# Patient Record
Sex: FEMALE | Race: BLACK OR AFRICAN AMERICAN | Employment: OTHER | URBAN - METROPOLITAN AREA
[De-identification: names, ages, dates, MRNs, and addresses within clinical notes are randomized per-mention and may not be internally consistent; named-entity substitution may affect disease eponyms.]

---

## 2018-05-23 PROBLEM — E66.01 OBESITY, MORBID (HCC): Status: ACTIVE | Noted: 2018-05-23

## 2018-05-29 ENCOUNTER — ANESTHESIA EVENT (OUTPATIENT)
Dept: SURGERY | Age: 68
End: 2018-05-29
Payer: MEDICARE

## 2018-05-29 RX ORDER — SODIUM CHLORIDE 0.9 % (FLUSH) 0.9 %
5-10 SYRINGE (ML) INJECTION EVERY 8 HOURS
Status: CANCELLED | OUTPATIENT
Start: 2018-05-29

## 2018-05-29 RX ORDER — OXYCODONE HYDROCHLORIDE 5 MG/1
5 TABLET ORAL ONCE
Status: CANCELLED | OUTPATIENT
Start: 2018-05-29 | End: 2018-05-30

## 2018-05-29 RX ORDER — SODIUM CHLORIDE 0.9 % (FLUSH) 0.9 %
5-10 SYRINGE (ML) INJECTION AS NEEDED
Status: CANCELLED | OUTPATIENT
Start: 2018-05-29

## 2018-05-30 ENCOUNTER — ANESTHESIA (OUTPATIENT)
Dept: SURGERY | Age: 68
End: 2018-05-30
Payer: MEDICARE

## 2018-05-30 ENCOUNTER — HOSPITAL ENCOUNTER (OUTPATIENT)
Age: 68
Setting detail: OUTPATIENT SURGERY
Discharge: HOME | End: 2018-05-30
Attending: SURGERY | Admitting: SURGERY
Payer: MEDICARE

## 2018-05-30 VITALS
HEART RATE: 60 BPM | DIASTOLIC BLOOD PRESSURE: 62 MMHG | TEMPERATURE: 97.8 F | SYSTOLIC BLOOD PRESSURE: 186 MMHG | RESPIRATION RATE: 12 BRPM | HEIGHT: 64 IN | WEIGHT: 240.38 LBS | OXYGEN SATURATION: 92 % | BODY MASS INDEX: 41.04 KG/M2

## 2018-05-30 DIAGNOSIS — I77.0 A-V FISTULA (HCC): Primary | ICD-10-CM

## 2018-05-30 LAB
GLUCOSE BLD STRIP.AUTO-MCNC: 67 MG/DL (ref 65–100)
GLUCOSE BLD STRIP.AUTO-MCNC: 84 MG/DL (ref 65–100)
POTASSIUM BLD-SCNC: 4.9 MMOL/L (ref 3.5–5.1)

## 2018-05-30 PROCEDURE — 84132 ASSAY OF SERUM POTASSIUM: CPT

## 2018-05-30 PROCEDURE — 76010000129 HC CV SURG 1.5 TO 2 HR: Performed by: SURGERY

## 2018-05-30 PROCEDURE — 74011250636 HC RX REV CODE- 250/636: Performed by: SURGERY

## 2018-05-30 PROCEDURE — 74011250636 HC RX REV CODE- 250/636

## 2018-05-30 PROCEDURE — 77030039266 HC ADH SKN EXOFIN S2SG -A: Performed by: SURGERY

## 2018-05-30 PROCEDURE — 93005 ELECTROCARDIOGRAM TRACING: CPT | Performed by: ANESTHESIOLOGY

## 2018-05-30 PROCEDURE — 77030016570 HC BLNKT BAIR HGGR 3M -B: Performed by: ANESTHESIOLOGY

## 2018-05-30 PROCEDURE — 74011250637 HC RX REV CODE- 250/637: Performed by: ANESTHESIOLOGY

## 2018-05-30 PROCEDURE — 74011000250 HC RX REV CODE- 250

## 2018-05-30 PROCEDURE — 74011250636 HC RX REV CODE- 250/636: Performed by: ANESTHESIOLOGY

## 2018-05-30 PROCEDURE — 77030002996 HC SUT SLK J&J -A: Performed by: SURGERY

## 2018-05-30 PROCEDURE — 76060000034 HC ANESTHESIA 1.5 TO 2 HR: Performed by: SURGERY

## 2018-05-30 PROCEDURE — 77030031139 HC SUT VCRL2 J&J -A: Performed by: SURGERY

## 2018-05-30 PROCEDURE — 77030002933 HC SUT MCRYL J&J -A: Performed by: SURGERY

## 2018-05-30 PROCEDURE — 77030034888 HC SUT PROL 2 J&J -B: Performed by: SURGERY

## 2018-05-30 PROCEDURE — 77030010512 HC APPL CLP LIG J&J -C: Performed by: SURGERY

## 2018-05-30 PROCEDURE — 74011000250 HC RX REV CODE- 250: Performed by: SURGERY

## 2018-05-30 PROCEDURE — 76210000016 HC OR PH I REC 1 TO 1.5 HR: Performed by: SURGERY

## 2018-05-30 PROCEDURE — 77030002986 HC SUT PROL J&J -A: Performed by: SURGERY

## 2018-05-30 PROCEDURE — 82962 GLUCOSE BLOOD TEST: CPT

## 2018-05-30 PROCEDURE — 77030032490 HC SLV COMPR SCD KNE COVD -B: Performed by: SURGERY

## 2018-05-30 PROCEDURE — 77030014008 HC SPNG HEMSTAT J&J -C: Performed by: SURGERY

## 2018-05-30 PROCEDURE — 77030020143 HC AIRWY LARYN INTUB CGAS -A: Performed by: ANESTHESIOLOGY

## 2018-05-30 PROCEDURE — 77030020782 HC GWN BAIR PAWS FLX 3M -B: Performed by: ANESTHESIOLOGY

## 2018-05-30 PROCEDURE — 77030011640 HC PAD GRND REM COVD -A: Performed by: SURGERY

## 2018-05-30 PROCEDURE — 76210000021 HC REC RM PH II 0.5 TO 1 HR: Performed by: SURGERY

## 2018-05-30 RX ORDER — HYDROCODONE BITARTRATE AND ACETAMINOPHEN 5; 325 MG/1; MG/1
1 TABLET ORAL
Qty: 12 TAB | Refills: 0 | Status: SHIPPED | OUTPATIENT
Start: 2018-05-30 | End: 2018-06-19 | Stop reason: ALTCHOICE

## 2018-05-30 RX ORDER — ONDANSETRON 2 MG/ML
4 INJECTION INTRAMUSCULAR; INTRAVENOUS
Status: COMPLETED | OUTPATIENT
Start: 2018-05-30 | End: 2018-05-30

## 2018-05-30 RX ORDER — ONDANSETRON 2 MG/ML
INJECTION INTRAMUSCULAR; INTRAVENOUS AS NEEDED
Status: DISCONTINUED | OUTPATIENT
Start: 2018-05-30 | End: 2018-05-30 | Stop reason: HOSPADM

## 2018-05-30 RX ORDER — HEPARIN SODIUM 1000 [USP'U]/ML
INJECTION, SOLUTION INTRAVENOUS; SUBCUTANEOUS AS NEEDED
Status: DISCONTINUED | OUTPATIENT
Start: 2018-05-30 | End: 2018-05-30 | Stop reason: HOSPADM

## 2018-05-30 RX ORDER — SODIUM CHLORIDE, SODIUM LACTATE, POTASSIUM CHLORIDE, CALCIUM CHLORIDE 600; 310; 30; 20 MG/100ML; MG/100ML; MG/100ML; MG/100ML
150 INJECTION, SOLUTION INTRAVENOUS CONTINUOUS
Status: DISCONTINUED | OUTPATIENT
Start: 2018-05-30 | End: 2018-05-30 | Stop reason: HOSPADM

## 2018-05-30 RX ORDER — BUPIVACAINE HYDROCHLORIDE 2.5 MG/ML
INJECTION, SOLUTION EPIDURAL; INFILTRATION; INTRACAUDAL AS NEEDED
Status: DISCONTINUED | OUTPATIENT
Start: 2018-05-30 | End: 2018-05-30 | Stop reason: HOSPADM

## 2018-05-30 RX ORDER — PROTAMINE SULFATE 10 MG/ML
INJECTION, SOLUTION INTRAVENOUS AS NEEDED
Status: DISCONTINUED | OUTPATIENT
Start: 2018-05-30 | End: 2018-05-30 | Stop reason: HOSPADM

## 2018-05-30 RX ORDER — FENTANYL CITRATE 50 UG/ML
100 INJECTION, SOLUTION INTRAMUSCULAR; INTRAVENOUS ONCE
Status: DISCONTINUED | OUTPATIENT
Start: 2018-05-30 | End: 2018-05-30 | Stop reason: HOSPADM

## 2018-05-30 RX ORDER — FENTANYL CITRATE 50 UG/ML
INJECTION, SOLUTION INTRAMUSCULAR; INTRAVENOUS AS NEEDED
Status: DISCONTINUED | OUTPATIENT
Start: 2018-05-30 | End: 2018-05-30 | Stop reason: HOSPADM

## 2018-05-30 RX ORDER — MIDAZOLAM HYDROCHLORIDE 1 MG/ML
2 INJECTION, SOLUTION INTRAMUSCULAR; INTRAVENOUS
Status: DISCONTINUED | OUTPATIENT
Start: 2018-05-30 | End: 2018-05-30 | Stop reason: HOSPADM

## 2018-05-30 RX ORDER — HYDROMORPHONE HYDROCHLORIDE 2 MG/ML
0.5 INJECTION, SOLUTION INTRAMUSCULAR; INTRAVENOUS; SUBCUTANEOUS
Status: DISCONTINUED | OUTPATIENT
Start: 2018-05-30 | End: 2018-05-30 | Stop reason: HOSPADM

## 2018-05-30 RX ORDER — SODIUM CHLORIDE 9 MG/ML
INJECTION, SOLUTION INTRAVENOUS
Status: DISCONTINUED | OUTPATIENT
Start: 2018-05-30 | End: 2018-05-30 | Stop reason: HOSPADM

## 2018-05-30 RX ORDER — HYDROCODONE BITARTRATE AND ACETAMINOPHEN 5; 325 MG/1; MG/1
1 TABLET ORAL AS NEEDED
Status: DISCONTINUED | OUTPATIENT
Start: 2018-05-30 | End: 2018-05-30 | Stop reason: HOSPADM

## 2018-05-30 RX ORDER — PROPOFOL 10 MG/ML
INJECTION, EMULSION INTRAVENOUS AS NEEDED
Status: DISCONTINUED | OUTPATIENT
Start: 2018-05-30 | End: 2018-05-30 | Stop reason: HOSPADM

## 2018-05-30 RX ORDER — EPHEDRINE SULFATE 50 MG/ML
INJECTION, SOLUTION INTRAVENOUS AS NEEDED
Status: DISCONTINUED | OUTPATIENT
Start: 2018-05-30 | End: 2018-05-30 | Stop reason: HOSPADM

## 2018-05-30 RX ORDER — HEPARIN SODIUM 5000 [USP'U]/ML
INJECTION, SOLUTION INTRAVENOUS; SUBCUTANEOUS AS NEEDED
Status: DISCONTINUED | OUTPATIENT
Start: 2018-05-30 | End: 2018-05-30 | Stop reason: HOSPADM

## 2018-05-30 RX ORDER — FAMOTIDINE 20 MG/1
20 TABLET, FILM COATED ORAL ONCE
Status: COMPLETED | OUTPATIENT
Start: 2018-05-30 | End: 2018-05-30

## 2018-05-30 RX ORDER — SODIUM CHLORIDE 9 MG/ML
50 INJECTION, SOLUTION INTRAVENOUS CONTINUOUS
Status: DISCONTINUED | OUTPATIENT
Start: 2018-05-30 | End: 2018-05-30 | Stop reason: HOSPADM

## 2018-05-30 RX ORDER — CEFAZOLIN SODIUM/WATER 2 G/20 ML
2 SYRINGE (ML) INTRAVENOUS
Status: COMPLETED | OUTPATIENT
Start: 2018-05-30 | End: 2018-05-30

## 2018-05-30 RX ORDER — LIDOCAINE HYDROCHLORIDE 20 MG/ML
INJECTION, SOLUTION EPIDURAL; INFILTRATION; INTRACAUDAL; PERINEURAL AS NEEDED
Status: DISCONTINUED | OUTPATIENT
Start: 2018-05-30 | End: 2018-05-30 | Stop reason: HOSPADM

## 2018-05-30 RX ORDER — LIDOCAINE HYDROCHLORIDE 10 MG/ML
INJECTION INFILTRATION; PERINEURAL AS NEEDED
Status: DISCONTINUED | OUTPATIENT
Start: 2018-05-30 | End: 2018-05-30 | Stop reason: HOSPADM

## 2018-05-30 RX ORDER — SODIUM CHLORIDE 0.9 % (FLUSH) 0.9 %
5-10 SYRINGE (ML) INJECTION AS NEEDED
Status: DISCONTINUED | OUTPATIENT
Start: 2018-05-30 | End: 2018-05-30 | Stop reason: HOSPADM

## 2018-05-30 RX ORDER — ACETAMINOPHEN 500 MG
1000 TABLET ORAL
Status: DISCONTINUED | OUTPATIENT
Start: 2018-05-30 | End: 2018-05-30 | Stop reason: HOSPADM

## 2018-05-30 RX ORDER — LIDOCAINE HYDROCHLORIDE 10 MG/ML
0.1 INJECTION INFILTRATION; PERINEURAL AS NEEDED
Status: DISCONTINUED | OUTPATIENT
Start: 2018-05-30 | End: 2018-05-30 | Stop reason: HOSPADM

## 2018-05-30 RX ORDER — GLYCOPYRROLATE 0.2 MG/ML
INJECTION INTRAMUSCULAR; INTRAVENOUS AS NEEDED
Status: DISCONTINUED | OUTPATIENT
Start: 2018-05-30 | End: 2018-05-30 | Stop reason: HOSPADM

## 2018-05-30 RX ADMIN — PROTAMINE SULFATE 10 MG: 10 INJECTION, SOLUTION INTRAVENOUS at 11:49

## 2018-05-30 RX ADMIN — FENTANYL CITRATE 25 MCG: 50 INJECTION, SOLUTION INTRAMUSCULAR; INTRAVENOUS at 11:41

## 2018-05-30 RX ADMIN — ONDANSETRON 4 MG: 2 INJECTION INTRAMUSCULAR; INTRAVENOUS at 11:54

## 2018-05-30 RX ADMIN — Medication 2 G: at 10:55

## 2018-05-30 RX ADMIN — FENTANYL CITRATE 25 MCG: 50 INJECTION, SOLUTION INTRAMUSCULAR; INTRAVENOUS at 10:57

## 2018-05-30 RX ADMIN — PROTAMINE SULFATE 5 MG: 10 INJECTION, SOLUTION INTRAVENOUS at 11:47

## 2018-05-30 RX ADMIN — HEPARIN SODIUM 5000 UNITS: 1000 INJECTION, SOLUTION INTRAVENOUS; SUBCUTANEOUS at 11:24

## 2018-05-30 RX ADMIN — FAMOTIDINE 20 MG: 20 TABLET, FILM COATED ORAL at 10:00

## 2018-05-30 RX ADMIN — PROTAMINE SULFATE 10 MG: 10 INJECTION, SOLUTION INTRAVENOUS at 11:48

## 2018-05-30 RX ADMIN — FENTANYL CITRATE 50 MCG: 50 INJECTION, SOLUTION INTRAMUSCULAR; INTRAVENOUS at 11:25

## 2018-05-30 RX ADMIN — SODIUM CHLORIDE: 9 INJECTION, SOLUTION INTRAVENOUS at 10:39

## 2018-05-30 RX ADMIN — FENTANYL CITRATE 25 MCG: 50 INJECTION, SOLUTION INTRAMUSCULAR; INTRAVENOUS at 10:50

## 2018-05-30 RX ADMIN — GLYCOPYRROLATE 0.2 MG: 0.2 INJECTION INTRAMUSCULAR; INTRAVENOUS at 11:01

## 2018-05-30 RX ADMIN — ONDANSETRON 4 MG: 2 INJECTION INTRAMUSCULAR; INTRAVENOUS at 13:45

## 2018-05-30 RX ADMIN — FENTANYL CITRATE 25 MCG: 50 INJECTION, SOLUTION INTRAMUSCULAR; INTRAVENOUS at 11:00

## 2018-05-30 RX ADMIN — FENTANYL CITRATE 25 MCG: 50 INJECTION, SOLUTION INTRAMUSCULAR; INTRAVENOUS at 11:07

## 2018-05-30 RX ADMIN — EPHEDRINE SULFATE 10 MG: 50 INJECTION, SOLUTION INTRAVENOUS at 11:30

## 2018-05-30 RX ADMIN — LIDOCAINE HYDROCHLORIDE 80 MG: 20 INJECTION, SOLUTION EPIDURAL; INFILTRATION; INTRACAUDAL; PERINEURAL at 10:46

## 2018-05-30 RX ADMIN — PROPOFOL 200 MG: 10 INJECTION, EMULSION INTRAVENOUS at 10:46

## 2018-05-30 NOTE — BRIEF OP NOTE
BRIEF OPERATIVE NOTE    Date of Procedure: 5/30/2018   Preoperative Diagnosis: End stage renal disease ([facility] 75.) [N18.6]  Postoperative Diagnosis: End stage renal disease ([facility] [N18.6]    Procedure(s):  LEFT BRACHIOCEPHALIC ARTERIO VENOUS FISTULA CREATION  Surgeon(s) and Role:     * Moreno Arellano MD - Primary       Surgical Staff:  Circ-1: Geraldo Galvin RN  Circ-Relief: Lesli Mcdaniel  Scrub Tech-1: Jonatan Villarreal  Scrub Tech-2: Sonny Hernandes Blevins  Scrub Tech-3: Malka Graft Tung  Event Time In   Incision Start 1106   Incision Close 1202     Anesthesia: General   Estimated Blood Loss: 25 mL  Specimens: * No specimens in log *   Findings: Good thrill and Doppler signals at completion  Complications: none  Implants: * No implants in log *

## 2018-05-30 NOTE — IP AVS SNAPSHOT
[address] [facility] 
 
 
 [address] [facility] [address] 
[phone] Patient: Raman Duff MRN: [id number] NVT:[id number]/0/4509 About your hospitalization You were admitted on:  May 30, 2018 You last received care in the:  [facility] PACU You were discharged on:  May 30, 2018 Why you were hospitalized Your primary diagnosis was:  Not on File Follow-up Information Follow up With Details Comments Contact Info Dayana Appiah MD   [id number] [address] 
[phone] Your Scheduled Appointments Tuesday June 19, 2018  8:45 AM EDT Global Post Op with Blanca Thao MD  
[facility] VASCULAR SURGERY ([facility]) [address] [address] [phone] Discharge Orders None A check sanya indicates which time of day the medication should be taken. My Medications START taking these medications Instructions Each Dose to Equal  
 Morning Noon Evening Bedtime HYDROcodone-acetaminophen 5-325 mg per tablet Commonly known as:  [address] Your last dose was: Your next dose is: Take 1 Tab by mouth every six (6) hours as needed for Pain. Max Daily Amount: 4 Tabs. Indications: Pain 1 Tab CONTINUE taking these medications Instructions Each Dose to Equal  
 Morning Noon Evening Bedtime  
 amLODIPine 10 mg tablet Commonly known as:  Rachael Matar Your last dose was: Your next dose is: Take  by mouth daily. aspirin delayed-release 81 mg tablet Your last dose was: Your next dose is: Take  by mouth daily. atorvastatin 80 mg tablet Commonly known as:  LIPITOR Your last dose was: Your next dose is: Take 80 mg by mouth nightly.   
 80 mg  
    
   
 Ferrous Fumarate 325 mg (106 mg iron) Tab Your last dose was: Your next dose is: Take  by mouth two (2) times a day. glipiZIDE 10 mg tablet Commonly known as:  Sunil Dixon Your last dose was: Your next dose is: Take 10 mg by mouth two (2) times a day. 10 mg  
    
   
   
   
  
 hydrALAZINE 25 mg tablet Commonly known as:  APRESOLINE Your last dose was: Your next dose is: Take 25 mg by mouth three (3) times daily. 25 mg  
    
   
   
   
  
 lisinopril 40 mg tablet Commonly known as:  Velora Nicholas Your last dose was: Your next dose is: Take 40 mg by mouth every morning. 40 mg  
    
   
   
   
  
 metoprolol succinate 50 mg XL tablet Commonly known as:  TOPROL-XL Your last dose was: Your next dose is: Take  by mouth daily. MUCUS RELIEF DM  mg Tab tablet Generic drug:  guaiFENesin-dextromethorphan Your last dose was: Your next dose is: Take  by mouth. SODIUM BICARBONATE PO Your last dose was: Your next dose is: Take  by mouth. torsemide 20 mg tablet Commonly known as:  DEMADEX Your last dose was: Your next dose is: Take  by mouth daily. VITAMIN D3 1,000 unit Cap Generic drug:  cholecalciferol Your last dose was: Your next dose is: Take  by mouth daily. Where to Get Your Medications Information on where to get these meds will be given to you by the nurse or doctor. ! Ask your nurse or doctor about these medications HYDROcodone-acetaminophen 5-325 mg per tablet Opioid Education Prescription Opioids: What You Need to Know: Prescription opioids can be used to help relieve moderate-to-severe pain and are often prescribed following a surgery or injury, or for certain health conditions. These medications can be an important part of treatment but also come with serious risks. Opioids are strong pain medicines. Examples include hydrocodone, oxycodone, fentanyl, and morphine. Heroin is an example of an illegal opioid. It is important to work with your health care provider to make sure you are getting the safest, most effective care. WHAT ARE THE RISKS AND SIDE EFFECTS OF OPIOID USE? Prescription opioids carry serious risks of addiction and overdose, especially with prolonged use. An opioid overdose, often marked by slow breathing, can cause sudden death. The use of prescription opioids can have a number of side effects as well, even when taken as directed. · Tolerance-meaning you might need to take more of a medication for the same pain relief · Physical dependence-meaning you have symptoms of withdrawal when the medication is stopped. Withdrawal symptoms can include nausea, sweating, chills, diarrhea, stomach cramps, and muscle aches. Withdrawal can last up to several weeks, depending on which drug you took and how long you took it. · Increased sensitivity to pain · Constipation · Nausea, vomiting, and dry mouth · Sleepiness and dizziness · Confusion · Depression · Low levels of testosterone that can result in lower sex drive, energy, and strength · Itching and sweating RISKS ARE GREATER WITH:      
· History of drug misuse, substance use disorder, or overdose · Mental health conditions (such as depression or anxiety) · Sleep apnea · Older age (72 years or older) · Pregnancy Avoid alcohol while taking prescription opioids. Also, unless specifically advised by your health care provider, medications to avoid include: · Benzodiazepines (such as Xanax or Valium) · Muscle relaxants (such as Soma or Flexeril) · Hypnotics (such as Ambien or Lunesta) · Other prescription opioids KNOW YOUR OPTIONS Talk to your health care provider about ways to manage your pain that don't involve prescription opioids. Some of these options may actually work better and have fewer risks and side effects. Options may include: 
· Pain relievers such as acetaminophen, ibuprofen, and naproxen · Some medications that are also used for depression or seizures · Physical therapy and exercise · Counseling to help patients learn how to cope better with triggers of pain and stress. · Application of heat or cold compress · Massage therapy · Relaxation techniques Be Informed Make sure you know the name of your medication, how much and how often to take it, and its potential risks & side effects. IF YOU ARE PRESCRIBED OPIOIDS FOR PAIN: 
· Never take opioids in greater amounts or more often than prescribed. Remember the goal is not to be pain-free but to manage your pain at a tolerable level. · Follow up with your primary care provider to: · Work together to create a plan on how to manage your pain. · Talk about ways to help manage your pain that don't involve prescription opioids. · Talk about any and all concerns and side effects. · Help prevent misuse and abuse. · Never sell or share prescription opioids · Help prevent misuse and abuse. · Store prescription opioids in a secure place and out of reach of others (this may include visitors, children, friends, and family). · Safely dispose of unused/unwanted prescription opioids: Find your community drug take-back program or your pharmacy mail-back program, or flush them down the toilet, following guidance from the Food and Drug Administration (www.fda.gov/Drugs/ResourcesForYou). · Visit www.cdc.gov/drugoverdose to learn about the risks of opioid abuse and overdose.  
· If you believe you may be struggling with addiction, tell your health care provider and ask for guidance or call Naz Torres at 6-016-700-ELDG. Discharge Instructions Hemodialysis Access: What to Expect at [facility] Your Recovery Hemodialysis is a way to remove wastes from the blood when your kidneys can no longer do the job. It is not a cure, but it can help you live longer and feel better. It is a lifesaving treatment when you have kidney failure. Hemodialysis is often called dialysis. Your doctor created a place (called an access) in your arm for your blood to flow in and out of your body during your dialysis sessions. Your arm will probably be bruised and swollen. It may hurt. The cut (incision) may bleed. The pain and bleeding will get better over several days. You will probably need only over-the-counter pain medicine. You can reduce swelling by propping your arm on 1 or 2 pillows and keeping your elbow straight. You will have stitches. These may dissolve on their own, or your doctor will tell you when to come in to have them removed. You should also be able to return to work in a few days. You may feel some coolness or numbness in your hand. These feelings usually go away in a few weeks. Your doctor may suggest squeezing a soft object. This will strengthen your access and may make hemodialysis faster and easier. You should always be able to feel blood rushing through the fistula or graft. It feels like a slight vibration when you put your fingers on the skin over the fistula or graft. This feeling is called a thrill or pulse. This care sheet gives you a general idea about how long it will take for you to recover. But each person recovers at a different pace. Follow the steps below to get better as quickly as possible. How can you care for yourself at home? Activity ? · Rest when you feel tired. Getting enough sleep will help you recover. Do not lie on or sleep on the arm with the access. ? · Avoid activities such as washing windows or gardening that put stress on the arm with the access. ? · You may use your arm, but do not lift anything that weighs more than about 15 pounds. This may include a child, heavy grocery bags, a heavy briefcase or backpack, cat litter or dog food bags, or a vacuum . ? · You can shower, but keep the access dry for the first 2 days. Cover the area with a plastic bag to keep it dry. ? · Do not soak or scrub the incision until it has healed. ? · Wear an arm guard to protect the area if you play sports or work with your arms. ? · You may drive when your doctor says it is okay. This is usually in 1 to 2 days. ? · Most people are able to return to work about 1 or 2 days after surgery. Diet ? · Follow an eating plan that is good for your kidneys. A registered dietitian can help you make a meal plan that is right for you. You may need to limit protein, salt, fluids, and certain foods. Medicines ? · Your doctor will tell you if and when you can restart your medicines. He or she will also give you instructions about taking any new medicines. ? · If you take blood thinners, such as warfarin (Coumadin), clopidogrel (Plavix), or aspirin, be sure to talk to your doctor. He or she will tell you if and when to start taking those medicines again. Make sure that you understand exactly what your doctor wants you to do. ? · Take pain medicines exactly as directed. ¨ If the doctor gave you a prescription medicine for pain, take it as prescribed. ¨ If you are not taking a prescription pain medicine, ask your doctor if you can take acetaminophen (Tylenol). Do not take ibuprofen (Advil, Motrin) or naproxen (Aleve), or similar medicines, unless your doctor tells you to. They may make chronic kidney disease worse.  
¨ Do not take two or more pain medicines at the same time unless the doctor told you to. Many pain medicines have acetaminophen, which is Tylenol. Too much acetaminophen (Tylenol) can be harmful. ? · If you think your pain medicine is making you sick to your stomach: 
¨ Take your medicine after meals (unless your doctor has told you not to). ¨ Ask your doctor for a different pain medicine. ? · If your doctor prescribed antibiotics, take them as directed. Do not stop taking them just because you feel better. You need to take the full course of antibiotics. Incision care ? · Keep the area dry for 2 days. After 2 days, wash the area with soap and water every day, and always before dialysis. ? · Do not soak or scrub the incision until it has healed. ? · If you have a bandage, change it every day or as your doctor recommends. Your doctor will tell you when you can remove it. Exercise ? · Squeeze a soft ball or other object as your doctor tells you. This will help blood flow through the access and help prevent blood clots. ? Elevation ? · Prop up the sore arm on a pillow anytime you sit or lie down during the next 3 days. Try to keep it above the level of your heart. This will help reduce swelling. Other instructions ? · Every day, check your access for a pulse or thrill in the fistula or graft area. A thrill is a vibration. To feel a pulse or thrill, place the first two fingers of your hand over the access. ? · Do not bump your arm. ? · Do not wear tight clothing, jewelry, or anything else that may squeeze the access. ? · Use your other arm to have blood drawn or blood pressure taken. ? · Do not put cream or lotion on or near the access. ? · Make sure all doctors you deal with know you have a vascular access. Follow-up care is a key part of your treatment and safety. Be sure to make and go to all appointments, and call your doctor if you are having problems. It's also a good idea to know your test results and keep a list of the medicines you take. When should you call for help? Call 911 anytime you think you may need emergency care. For example, call if: 
? · You passed out (lost consciousness). ? · You have chest pain, are short of breath, or cough up blood. ?Call your doctor now or seek immediate medical care if: 
? · Your hand or arm is cold or dark-colored. ? · You have no pulse in your access. ? · You have nausea or you vomit. ? · You have pain that does not get better after you take pain medicine. ? · You have loose stitches, or your incision comes open. ? · You are bleeding from the incision. ? · You have signs of infection, such as: 
¨ Increased pain, swelling, warmth, or redness. ¨ Red streaks leading from the area. ¨ Pus draining from the area. ¨ A fever. ? · You have signs of a blood clot in your leg (called a deep vein thrombosis), such as: 
¨ Pain in your calf, back of the knee, thigh, or groin. ¨ Redness or swelling in your leg. ? Watch closely for changes in your health, and be sure to contact your doctor if you have any problems. After general anesthesia or intravenous sedation, for 24 hours or while taking prescription Narcotics: · Limit your activities · Do not drive and operate hazardous machinery · Do not make important personal or business decisions · Do  not drink alcoholic beverages · If you have not urinated within 8 hours after discharge, please contact your surgeon on call. *  Please give a list of your current medications to your Primary Care Provider. *  Please update this list whenever your medications are discontinued, doses are 
    changed, or new medications (including over-the-counter products) are added. *  Please carry medication information at all times in case of emergency situations. These are general instructions for a healthy lifestyle: No smoking/ No tobacco products/ Avoid exposure to second hand smoke Surgeon General's Warning:  Quitting smoking now greatly reduces serious risk to your health. Obesity, smoking, and sedentary lifestyle greatly increases your risk for illness A healthy diet, regular physical exercise & weight monitoring are important for maintaining a healthy lifestyle You may be retaining fluid if you have a history of heart failure or if you experience any of the following symptoms:  Weight gain of 3 pounds or more overnight or 5 pounds in a week, increased swelling in our hands or feet or shortness of breath while lying flat in bed. Please call your doctor as soon as you notice any of these symptoms; do not wait until your next office visit. Recognize signs and symptoms of STROKE: 
F-face looks uneven A-arms unable to move or move unevenly S-speech slurred or non-existent T-time-call 911 as soon as signs and symptoms begin-DO NOT go Back to bed or wait to see if you get better-TIME IS BRAIN. Where can you learn more? Go to http://kari-mac.info/. Enter P616 in the search box to learn more about \"Hemodialysis Access: What to Expect at Home. \" Current as of: May 12, 2017 Content Version: 11.4 © 2941-5393 ffk environment. Care instructions adapted under license by [employer] (which disclaims liability or warranty for this information). If you have questions about a medical condition or this instruction, always ask your healthcare professional. [place] [address] any warranty or liability for your use of this information. Introducing hospitals & HEALTH SERVICES! [address] introduces RealMatch patient portal. Now you can access parts of your medical record, email your doctor's office, and request medication refills online. 1. In your internet browser, go to [email or web address] 2. Click on the First Time User? Click Here link in the Sign In box. You will see the New Member Sign Up page. 3. Enter your RealMatch Access Code exactly as it appears below.  You will not need to use this code after youve completed the sign-up process. If you do not sign up before the expiration date, you must request a new code. · Bracketz Access Code: [email or web address] Expires: 8/21/2018 11:48 AM 
 
4. Enter the last four digits of your Social Security Number (xxxx) and Date of Birth (mm/dd/yyyy) as indicated and click Submit. You will be taken to the next sign-up page. 5. Create a Bracketz ID. This will be your Bracketz login ID and cannot be changed, so think of one that is secure and easy to remember. 6. Create a Bracketz password. You can change your password at any time. 7. Enter your Password Reset Question and Answer. This can be used at a later time if you forget your password. 8. Enter your e-mail address. You will receive e-mail notification when new information is available in [address] 9. Click Sign Up. You can now view and download portions of your medical record. 10. Click the Download Summary menu link to download a portable copy of your medical information. If you have questions, please visit the Frequently Asked Questions section of the Bracketz website. Remember, Bracketz is NOT to be used for urgent needs. For medical emergencies, dial 911. Now available from your iPhone and Android! Introducing Teo Royal As a [facility] patient, I wanted to make you aware of our electronic visit tool called Teo Royal. [facility] 24/7 allows you to connect within minutes with a medical provider 24 hours a day, seven days a week via a mobile device or tablet or logging into a secure website from your computer. You can access Teo Royal from anywhere in the [place].  
 
A virtual visit might be right for you when you have a simple condition and feel like you just dont want to get out of bed, or cant get away from work for an appointment, when your regular [facility] provider is not available (evenings, weekends or holidays), or when youre out of town and need minor care. Electronic visits cost only $49 and if the [employer] provider determines a prescription is needed to treat your condition, one can be electronically transmitted to a nearby pharmacy*. Please take a moment to enroll today if you have not already done so. The enrollment process is free and takes just a few minutes. To enroll, please download the [employer] james to your tablet or phone, or visit [email or web address] to enroll on your computer. And, as an [address] [place] [address] patient with a [employer] account, the results of your visits will be scanned into your electronic medical record and your primary care provider will be able to view the scanned results. We urge you to continue to see your regular [facility] provider for your ongoing medical care. And while your primary care provider may not be the one available when you seek a Vaionipitof[employer] virtual visit, the peace of mind you get from getting a real diagnosis real time can be priceless. For more information on Vaionipitofin, view our Frequently Asked Questions (FAQs) at [email or web address]. Sincerely, 
 
Matty Varner MD 
Chief Medical Officer [address] *:  certain medications cannot be prescribed via Grid20/20 Unresulted Labs-Please follow up with your PCP about these lab tests Order Current Status EKG, 12 LEAD, INITIAL Preliminary result Providers Seen During Your Hospitalization Provider Specialty Primary office phone Kimberly Brown MD Vascular Surgery [phone] Your Primary Care Physician (PCP) Primary Care Physician Office Phone Office Fax Claudette Bowling [phone] You are allergic to the following No active allergies Recent Documentation Height Weight BMI OB Status Smoking Status 1.626 m 109 kg 41.26 kg/m2 Hysterectomy Never Smoker Emergency Contacts Name Discharge Info Relation Home Work Mobile Jt Flores  Brother [24] [phone] Patient Belongings The following personal items are in your possession at time of discharge: 
  Dental Appliances: Lowers, Uppers  Visual Aid: Glasses      Home Medications: None   Jewelry: None  Clothing: Footwear, Undergarments, Pants, Shirt    Other Valuables: Eyeglasses Please provide this summary of care documentation to your next provider. Signatures-by signing, you are acknowledging that this After Visit Summary has been reviewed with you and you have received a copy. Patient Signature:  ____________________________________________________________ Date:  ____________________________________________________________  
  
[facility] Provider Signature:  ____________________________________________________________ Date:  ____________________________________________________________

## 2018-05-30 NOTE — PERIOP NOTES
Dr. Leandro Perales informed of patients complaint of nausea, new orders rec. Per Dr. Leandro Perales patient may go home.

## 2018-05-30 NOTE — ANESTHESIA PREPROCEDURE EVALUATION
Anesthetic History   No history of anesthetic complications            Review of Systems / Medical History  Patient summary reviewed and pertinent labs reviewed    Pulmonary  Within defined limits                 Neuro/Psych   Within defined limits           Cardiovascular    Hypertension: well controlled          Hyperlipidemia    Exercise tolerance: <4 METS     GI/Hepatic/Renal         Renal disease (has not been dialy.  yet): ESRD       Endo/Other    Diabetes: well controlled, type 2, using insulin    Morbid obesity     Other Findings            Physical Exam    Airway  Mallampati: II  TM Distance: < 4 cm  Neck ROM: normal range of motion   Mouth opening: Normal     Cardiovascular    Rhythm: regular  Rate: normal    Murmur: Grade 2, Aortic area     Dental    Dentition: Full lower dentures, Full upper dentures and Edentulous     Pulmonary  Breath sounds clear to auscultation               Abdominal         Other Findings            Anesthetic Plan    ASA: 3  Anesthesia type: general          Induction: Intravenous  Anesthetic plan and risks discussed with: Patient

## 2018-05-30 NOTE — DISCHARGE INSTRUCTIONS
Hemodialysis Access: What to Expect at [facility]  Hemodialysis is a way to remove wastes from the blood when your kidneys can no longer do the job. It is not a cure, but it can help you live longer and feel better. It is a lifesaving treatment when you have kidney failure. Hemodialysis is often called dialysis. Your doctor created a place (called an access) in your arm for your blood to flow in and out of your body during your dialysis sessions. Your arm will probably be bruised and swollen. It may hurt. The cut (incision) may bleed. The pain and bleeding will get better over several days. You will probably need only over-the-counter pain medicine. You can reduce swelling by propping your arm on 1 or 2 pillows and keeping your elbow straight. You will have stitches. These may dissolve on their own, or your doctor will tell you when to come in to have them removed. You should also be able to return to work in a few days. You may feel some coolness or numbness in your hand. These feelings usually go away in a few weeks. Your doctor may suggest squeezing a soft object. This will strengthen your access and may make hemodialysis faster and easier. You should always be able to feel blood rushing through the fistula or graft. It feels like a slight vibration when you put your fingers on the skin over the fistula or graft. This feeling is called a thrill or pulse. This care sheet gives you a general idea about how long it will take for you to recover. But each person recovers at a different pace. Follow the steps below to get better as quickly as possible. How can you care for yourself at home? Activity  ? · Rest when you feel tired. Getting enough sleep will help you recover. Do not lie on or sleep on the arm with the access. ? · Avoid activities such as washing windows or gardening that put stress on the arm with the access.    ? · You may use your arm, but do not lift anything that weighs more than about 15 pounds. This may include a child, heavy grocery bags, a heavy briefcase or backpack, cat litter or dog food bags, or a vacuum . ? · You can shower, but keep the access dry for the first 2 days. Cover the area with a plastic bag to keep it dry. ? · Do not soak or scrub the incision until it has healed. ? · Wear an arm guard to protect the area if you play sports or work with your arms. ? · You may drive when your doctor says it is okay. This is usually in 1 to 2 days. ? · Most people are able to return to work about 1 or 2 days after surgery. Diet  ? · Follow an eating plan that is good for your kidneys. A registered dietitian can help you make a meal plan that is right for you. You may need to limit protein, salt, fluids, and certain foods. Medicines  ? · Your doctor will tell you if and when you can restart your medicines. He or she will also give you instructions about taking any new medicines. ? · If you take blood thinners, such as warfarin (Coumadin), clopidogrel (Plavix), or aspirin, be sure to talk to your doctor. He or she will tell you if and when to start taking those medicines again. Make sure that you understand exactly what your doctor wants you to do. ? · Take pain medicines exactly as directed. ¨ If the doctor gave you a prescription medicine for pain, take it as prescribed. ¨ If you are not taking a prescription pain medicine, ask your doctor if you can take acetaminophen (Tylenol). Do not take ibuprofen (Advil, Motrin) or naproxen (Aleve), or similar medicines, unless your doctor tells you to. They may make chronic kidney disease worse. ¨ Do not take two or more pain medicines at the same time unless the doctor told you to. Many pain medicines have acetaminophen, which is Tylenol. Too much acetaminophen (Tylenol) can be harmful.    ? · If you think your pain medicine is making you sick to your stomach:  ¨ Take your medicine after meals (unless your doctor has told you not to). ¨ Ask your doctor for a different pain medicine. ? · If your doctor prescribed antibiotics, take them as directed. Do not stop taking them just because you feel better. You need to take the full course of antibiotics. Incision care  ? · Keep the area dry for 2 days. After 2 days, wash the area with soap and water every day, and always before dialysis. ? · Do not soak or scrub the incision until it has healed. ? · If you have a bandage, change it every day or as your doctor recommends. Your doctor will tell you when you can remove it. Exercise  ? · Squeeze a soft ball or other object as your doctor tells you. This will help blood flow through the access and help prevent blood clots. ? Elevation  ? · Prop up the sore arm on a pillow anytime you sit or lie down during the next 3 days. Try to keep it above the level of your heart. This will help reduce swelling. Other instructions  ? · Every day, check your access for a pulse or thrill in the fistula or graft area. A thrill is a vibration. To feel a pulse or thrill, place the first two fingers of your hand over the access. ? · Do not bump your arm. ? · Do not wear tight clothing, jewelry, or anything else that may squeeze the access. ? · Use your other arm to have blood drawn or blood pressure taken. ? · Do not put cream or lotion on or near the access. ? · Make sure all doctors you deal with know you have a vascular access. Follow-up care is a key part of your treatment and safety. Be sure to make and go to all appointments, and call your doctor if you are having problems. It's also a good idea to know your test results and keep a list of the medicines you take. When should you call for help? Call 911 anytime you think you may need emergency care. For example, call if:  ? · You passed out (lost consciousness). ? · You have chest pain, are short of breath, or cough up blood.    ?Call your doctor now or seek immediate medical care if:  ? · Your hand or arm is cold or dark-colored. ? · You have no pulse in your access. ? · You have nausea or you vomit. ? · You have pain that does not get better after you take pain medicine. ? · You have loose stitches, or your incision comes open. ? · You are bleeding from the incision. ? · You have signs of infection, such as:  ¨ Increased pain, swelling, warmth, or redness. ¨ Red streaks leading from the area. ¨ Pus draining from the area. ¨ A fever. ? · You have signs of a blood clot in your leg (called a deep vein thrombosis), such as:  ¨ Pain in your calf, back of the knee, thigh, or groin. ¨ Redness or swelling in your leg. ? Watch closely for changes in your health, and be sure to contact your doctor if you have any problems. After general anesthesia or intravenous sedation, for 24 hours or while taking prescription Narcotics:  · Limit your activities  · Do not drive and operate hazardous machinery  · Do not make important personal or business decisions  · Do  not drink alcoholic beverages  · If you have not urinated within 8 hours after discharge, please contact your surgeon on call. *  Please give a list of your current medications to your Primary Care Provider. *  Please update this list whenever your medications are discontinued, doses are      changed, or new medications (including over-the-counter products) are added. *  Please carry medication information at all times in case of emergency situations. These are general instructions for a healthy lifestyle:  No smoking/ No tobacco products/ Avoid exposure to second hand smoke  Surgeon General's Warning:  Quitting smoking now greatly reduces serious risk to your health.   Obesity, smoking, and sedentary lifestyle greatly increases your risk for illness  A healthy diet, regular physical exercise & weight monitoring are important for maintaining a healthy lifestyle    You may be retaining fluid if you have a history of heart failure or if you experience any of the following symptoms:  Weight gain of 3 pounds or more overnight or 5 pounds in a week, increased swelling in our hands or feet or shortness of breath while lying flat in bed. Please call your doctor as soon as you notice any of these symptoms; do not wait until your next office visit. Recognize signs and symptoms of STROKE:  F-face looks uneven  A-arms unable to move or move unevenly  S-speech slurred or non-existent  T-time-call 911 as soon as signs and symptoms begin-DO NOT go       Back to bed or wait to see if you get better-TIME IS BRAIN. Where can you learn more? Go to http://kari-mac.info/. Enter P616 in the search box to learn more about \"Hemodialysis Access: What to Expect at Home. \"  Current as of: May 12, 2017  Content Version: 11.4  © 0253-6703 [employer]. Care instructions adapted under license by [employer] (which disclaims liability or warranty for this information). If you have questions about a medical condition or this instruction, always ask your healthcare professional. [place] [address] any warranty or liability for your use of this information.

## 2018-05-30 NOTE — OP NOTES
[facility]  MR#: [id number]  : 1950  ACCOUNT #: [de-identified]   DATE OF SERVICE: 2018    PREOPERATIVE DIAGNOSIS:  End-stage renal disease. POSTOPERATIVE DIAGNOSIS:   End-stage renal disease. PROCEDURE PERFORMED:  Left brachiocephalic arteriovenous fistula creation. SURGEON:  Dr. Olena Byrd. ASSISTANT:  None. ANESTHESIA:  General with local supplement. ESTIMATED BLOOD LOSS:  25 mL. SPECIMENS REMOVED:  None. COMPLICATIONS:  None. IMPLANTS:  None. STATEMENT OF MEDICAL NECESSITY:  The patient is a 76-year-old woman with end-stage renal disease. She has not yet started hemodialysis, but requires permanent access. Vein mapping indicates a suitable left upper arm cephalic vein for a fistula. PROCEDURE:  The patient was taken to the operating room and a general anesthetic was administered. The left upper extremity was prepped and draped in the usual sterile fashion. The skin was anesthetized in the antecubital fossa with 1% Xylocaine and 0.25% Sensorcaine and a standard transverse incision was made at the antecubital fossa. The junction of the confluence of the antecubital branches of the basilic and cephalic veins was carefully dissected. The basilic vein was divided between ligatures of 2-0 silk and the underlying aponeurosis was then divided to expose the brachial artery. The cephalic vein had a diameter of approximately 3.5 mm and no phlebitic changes. The brachial artery had a soft arterial wall with a normal pulse and a diameter of approximately 3.5 mm. The artery was surrounded with vessel loops proximally and distally. The cephalic vein was dissected distally to a branching point and then the anterior surface was marked with a marking pen to ensure proper orientation during the fistula creation.       The branches were divided distally and the distal stumps oversewn with 2-0 silk suture ligatures. The branching point bifurcation of the cephalic vein was then opened with a microscissors to allow for a larger spatulated anastomosis. The cephalic vein was gently injected with heparinized saline, which flowed easily and without resistance, and allowed the vein to dilate to approximately 4 mm. A small plastic vascular bulldog was then replaced on the cephalic vein. Heparin 5000 units was given intravenously and then 2 minutes later, the brachial artery was occluded proximally and distally with Fontaine vascular clamps. A longitudinal arteriotomy was made on the brachial artery oriented slightly laterally to facilitate the arteriovenous anastomosis. The lumen of the artery was inspected and found to be normal.      The cephalic vein was then anastomosed to the brachial artery in an end-to-side fashion using a single continuous 6-0 Prolene suture. The anastomosis was flushed and backbled, and then flow was restored to the fistula and then to the hand. At completion, there was a good thrill over the fistula as well as a strong Doppler signal over the fistula, including the cephalic vein runoff into the upper arm. The radial and ulnar artery pulses at the wrist were easily obtainable by Doppler. Protamine was then given to reverse the remaining heparin. The incisions were thoroughly irrigated and re-anesthetized. Fibrillar was used to assist with topical hemostasis. The incision was then closed in layers with running 3-0 Vicryl in the fascial layer. Care was taken not to compress or entrap the fistula. The skin was then closed with a running 4-0 subcuticular Monocryl. Tissue adhesive was then placed on the incision. The patient tolerated the procedure well and went to recovery in stable condition.       MD Adalid Lentz / Amy Garcia  D: 05/30/2018 12:25     T: 05/30/2018 12:50  JOB #: [id number]

## 2018-05-30 NOTE — ANESTHESIA POSTPROCEDURE EVALUATION
Post-Anesthesia Evaluation and Assessment    Patient: Karlos John MRN: [id number]  SSN: [id number]    YOB: 1950  Age: 69 y.o. Sex: female       Cardiovascular Function/Vital Signs  Visit Vitals    /62 (BP 1 Location: Right arm, BP Patient Position: Head of bed elevated (Comment degrees))    Pulse 60    Temp 36.6 °C (97.8 °F)    Resp 12    Ht 5' 4\" (1.626 m)    Wt 109 kg (240 lb 6 oz)    SpO2 92%    BMI 41.26 kg/m2       Patient is status post general anesthesia for Procedure(s):  LEFT BRACHIOCEPHALIC ARTERIO VENOUS FISTULA CREATION. Nausea/Vomiting: None    Postoperative hydration reviewed and adequate. Pain:  Pain Scale 1: Numeric (0 - 10) (05/30/18 1314)  Pain Intensity 1: 0 (05/30/18 1314)   Managed    Neurological Status:   Neuro (WDL): Within Defined Limits (05/30/18 1314)  Neuro  Neurologic State: Eyes open to voice;Drowsy (05/30/18 1224)  Orientation Level: Oriented X4 (05/30/18 1224)  Cognition: Follows commands (05/30/18 1224)  Speech: Clear (05/30/18 1224)  LUE Motor Response: Purposeful (05/30/18 1224)  LLE Motor Response: Purposeful (05/30/18 1224)  RUE Motor Response: Purposeful (05/30/18 1224)  RLE Motor Response: Purposeful (05/30/18 1224)   At baseline    Mental Status and Level of Consciousness: Arousable    Pulmonary Status:   O2 Device: Room air (05/30/18 1314)   Adequate oxygenation and airway patent    Complications related to anesthesia: None    Post-anesthesia assessment completed.  No concerns    Signed By: Augusto Prasad MD     May 30, 2018

## 2018-05-31 LAB
ATRIAL RATE: 62 BPM
CALCULATED P AXIS, ECG09: 39 DEGREES
CALCULATED R AXIS, ECG10: 21 DEGREES
CALCULATED T AXIS, ECG11: 17 DEGREES
DIAGNOSIS, 93000: NORMAL
P-R INTERVAL, ECG05: 174 MS
Q-T INTERVAL, ECG07: 468 MS
QRS DURATION, ECG06: 84 MS
QTC CALCULATION (BEZET), ECG08: 475 MS
VENTRICULAR RATE, ECG03: 62 BPM

## 2018-07-25 ENCOUNTER — HOSPITAL ENCOUNTER (OUTPATIENT)
Dept: SURGERY | Age: 68
Discharge: HOME | End: 2018-07-25
Payer: MEDICARE

## 2018-07-25 VITALS
WEIGHT: 247.06 LBS | BODY MASS INDEX: 42.18 KG/M2 | HEART RATE: 70 BPM | OXYGEN SATURATION: 95 % | RESPIRATION RATE: 16 BRPM | DIASTOLIC BLOOD PRESSURE: 69 MMHG | SYSTOLIC BLOOD PRESSURE: 186 MMHG | HEIGHT: 64 IN | TEMPERATURE: 98 F

## 2018-07-25 LAB
GLUCOSE BLD STRIP.AUTO-MCNC: 59 MG/DL (ref 65–100)
HGB BLD-MCNC: 9.3 G/DL (ref 11.7–15.4)
POTASSIUM SERPL-SCNC: 3.1 MMOL/L (ref 3.5–5.1)

## 2018-07-25 PROCEDURE — 82962 GLUCOSE BLOOD TEST: CPT

## 2018-07-25 PROCEDURE — 84132 ASSAY OF SERUM POTASSIUM: CPT | Performed by: ANESTHESIOLOGY

## 2018-07-25 PROCEDURE — 85018 HEMOGLOBIN: CPT | Performed by: ANESTHESIOLOGY

## 2018-07-25 RX ORDER — CHOLECALCIFEROL TAB 125 MCG (5000 UNIT) 125 MCG
5000 TAB ORAL DAILY
COMMUNITY

## 2018-07-25 RX ORDER — TORSEMIDE 100 MG/1
100 TABLET ORAL DAILY
COMMUNITY
End: 2022-03-24

## 2018-07-25 NOTE — PERIOP NOTES
Recent Results (from the past 12 hour(s))   GLUCOSE, POC    Collection Time: 07/25/18 11:42 AM   Result Value Ref Range    Glucose (POC) 59 (L) 65 - 100 mg/dL   POTASSIUM    Collection Time: 07/25/18 11:44 AM   Result Value Ref Range    Potassium 3.1 (L) 3.5 - 5.1 mmol/L   HEMOGLOBIN    Collection Time: 07/25/18 11:44 AM   Result Value Ref Range    HGB 9.3 (L) 11.7 - 15.4 g/dL

## 2018-07-25 NOTE — PERIOP NOTES
Patient verified name, , and surgery as listed in [facility]. Patient provided medical/health information and PTA medications to the best of their ability. TYPE  CASE:lb  Orders per surgeon: Received and dated . Labs per surgeon:Potassium. Labs per anesthesia protocol: POC Glucose, HGB,.   EKG  :  2018 NSR on chart. Echo in Care everywhere 2017, Stress test in Care Everywhere 2018. Patient has murmur. Called and notified Dr. Sabrina Herrera who came to see patient, recommended Echo prior to surgery. Called Dr. Len Tinsley office and spoke with Maryjane Hamilton to inform of needing echo. Patient has an appointment with Dr. Mirna Acosta on 2018 for office visit and will notify Dr. Mirna Acosta of recommendation for echo for cardiac clearance. POC Glucose: 59  Gave patient a can of soda. Patient provided with and instructed on education handouts including Guide to Surgery, blood transfusions, pain management, and hand hygiene for the family and community, and [facility] [place]. Antibacterial saop and Hibiclens instructions given per hospital policy. Instructed patient to continue previous medications as prescribed prior to surgery unless otherwise directed and to take the following medications the day of surgery according to anesthesia guidelines : Amlodipine, ASA 81 mg, Metoprolol . Instructed patient to hold  the following medications: Vitamins. Original medication prescription bottles were visualized during patient appointment. Patient teach back successful and patient demonstrates knowledge of instruction.

## 2018-07-27 NOTE — PERIOP NOTES
Echocardiogram Complete (Contrast or Bubble Study Per Protocol)6/19/2017  [facility]  Component Name Value Ref Range   IVS (2D) 1.51 0.6 - 0.9 cm   LVIDD (2D) 3.70 3.9 - 5.3 cm   LVIDS (2D) 2.32 2.2 - 3.5 cm   LVOT diameter 1.80 cm   LVOT Vmax 131.00 cm/s   LVOT peak gradient 7.00 mmHg    LVPWd (2D) 1.28 0.6 - 0.9 cm   LV E' lateral velocity 6.29 cm/s   LV E' septal velocity 4.99 cm/s   LV Ejection Fraction MOD 2C 70.00 %   LV Ejection Fraction MOD 4C 59.00 %   BP EF 67.00 %   Cube EF 75.30 %   Teichholz EF 68.20 %   MV E/e' Lateral Ratio 15.00     MV E/e' Septal Ratio 18.90     LVOT area 2.5 cm2   LA volume 69.00 cm3   LA dimension (2D) 4.00 cm   LA Volume Index 33.20 16 - 34 ml/m2   Ao Vmax 203.00 cm/s   AV peak gradient 16.00 mmHg    VERONICA (continuity Vmax) 1.64 cm2   Ascending aorta 3.10 cm   MV deceleration time 169.00 160 - 200 ms   MV Peak A David 106.00 cm/s   MV Peak E David 94.30 cm/s   E/A ratio 0.9 0.5 - 1.5    Pulmonary artery acceleration time 97.00 ms   PV Peak Velocity 133.00 cm/s   PV peak gradient 7.00 mmHg    RV R/L Diam 3.50 cm   IVC 1.50 cm   IVC 1.50 cm   /74 mmHg    BSA 2.18 m2   Weight 3712 oz    Height (In) 64 in    Pulmonary Artery Mean Presure 28.3 mmHg    Weight 232 lb    RV Free Wall Peak S' 13.4 cm/s   Result Narrative   · The left atrium is mildly dilated. · There is moderate concentric left ventricular hypertrophy present. · The left ventricular systolic function is normal (55-65%). · Grade II (moderate) left ventricular diastolic dysfunction present,   consistent with pseudonormalization. · No significant valvular abnormalities.      Copied and pasted from care everywhere 7/27/18

## 2018-07-27 NOTE — PERIOP NOTES
Dr. Andrae Isaac reviewed echo 6/19/17, ekg 3/30/17, last cardiologist office visit note 7/26/18. Per note, states CAD-abnormal stress test.  She will likely need dialysis in the very near future. Will pursue heart cath after her AV great revision is performed next week. Will plan on heart cath a couple of weeks after that to allow her wound time to heal.  Per Dr. Andrae Isaac, make note of cardiologist statement. Records on chart.

## 2018-07-29 ENCOUNTER — ANESTHESIA EVENT (OUTPATIENT)
Dept: SURGERY | Age: 68
End: 2018-07-29
Payer: MEDICARE

## 2018-07-30 ENCOUNTER — HOSPITAL ENCOUNTER (OUTPATIENT)
Age: 68
Setting detail: OUTPATIENT SURGERY
Discharge: HOME | End: 2018-07-30
Attending: SURGERY | Admitting: SURGERY
Payer: MEDICARE

## 2018-07-30 ENCOUNTER — ANESTHESIA (OUTPATIENT)
Dept: SURGERY | Age: 68
End: 2018-07-30
Payer: MEDICARE

## 2018-07-30 VITALS
SYSTOLIC BLOOD PRESSURE: 170 MMHG | WEIGHT: 251.8 LBS | TEMPERATURE: 98.1 F | RESPIRATION RATE: 18 BRPM | HEIGHT: 64 IN | HEART RATE: 59 BPM | OXYGEN SATURATION: 96 % | DIASTOLIC BLOOD PRESSURE: 79 MMHG | BODY MASS INDEX: 42.99 KG/M2

## 2018-07-30 DIAGNOSIS — T82.590D MECHANICAL COMPLICATION OF ARTERIOVENOUS FISTULA SURGICALLY CREATED, SUBSEQUENT ENCOUNTER: Primary | ICD-10-CM

## 2018-07-30 LAB
GLUCOSE BLD STRIP.AUTO-MCNC: 183 MG/DL (ref 65–100)
POTASSIUM BLD-SCNC: 3.3 MMOL/L (ref 3.5–5.1)

## 2018-07-30 PROCEDURE — 77030014008 HC SPNG HEMSTAT J&J -C: Performed by: SURGERY

## 2018-07-30 PROCEDURE — 77030039266 HC ADH SKN EXOFIN S2SG -A: Performed by: SURGERY

## 2018-07-30 PROCEDURE — 74011250636 HC RX REV CODE- 250/636: Performed by: ANESTHESIOLOGY

## 2018-07-30 PROCEDURE — 77030032490 HC SLV COMPR SCD KNE COVD -B: Performed by: SURGERY

## 2018-07-30 PROCEDURE — 84132 ASSAY OF SERUM POTASSIUM: CPT

## 2018-07-30 PROCEDURE — 77030002933 HC SUT MCRYL J&J -A: Performed by: SURGERY

## 2018-07-30 PROCEDURE — 76060000034 HC ANESTHESIA 1.5 TO 2 HR: Performed by: SURGERY

## 2018-07-30 PROCEDURE — 77030010512 HC APPL CLP LIG J&J -C: Performed by: SURGERY

## 2018-07-30 PROCEDURE — 76010000162 HC OR TIME 1.5 TO 2 HR INTENSV-TIER 1: Performed by: SURGERY

## 2018-07-30 PROCEDURE — 77030002987 HC SUT PROL J&J -B: Performed by: SURGERY

## 2018-07-30 PROCEDURE — 74011250637 HC RX REV CODE- 250/637: Performed by: ANESTHESIOLOGY

## 2018-07-30 PROCEDURE — 74011250636 HC RX REV CODE- 250/636

## 2018-07-30 PROCEDURE — 77030020782 HC GWN BAIR PAWS FLX 3M -B: Performed by: ANESTHESIOLOGY

## 2018-07-30 PROCEDURE — 74011000250 HC RX REV CODE- 250: Performed by: SURGERY

## 2018-07-30 PROCEDURE — 74011250636 HC RX REV CODE- 250/636: Performed by: SURGERY

## 2018-07-30 PROCEDURE — 82962 GLUCOSE BLOOD TEST: CPT

## 2018-07-30 PROCEDURE — 77030008477 HC STYL SATN SLP COVD -A: Performed by: ANESTHESIOLOGY

## 2018-07-30 PROCEDURE — 76210000024 HC REC RM PH II 2.5 TO 3 HR: Performed by: SURGERY

## 2018-07-30 PROCEDURE — 77030011640 HC PAD GRND REM COVD -A: Performed by: SURGERY

## 2018-07-30 PROCEDURE — 77030002996 HC SUT SLK J&J -A: Performed by: SURGERY

## 2018-07-30 PROCEDURE — 77030031139 HC SUT VCRL2 J&J -A: Performed by: SURGERY

## 2018-07-30 PROCEDURE — 74011000250 HC RX REV CODE- 250

## 2018-07-30 PROCEDURE — 77030019908 HC STETH ESOPH SIMS -A: Performed by: ANESTHESIOLOGY

## 2018-07-30 PROCEDURE — 76210000006 HC OR PH I REC 0.5 TO 1 HR: Performed by: SURGERY

## 2018-07-30 PROCEDURE — 77030008703 HC TU ET UNCUF COVD -A: Performed by: ANESTHESIOLOGY

## 2018-07-30 RX ORDER — MIDAZOLAM HYDROCHLORIDE 1 MG/ML
2 INJECTION, SOLUTION INTRAMUSCULAR; INTRAVENOUS
Status: DISCONTINUED | OUTPATIENT
Start: 2018-07-30 | End: 2018-07-30 | Stop reason: HOSPADM

## 2018-07-30 RX ORDER — LIDOCAINE HYDROCHLORIDE 20 MG/ML
INJECTION, SOLUTION EPIDURAL; INFILTRATION; INTRACAUDAL; PERINEURAL AS NEEDED
Status: DISCONTINUED | OUTPATIENT
Start: 2018-07-30 | End: 2018-07-30 | Stop reason: HOSPADM

## 2018-07-30 RX ORDER — ALBUTEROL SULFATE 0.83 MG/ML
2.5 SOLUTION RESPIRATORY (INHALATION) AS NEEDED
Status: DISCONTINUED | OUTPATIENT
Start: 2018-07-30 | End: 2018-07-30 | Stop reason: HOSPADM

## 2018-07-30 RX ORDER — PROPOFOL 10 MG/ML
INJECTION, EMULSION INTRAVENOUS AS NEEDED
Status: DISCONTINUED | OUTPATIENT
Start: 2018-07-30 | End: 2018-07-30 | Stop reason: HOSPADM

## 2018-07-30 RX ORDER — OXYCODONE AND ACETAMINOPHEN 7.5; 325 MG/1; MG/1
1 TABLET ORAL
Qty: 15 TAB | Refills: 0 | Status: SHIPPED | OUTPATIENT
Start: 2018-07-30 | End: 2021-11-02

## 2018-07-30 RX ORDER — SODIUM CHLORIDE 9 MG/ML
50 INJECTION, SOLUTION INTRAVENOUS CONTINUOUS
Status: DISCONTINUED | OUTPATIENT
Start: 2018-07-30 | End: 2018-07-30 | Stop reason: HOSPADM

## 2018-07-30 RX ORDER — HEPARIN SODIUM 5000 [USP'U]/ML
INJECTION, SOLUTION INTRAVENOUS; SUBCUTANEOUS AS NEEDED
Status: DISCONTINUED | OUTPATIENT
Start: 2018-07-30 | End: 2018-07-30 | Stop reason: HOSPADM

## 2018-07-30 RX ORDER — ONDANSETRON 2 MG/ML
INJECTION INTRAMUSCULAR; INTRAVENOUS AS NEEDED
Status: DISCONTINUED | OUTPATIENT
Start: 2018-07-30 | End: 2018-07-30 | Stop reason: HOSPADM

## 2018-07-30 RX ORDER — SODIUM CHLORIDE 0.9 % (FLUSH) 0.9 %
5-10 SYRINGE (ML) INJECTION EVERY 8 HOURS
Status: DISCONTINUED | OUTPATIENT
Start: 2018-07-30 | End: 2018-07-30 | Stop reason: HOSPADM

## 2018-07-30 RX ORDER — LIDOCAINE HYDROCHLORIDE 10 MG/ML
0.1 INJECTION INFILTRATION; PERINEURAL AS NEEDED
Status: DISCONTINUED | OUTPATIENT
Start: 2018-07-30 | End: 2018-07-30 | Stop reason: HOSPADM

## 2018-07-30 RX ORDER — SUCCINYLCHOLINE CHLORIDE 20 MG/ML
INJECTION INTRAMUSCULAR; INTRAVENOUS AS NEEDED
Status: DISCONTINUED | OUTPATIENT
Start: 2018-07-30 | End: 2018-07-30 | Stop reason: HOSPADM

## 2018-07-30 RX ORDER — EPHEDRINE SULFATE 50 MG/ML
INJECTION, SOLUTION INTRAVENOUS AS NEEDED
Status: DISCONTINUED | OUTPATIENT
Start: 2018-07-30 | End: 2018-07-30 | Stop reason: HOSPADM

## 2018-07-30 RX ORDER — SODIUM CHLORIDE, SODIUM LACTATE, POTASSIUM CHLORIDE, CALCIUM CHLORIDE 600; 310; 30; 20 MG/100ML; MG/100ML; MG/100ML; MG/100ML
1000 INJECTION, SOLUTION INTRAVENOUS CONTINUOUS
Status: DISCONTINUED | OUTPATIENT
Start: 2018-07-30 | End: 2018-07-30 | Stop reason: HOSPADM

## 2018-07-30 RX ORDER — FENTANYL CITRATE 50 UG/ML
INJECTION, SOLUTION INTRAMUSCULAR; INTRAVENOUS AS NEEDED
Status: DISCONTINUED | OUTPATIENT
Start: 2018-07-30 | End: 2018-07-30 | Stop reason: HOSPADM

## 2018-07-30 RX ORDER — CEFAZOLIN SODIUM/WATER 2 G/20 ML
2 SYRINGE (ML) INTRAVENOUS
Status: COMPLETED | OUTPATIENT
Start: 2018-07-30 | End: 2018-07-30

## 2018-07-30 RX ORDER — ONDANSETRON 2 MG/ML
4 INJECTION INTRAMUSCULAR; INTRAVENOUS
Status: DISCONTINUED | OUTPATIENT
Start: 2018-07-30 | End: 2018-07-30 | Stop reason: HOSPADM

## 2018-07-30 RX ORDER — POTASSIUM CHLORIDE 20 MEQ/1
40 TABLET, EXTENDED RELEASE ORAL
Status: COMPLETED | OUTPATIENT
Start: 2018-07-30 | End: 2018-07-30

## 2018-07-30 RX ORDER — DEXAMETHASONE SODIUM PHOSPHATE 4 MG/ML
INJECTION, SOLUTION INTRA-ARTICULAR; INTRALESIONAL; INTRAMUSCULAR; INTRAVENOUS; SOFT TISSUE AS NEEDED
Status: DISCONTINUED | OUTPATIENT
Start: 2018-07-30 | End: 2018-07-30 | Stop reason: HOSPADM

## 2018-07-30 RX ORDER — BUPIVACAINE HYDROCHLORIDE 2.5 MG/ML
INJECTION, SOLUTION EPIDURAL; INFILTRATION; INTRACAUDAL AS NEEDED
Status: DISCONTINUED | OUTPATIENT
Start: 2018-07-30 | End: 2018-07-30 | Stop reason: HOSPADM

## 2018-07-30 RX ORDER — SODIUM CHLORIDE 0.9 % (FLUSH) 0.9 %
5-10 SYRINGE (ML) INJECTION AS NEEDED
Status: DISCONTINUED | OUTPATIENT
Start: 2018-07-30 | End: 2018-07-30 | Stop reason: HOSPADM

## 2018-07-30 RX ORDER — ACETAMINOPHEN 500 MG
1000 TABLET ORAL ONCE
Status: COMPLETED | OUTPATIENT
Start: 2018-07-30 | End: 2018-07-30

## 2018-07-30 RX ORDER — FUROSEMIDE 10 MG/ML
40 INJECTION INTRAMUSCULAR; INTRAVENOUS ONCE
Status: COMPLETED | OUTPATIENT
Start: 2018-07-30 | End: 2018-07-30

## 2018-07-30 RX ORDER — LANOLIN ALCOHOL/MO/W.PET/CERES
400 CREAM (GRAM) TOPICAL ONCE
Status: COMPLETED | OUTPATIENT
Start: 2018-07-30 | End: 2018-07-30

## 2018-07-30 RX ORDER — LIDOCAINE HYDROCHLORIDE 10 MG/ML
INJECTION INFILTRATION; PERINEURAL AS NEEDED
Status: DISCONTINUED | OUTPATIENT
Start: 2018-07-30 | End: 2018-07-30 | Stop reason: HOSPADM

## 2018-07-30 RX ORDER — HYDROMORPHONE HYDROCHLORIDE 2 MG/ML
0.5 INJECTION, SOLUTION INTRAMUSCULAR; INTRAVENOUS; SUBCUTANEOUS
Status: DISCONTINUED | OUTPATIENT
Start: 2018-07-30 | End: 2018-07-30 | Stop reason: HOSPADM

## 2018-07-30 RX ADMIN — DEXAMETHASONE SODIUM PHOSPHATE 8 MG: 4 INJECTION, SOLUTION INTRA-ARTICULAR; INTRALESIONAL; INTRAMUSCULAR; INTRAVENOUS; SOFT TISSUE at 09:15

## 2018-07-30 RX ADMIN — ONDANSETRON 4 MG: 2 INJECTION INTRAMUSCULAR; INTRAVENOUS at 09:15

## 2018-07-30 RX ADMIN — PROPOFOL 20 MG: 10 INJECTION, EMULSION INTRAVENOUS at 10:05

## 2018-07-30 RX ADMIN — PROPOFOL 20 MG: 10 INJECTION, EMULSION INTRAVENOUS at 10:01

## 2018-07-30 RX ADMIN — PROPOFOL 10 MG: 10 INJECTION, EMULSION INTRAVENOUS at 10:07

## 2018-07-30 RX ADMIN — FUROSEMIDE 40 MG: 10 INJECTION, SOLUTION INTRAMUSCULAR; INTRAVENOUS at 11:51

## 2018-07-30 RX ADMIN — FENTANYL CITRATE 100 MCG: 50 INJECTION, SOLUTION INTRAMUSCULAR; INTRAVENOUS at 08:55

## 2018-07-30 RX ADMIN — POTASSIUM CHLORIDE 40 MEQ: 20 TABLET, EXTENDED RELEASE ORAL at 11:57

## 2018-07-30 RX ADMIN — SODIUM CHLORIDE 50 ML/HR: 900 INJECTION, SOLUTION INTRAVENOUS at 07:29

## 2018-07-30 RX ADMIN — SUCCINYLCHOLINE CHLORIDE 100 MG: 20 INJECTION INTRAMUSCULAR; INTRAVENOUS at 08:55

## 2018-07-30 RX ADMIN — SUCCINYLCHOLINE CHLORIDE 100 MG: 20 INJECTION INTRAMUSCULAR; INTRAVENOUS at 09:09

## 2018-07-30 RX ADMIN — PROPOFOL 120 MG: 10 INJECTION, EMULSION INTRAVENOUS at 08:55

## 2018-07-30 RX ADMIN — LIDOCAINE HYDROCHLORIDE 100 MG: 20 INJECTION, SOLUTION EPIDURAL; INFILTRATION; INTRACAUDAL; PERINEURAL at 08:55

## 2018-07-30 RX ADMIN — EPHEDRINE SULFATE 5 MG: 50 INJECTION, SOLUTION INTRAVENOUS at 09:23

## 2018-07-30 RX ADMIN — PROPOFOL 10 MG: 10 INJECTION, EMULSION INTRAVENOUS at 10:09

## 2018-07-30 RX ADMIN — ACETAMINOPHEN 1000 MG: 500 TABLET, FILM COATED ORAL at 07:32

## 2018-07-30 RX ADMIN — Medication 2 G: at 09:14

## 2018-07-30 RX ADMIN — Medication 400 MG: at 11:57

## 2018-07-30 RX ADMIN — PROPOFOL 20 MG: 10 INJECTION, EMULSION INTRAVENOUS at 09:57

## 2018-07-30 NOTE — PERIOP NOTES
Dr. Jimmy Acosta on unit - advised of patient's elevated blood pressure, POC glucose reading. No new orders.

## 2018-07-30 NOTE — IP AVS SNAPSHOT
[facility] 
 
 
 [address] [facility] [address] 
[phone] Patient: Kaur Ortega MRN: [id number] About your hospitalization You were admitted on:  July 30, 2018 You last received care in the:  [facility] PACU You were discharged on:  July 30, 2018 Why you were hospitalized Your primary diagnosis was:  Not on File Follow-up Information Follow up With Details Comments Contact Info Guillaume Ozuna MD   [id number]  [address] 
[phone] Franko Bruno MD Follow up on 8/21/2018 @11:00 AM  [address] [facility] [address] 
[phone] Your Scheduled Appointments Tuesday August 21, 2018 11:00 AM EDT Global Post Op with Franko Bruno MD  
[facility] VASCULAR SURGERY ([facility]) [address] [address] [phone] Discharge Orders None A check sanya indicates which time of day the medication should be taken. My Medications START taking these medications Instructions Each Dose to Equal  
 Morning Noon Evening Bedtime  
 oxyCODONE-acetaminophen 7.5-325 mg per tablet Commonly known as:  PERCOCET Your last dose was: Your next dose is: Take 1 Tab by mouth every six (6) hours as needed for Pain. Max Daily Amount: 4 Tabs. Indications: Pain 1 Tab CONTINUE taking these medications Instructions Each Dose to Equal  
 Morning Noon Evening Bedtime  
 amLODIPine 10 mg tablet Commonly known as:  Bhumika Spruce Your last dose was: Your next dose is: Take  by mouth daily. aspirin delayed-release 81 mg tablet Your last dose was: Your next dose is: Take  by mouth daily. atorvastatin 80 mg tablet Commonly known as:  LIPITOR Your last dose was: Your next dose is: Take 80 mg by mouth nightly. 80 mg  
    
   
   
   
  
 cholecalciferol (VITAMIN D3) 5,000 unit Tab tablet Commonly known as:  VITAMIN D3 Your last dose was: Your next dose is: Take 5,000 Units by mouth daily. 5000 Units Ferrous Fumarate 325 mg (106 mg iron) Tab Your last dose was: Your next dose is: Take  by mouth two (2) times a day. glipiZIDE 10 mg tablet Commonly known as:  Loura Yonas Your last dose was: Your next dose is: Take 10 mg by mouth two (2) times a day. 10 mg  
    
   
   
   
  
 hydrALAZINE 25 mg tablet Commonly known as:  APRESOLINE Your last dose was: Your next dose is: Take 25 mg by mouth three (3) times daily. 25 mg  
    
   
   
   
  
 lisinopril 40 mg tablet Commonly known as:  Marguerite Hagan Your last dose was: Your next dose is: Take 40 mg by mouth every morning. 40 mg  
    
   
   
   
  
 metoprolol succinate 50 mg XL tablet Commonly known as:  TOPROL-XL Your last dose was: Your next dose is: Take  by mouth daily. MUCUS RELIEF DM  mg Tab tablet Generic drug:  guaiFENesin-dextromethorphan Your last dose was: Your next dose is: Take 1 Tab by mouth as needed. 1 Tab SODIUM BICARBONATE PO Your last dose was: Your next dose is: Take  by mouth two (2) times a day. 10 GR tab  
     
   
   
   
  
 torsemide 100 mg tablet Commonly known as:  DEMADEX Your last dose was: Your next dose is: Take 100 mg by mouth daily. 100 mg Where to Get Your Medications Information on where to get these meds will be given to you by the nurse or doctor. ! Ask your nurse or doctor about these medications  
  oxyCODONE-acetaminophen 7.5-325 mg per tablet Opioid Education Prescription Opioids: What You Need to Know: 
 
Prescription opioids can be used to help relieve moderate-to-severe pain and are often prescribed following a surgery or injury, or for certain health conditions. These medications can be an important part of treatment but also come with serious risks. Opioids are strong pain medicines. Examples include hydrocodone, oxycodone, fentanyl, and morphine. Heroin is an example of an illegal opioid. It is important to work with your health care provider to make sure you are getting the safest, most effective care. WHAT ARE THE RISKS AND SIDE EFFECTS OF OPIOID USE? Prescription opioids carry serious risks of addiction and overdose, especially with prolonged use. An opioid overdose, often marked by slow breathing, can cause sudden death. The use of prescription opioids can have a number of side effects as well, even when taken as directed. · Tolerance-meaning you might need to take more of a medication for the same pain relief · Physical dependence-meaning you have symptoms of withdrawal when the medication is stopped. Withdrawal symptoms can include nausea, sweating, chills, diarrhea, stomach cramps, and muscle aches. Withdrawal can last up to several weeks, depending on which drug you took and how long you took it. · Increased sensitivity to pain · Constipation · Nausea, vomiting, and dry mouth · Sleepiness and dizziness · Confusion · Depression · Low levels of testosterone that can result in lower sex drive, energy, and strength · Itching and sweating RISKS ARE GREATER WITH:      
· History of drug misuse, substance use disorder, or overdose · Mental health conditions (such as depression or anxiety) · Sleep apnea · Older age (72 years or older) · Pregnancy Avoid alcohol while taking prescription opioids. Also, unless specifically advised by your health care provider, medications to avoid include: · Benzodiazepines (such as Xanax or Valium) · Muscle relaxants (such as Soma or Flexeril) · Hypnotics (such as Ambien or Lunesta) · Other prescription opioids KNOW YOUR OPTIONS Talk to your health care provider about ways to manage your pain that don't involve prescription opioids. Some of these options may actually work better and have fewer risks and side effects. Options may include: 
· Pain relievers such as acetaminophen, ibuprofen, and naproxen · Some medications that are also used for depression or seizures · Physical therapy and exercise · Counseling to help patients learn how to cope better with triggers of pain and stress. · Application of heat or cold compress · Massage therapy · Relaxation techniques Be Informed Make sure you know the name of your medication, how much and how often to take it, and its potential risks & side effects. IF YOU ARE PRESCRIBED OPIOIDS FOR PAIN: 
· Never take opioids in greater amounts or more often than prescribed. Remember the goal is not to be pain-free but to manage your pain at a tolerable level. · Follow up with your primary care provider to: · Work together to create a plan on how to manage your pain. · Talk about ways to help manage your pain that don't involve prescription opioids. · Talk about any and all concerns and side effects. · Help prevent misuse and abuse. · Never sell or share prescription opioids · Help prevent misuse and abuse. · Store prescription opioids in a secure place and out of reach of others (this may include visitors, children, friends, and family).  
· Safely dispose of unused/unwanted prescription opioids: Find your community drug take-back program or your pharmacy mail-back program, or flush them down the toilet, following guidance from the Food and Drug Administration (www.fda.gov/Drugs/ResourcesForYou). · Visit www.cdc.gov/drugoverdose to learn about the risks of opioid abuse and overdose. · If you believe you may be struggling with addiction, tell your health care provider and ask for guidance or call Naz Torres at [phone]. Discharge Instructions Hemodialysis Access: What to Expect at [facility] Your Recovery Hemodialysis is a way to remove wastes from the blood when your kidneys can no longer do the job. It is not a cure, but it can help you live longer and feel better. It is a lifesaving treatment when you have kidney failure. Hemodialysis is often called dialysis. Your doctor created a place (called an access) in your arm for your blood to flow in and out of your body during your dialysis sessions. Your arm will probably be bruised and swollen. It may hurt. The cut (incision) may bleed. The pain and bleeding will get better over several days. You will probably need only over-the-counter pain medicine. You can reduce swelling by propping your arm on 1 or 2 pillows and keeping your elbow straight. You will have stitches. These may dissolve on their own, or your doctor will tell you when to come in to have them removed. You should also be able to return to work in a few days. You may feel some coolness or numbness in your hand. These feelings usually go away in a few weeks. Your doctor may suggest squeezing a soft object. This will strengthen your access and may make hemodialysis faster and easier. You should always be able to feel blood rushing through the fistula or graft. It feels like a slight vibration when you put your fingers on the skin over the fistula or graft. This feeling is called a thrill or pulse.  
This care sheet gives you a general idea about how long it will take for you to recover. But each person recovers at a different pace. Follow the steps below to get better as quickly as possible. How can you care for yourself at home? Activity 
  · Rest when you feel tired. Getting enough sleep will help you recover. Do not lie on or sleep on the arm with the access.  
  · Avoid activities such as washing windows or gardening that put stress on the arm with the access.  
  · You may use your arm, but do not lift anything that weighs more than about 5 pounds with the left arm. This may include a child, heavy grocery bags, a heavy briefcase or backpack, cat litter or dog food bags, or a vacuum .  
  · You can shower, but keep the access dry for the first 2 days. Cover the area with a plastic bag to keep it dry.  
  · Do not soak or scrub the incision until it has healed.  
  · Wear an arm guard to protect the area if you play sports or work with your arms.  
  · You may drive when your doctor says it is okay. This is usually in 1 to 2 days.  
  · Most people are able to return to work about 1 or 2 days after surgery. Diet 
  · Follow an eating plan that is good for your kidneys. A registered dietitian can help you make a meal plan that is right for you. You may need to limit protein, salt, fluids, and certain foods. Medicines 
  · Your doctor will tell you if and when you can restart your medicines. He or she will also give you instructions about taking any new medicines.  
  · If you take blood thinners, such as warfarin (Coumadin), clopidogrel (Plavix), or aspirin, be sure to talk to your doctor. He or she will tell you if and when to start taking those medicines again. Make sure that you understand exactly what your doctor wants you to do.  
  · Take pain medicines exactly as directed. ¨ If the doctor gave you a prescription medicine for pain, take it as prescribed.  
¨ If you are not taking a prescription pain medicine, ask your doctor if you can take acetaminophen (Tylenol). Do not take ibuprofen (Advil, Motrin) or naproxen (Aleve), or similar medicines, unless your doctor tells you to. They may make chronic kidney disease worse. ¨ Do not take two or more pain medicines at the same time unless the doctor told you to. Many pain medicines have acetaminophen, which is Tylenol. Too much acetaminophen (Tylenol) can be harmful.  
  · If you think your pain medicine is making you sick to your stomach: 
¨ Take your medicine after meals (unless your doctor has told you not to). ¨ Ask your doctor for a different pain medicine.  
  · If your doctor prescribed antibiotics, take them as directed. Do not stop taking them just because you feel better. You need to take the full course of antibiotics. Incision care 
  · Keep the area dry until the outer gauze dressing is removed in 3-4 days. · You may remove the gauze dressing at home in 3-4 days. If you want help with it, call our office at [phone] and one of our staff will remove it for you. · After the dressing is removed, gently wash the area with soap and water every day, and always before dialysis.  
  · Do not soak or scrub the incision until it has healed.  
    
Exercise 
  · Squeeze a soft ball or other object as your doctor tells you. This will help blood flow through the access and help prevent blood clots.  
 Elevation 
  · Prop up the sore arm on a pillow anytime you sit or lie down during the next 3 days. Try to keep it above the level of your heart. This will help reduce swelling. Other instructions 
  · Every day, check your access for a pulse or thrill in the fistula or graft area. A thrill is a vibration. To feel a pulse or thrill, place the first two fingers of your hand over the access.  
  · Do not bump your arm.  
  · Do not wear tight clothing, jewelry, or anything else that may squeeze the access.  
  · Use your other arm to have blood drawn or blood pressure taken.   · Do not put cream or lotion on or near the access.  
  · Make sure all doctors you deal with know you have a vascular access. Follow-up care is a key part of your treatment and safety. Be sure to make and go to all appointments, and call your doctor if you are having problems. It's also a good idea to know your test results and keep a list of the medicines you take. When should you call for help? Call 911 anytime you think you may need emergency care. For example, call if: 
  · You passed out (lost consciousness).  
  · You have chest pain, are short of breath, or cough up blood.  
 Call your doctor now or seek immediate medical care if: 
  · Your hand or arm is cold or dark-colored.  
  · You have no pulse in your access.  
  · You have nausea or you vomit.  
  · You have pain that does not get better after you take pain medicine.  
  · You have loose stitches, or your incision comes open.  
  · You are bleeding from the incision.  
  · You have signs of infection, such as: 
¨ Increased pain, swelling, warmth, or redness. ¨ Red streaks leading from the area. ¨ Pus draining from the area. ¨ A fever.  
  · You have signs of a blood clot in your leg (called a deep vein thrombosis), such as: 
¨ Pain in your calf, back of the knee, thigh, or groin. ¨ Redness or swelling in your leg.  
 Watch closely for changes in your health, and be sure to contact your doctor if you have any problems. Where can you learn more? Go to http://kari-mac.info/. Enter P616 in the search box to learn more about \"Hemodialysis Access: What to Expect at Home. \" Current as of: May 12, 2017 Content Version: 11.7 © 0192-7522 [employer]. Care instructions adapted under license by [employer] (which disclaims liability or warranty for this information).  If you have questions about a medical condition or this instruction, always ask your healthcare professional. [employer] disclaims any warranty or liability for your use of this information. After general anesthesia or intravenous sedation, for 24 hours or while taking prescription Narcotics: · Limit your activities · Do not drive and operate hazardous machinery · Do not make important personal or business decisions · Do  not drink alcoholic beverages · If you have not urinated within 8 hours after discharge, please contact your surgeon on call. *  Please give a list of your current medications to your Primary Care Provider. *  Please update this list whenever your medications are discontinued, doses are 
    changed, or new medications (including over-the-counter products) are added. *  Please carry medication information at all times in case of emergency situations. These are general instructions for a healthy lifestyle: No smoking/ No tobacco products/ Avoid exposure to second hand smoke Surgeon General's Warning:  Quitting smoking now greatly reduces serious risk to your health. Obesity, smoking, and sedentary lifestyle greatly increases your risk for illness A healthy diet, regular physical exercise & weight monitoring are important for maintaining a healthy lifestyle You may be retaining fluid if you have a history of heart failure or if you experience any of the following symptoms:  Weight gain of 3 pounds or more overnight or 5 pounds in a week, increased swelling in our hands or feet or shortness of breath while lying flat in bed. Please call your doctor as soon as you notice any of these symptoms; do not wait until your next office visit. Recognize signs and symptoms of STROKE: 
F-face looks uneven A-arms unable to move or move unevenly S-speech slurred or non-existent T-time-call 911 as soon as signs and symptoms begin-DO NOT go Back to bed or wait to see if you get better-TIME IS BRAIN. Introducing [facility] & HEALTH SERVICES! [facility] introduces Fluxt patient portal. Now you can access parts of your medical record, email your doctor's office, and request medication refills online. 1. In your internet browser, go to [email or web address] 2. Click on the First Time User? Click Here link in the Sign In box. You will see the New Member Sign Up page. 3. Enter your [employer] Access Code exactly as it appears below. You will not need to use this code after youve completed the sign-up process. If you do not sign up before the expiration date, you must request a new code. · [employer] Access Code: [email or web address] Expires: 8/21/2018 11:48 AM 
 
4. Enter the last four digits of your Social Security Number (xxxx) and Date of Birth (mm/dd/yyyy) as indicated and click Submit. You will be taken to the next sign-up page. 5. Create a [employer] ID. This will be your [employer] login ID and cannot be changed, so think of one that is secure and easy to remember. 6. Create a [employer] password. You can change your password at any time. 7. Enter your Password Reset Question and Answer. This can be used at a later time if you forget your password. 8. Enter your e-mail address. You will receive e-mail notification when new information is available in [address] 9. Click Sign Up. You can now view and download portions of your medical record. 10. Click the Download Summary menu link to download a portable copy of your medical information. If you have questions, please visit the Frequently Asked Questions section of the [employer] website. Remember, [employer] is NOT to be used for urgent needs. For medical emergencies, dial 911. Now available from your iPhone and Android! Introducing Teo Royal As a [facility] patient, I wanted to make you aware of our electronic visit tool called Teo Royal. [facility] 24/7 allows you to connect within minutes with a medical provider 24 hours a day, seven days a week via a mobile device or tablet or logging into a secure website from your computer. You can access [employer] from anywhere in the [place]. A virtual visit might be right for you when you have a simple condition and feel like you just dont want to get out of bed, or cant get away from work for an appointment, when your regular [facility] provider is not available (evenings, weekends or holidays), or when youre out of town and need minor care. Electronic visits cost only $49 and if [facility] provider determines a prescription is needed to treat your condition, one can be electronically transmitted to a nearby pharmacy*. Please take a moment to enroll today if you have not already done so. The enrollment process is free and takes just a few minutes. To enroll, please download the Jesika Mario 24/A Green Night's Sleep james to your tablet or phone, or visit [email or web address] to enroll on your computer. And, as an [address] [place] [address] patient with a [employer] account, the results of your visits will be scanned into your electronic medical record and your primary care provider will be able to view the scanned results. We urge you to continue to see your regular [facility] provider for your ongoing medical care. And while your primary care provider may not be the one available when you seek a Teo Adamricardo virtual visit, the peace of mind you get from getting a real diagnosis real time can be priceless. For more information on Teo Imaxiopitofin, view our Frequently Asked Questions (FAQs) at [email or web address]. Sincerely, 
 
Lainey Hall MD 
Chief Medical Officer [address] *:  certain medications cannot be prescribed via Teo Adamricardo Providers Seen During Your Hospitalization Provider Specialty Primary office phone Nirmala Parsons MD Vascular Surgery [phone] Your Primary Care Physician (PCP) Primary Care Physician Office Phone Office Fax Charli Salas [phone] You are allergic to the following No active allergies Recent Documentation Height Weight BMI OB Status Smoking Status 1.626 m 114.2 kg 43.22 kg/m2 Hysterectomy Never Smoker Emergency Contacts Name Discharge Info Relation Home Work Mobile Jt Flores  Brother [24] [phone] [place] [facility] [3] Friend [5]   [phone] Patient Belongings The following personal items are in your possession at time of discharge: 
  Dental Appliances: Lowers, Uppers  Visual Aid: Glasses      Home Medications: None   Jewelry: None  Clothing: Dress, Footwear, Undergarments    Other Valuables: Eyeglasses Please provide this summary of care documentation to your next provider. Signatures-by signing, you are acknowledging that this After Visit Summary has been reviewed with you and you have received a copy. Patient Signature:  ____________________________________________________________ Date:  ____________________________________________________________  
  
Courtney Jones Provider Signature:  ____________________________________________________________ Date:  ____________________________________________________________

## 2018-07-30 NOTE — PERIOP NOTES
PHARMACY CALLED TO HAVE POTASSIUM, MAGNESIUM AND LASIX ORDERED BY DR Graham Fernandez SENT TO PACU AT [address] [facility]

## 2018-07-30 NOTE — OP NOTES
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 PROCEDURE PERFORMED:  Elevation revision of left brachiocephalic arteriovenous fistula. SURGEON:  John Ji MD    ASSISTANT:  None. ANESTHESIA:  General with local supplement. ESTIMATED BLOOD LOSS:  30 mL    SPECIMENS REMOVED:  None. IMPLANTS:  None. COMPLICATIONS:  None. STATEMENT OF MEDICAL NECESSITY:  The patient is a 78-year-old woman with a left brachiocephalic arteriovenous fistula. By ultrasound, the fistula is maturing adequately. However, due to the size of her arm, the fistula is too deep to permit safe cannulation and so elevation is required. PROCEDURE:  The patient was taken to the operating room and a general anesthetic was administered. Left upper extremity was prepped and draped in the usual sterile fashion. The skin was anesthetized over the initial portion of the fistula just at and above the antecubital fossa and a small longitudinal incision was made to expose the fistula there. There was a large lateral side branch near the anastomosis and this was ligated with 2-0 silk, but not divided. The remainder of the fistula was sequentially exposed by extending the incision gradually in increments of approximately 4 cm until it was at the shoulder. The fistula was found to be mildly tortuous, but otherwise without any structural issues. The fistula appears to be maturing and there are no sclerotic segments. The fistula was carefully dissected away from the adjacent soft tissue and side branches were divided between ligatures of 2-0 and/or 3-0 silk. Some reinforced with clips.     The fistula was completely mobilized and elevated and easily reached the subcutaneous plane  tension free. The incision was thoroughly irrigated and hemostasis was achieved. A small amount of fibrillar was placed at the base of the incision. The subcutaneous fascia was reapproximated deep to the fistula with interrupted 3-0 Vicryl sutures in order to render the fistula in a more superficial plane. The overlying skin was then closed with running 4-0 subcuticular Monocryl reinforced with simple 4-0 nylon skin sutures. A tissue adhesive and a sterile dressing were applied. The patient tolerated the procedure well and went to recovery in stable condition.       MD HUDSON Martinez / KAE  D: 07/30/2018 10:52     T: 07/30/2018 11:38  JOB #: [id number]

## 2018-07-30 NOTE — ANESTHESIA PREPROCEDURE EVALUATION
Anesthetic History PONV Review of Systems / Medical History Patient summary reviewed and pertinent labs reviewed Pulmonary Within defined limits Neuro/Psych Within defined limits Cardiovascular Hypertension: well controlled Hyperlipidemia Exercise tolerance: <4 METS Comments: 90% RA sats. GI/Hepatic/Renal 
  
 
 
Renal disease (has not been dialy. yet): ESRD Pertinent negatives: No GERD Endo/Other Diabetes: well controlled, type 2, using insulin Morbid obesity Other Findings Physical Exam 
 
Airway Mallampati: II 
TM Distance: < 4 cm Neck ROM: normal range of motion Mouth opening: Normal 
 
 Cardiovascular Rhythm: regular Rate: normal 
Peripheral edema (large pitting edema in legs into thighs) Murmur: Grade 2 and 1, Aortic area Dental 
 
Dentition: Full lower dentures, Full upper dentures and Edentulous Pulmonary Decreased breath sounds: bilateral 
 
Rales:bibasilar Abdominal 
 
 
 
 Other Findings Anesthetic Plan ASA: 3 Anesthesia type: general 
 
 
 
 
Induction: Intravenous Anesthetic plan and risks discussed with: Patient AMBAR

## 2018-07-30 NOTE — ANESTHESIA POSTPROCEDURE EVALUATION
Post-Anesthesia Evaluation and Assessment Patient: Jules Whitfield MRN: [id number]  SSN: [id number] YOB: 1950  Age: 69 y.o. Sex: female Cardiovascular Function/Vital Signs Visit Vitals  /79  Pulse (!) 59  Temp 36.7 °C (98.1 °F)  Resp 18  Ht 5' 4\" (1.626 m)  Wt 114.2 kg (251 lb 12.8 oz)  SpO2 96%  BMI 43.22 kg/m2 Patient is status post general anesthesia for Procedure(s): LEFT ARM  ARTERIO VENOUS FISTULA  ELEVATION. Nausea/Vomiting: None Postoperative hydration reviewed and adequate. Pain: 
Pain Scale 1: Numeric (0 - 10) (07/30/18 1116) Pain Intensity 1: 0 (07/30/18 1116) Managed Neurological Status:  
Neuro (WDL): Exceptions to WDL (07/30/18 1116) Neuro Neurologic State: Alert (07/30/18 1116) Orientation Level: Oriented X4 (07/30/18 1116) Cognition: Appropriate decision making; Appropriate for age attention/concentration; Appropriate safety awareness; Follows commands (07/30/18 1116) Speech: Clear (07/30/18 1116) LUE Motor Response: Purposeful (07/30/18 1116) LLE Motor Response: Purposeful (07/30/18 1116) RUE Motor Response: Purposeful (07/30/18 1116) RLE Motor Response: Purposeful (07/30/18 1116) At baseline Mental Status and Level of Consciousness: Arousable Pulmonary Status:  
O2 Device: Room air (07/30/18 1321) Adequate oxygenation and airway patent Complications related to anesthesia: None Post-anesthesia assessment completed. No concerns Signed By: Alton Linares MD   
 July 30, 2018

## 2018-07-30 NOTE — DISCHARGE INSTRUCTIONS
Hemodialysis Access: What to Expect at [facility]  Hemodialysis is a way to remove wastes from the blood when your kidneys can no longer do the job. It is not a cure, but it can help you live longer and feel better. It is a lifesaving treatment when you have kidney failure. Hemodialysis is often called dialysis. Your doctor created a place (called an access) in your arm for your blood to flow in and out of your body during your dialysis sessions. Your arm will probably be bruised and swollen. It may hurt. The cut (incision) may bleed. The pain and bleeding will get better over several days. You will probably need only over-the-counter pain medicine. You can reduce swelling by propping your arm on 1 or 2 pillows and keeping your elbow straight. You will have stitches. These may dissolve on their own, or your doctor will tell you when to come in to have them removed. You should also be able to return to work in a few days. You may feel some coolness or numbness in your hand. These feelings usually go away in a few weeks. Your doctor may suggest squeezing a soft object. This will strengthen your access and may make hemodialysis faster and easier. You should always be able to feel blood rushing through the fistula or graft. It feels like a slight vibration when you put your fingers on the skin over the fistula or graft. This feeling is called a thrill or pulse. This care sheet gives you a general idea about how long it will take for you to recover. But each person recovers at a different pace. Follow the steps below to get better as quickly as possible. How can you care for yourself at home? Activity    · Rest when you feel tired. Getting enough sleep will help you recover.  Do not lie on or sleep on the arm with the access.     · Avoid activities such as washing windows or gardening that put stress on the arm with the access.     · You may use your arm, but do not lift anything that weighs more than about 5 pounds with the left arm. This may include a child, heavy grocery bags, a heavy briefcase or backpack, cat litter or dog food bags, or a vacuum .     · You can shower, but keep the access dry for the first 2 days. Cover the area with a plastic bag to keep it dry.     · Do not soak or scrub the incision until it has healed.     · Wear an arm guard to protect the area if you play sports or work with your arms.     · You may drive when your doctor says it is okay. This is usually in 1 to 2 days.     · Most people are able to return to work about 1 or 2 days after surgery. Diet    · Follow an eating plan that is good for your kidneys. A registered dietitian can help you make a meal plan that is right for you. You may need to limit protein, salt, fluids, and certain foods. Medicines    · Your doctor will tell you if and when you can restart your medicines. He or she will also give you instructions about taking any new medicines.     · If you take blood thinners, such as warfarin (Coumadin), clopidogrel (Plavix), or aspirin, be sure to talk to your doctor. He or she will tell you if and when to start taking those medicines again. Make sure that you understand exactly what your doctor wants you to do.     · Take pain medicines exactly as directed. ¨ If the doctor gave you a prescription medicine for pain, take it as prescribed. ¨ If you are not taking a prescription pain medicine, ask your doctor if you can take acetaminophen (Tylenol). Do not take ibuprofen (Advil, Motrin) or naproxen (Aleve), or similar medicines, unless your doctor tells you to. They may make chronic kidney disease worse. ¨ Do not take two or more pain medicines at the same time unless the doctor told you to. Many pain medicines have acetaminophen, which is Tylenol.  Too much acetaminophen (Tylenol) can be harmful.     · If you think your pain medicine is making you sick to your stomach:  ¨ Take your medicine after meals (unless your doctor has told you not to). ¨ Ask your doctor for a different pain medicine.     · If your doctor prescribed antibiotics, take them as directed. Do not stop taking them just because you feel better. You need to take the full course of antibiotics. Incision care    · Keep the area dry until the outer gauze dressing is removed in 3-4 days. · You may remove the gauze dressing at home in 3-4 days. If you want help with it, call our office at [phone] and one of our staff will remove it for you. · After the dressing is removed, gently wash the area with soap and water every day, and always before dialysis.     · Do not soak or scrub the incision until it has healed.        Exercise    · Squeeze a soft ball or other object as your doctor tells you. This will help blood flow through the access and help prevent blood clots.    Elevation    · Prop up the sore arm on a pillow anytime you sit or lie down during the next 3 days. Try to keep it above the level of your heart. This will help reduce swelling. Other instructions    · Every day, check your access for a pulse or thrill in the fistula or graft area. A thrill is a vibration. To feel a pulse or thrill, place the first two fingers of your hand over the access.     · Do not bump your arm.     · Do not wear tight clothing, jewelry, or anything else that may squeeze the access.     · Use your other arm to have blood drawn or blood pressure taken.     · Do not put cream or lotion on or near the access.     · Make sure all doctors you deal with know you have a vascular access. Follow-up care is a key part of your treatment and safety. Be sure to make and go to all appointments, and call your doctor if you are having problems. It's also a good idea to know your test results and keep a list of the medicines you take. When should you call for help? Call 911 anytime you think you may need emergency care.  For example, call if:    · You passed out (lost consciousness).     · You have chest pain, are short of breath, or cough up blood.    Call your doctor now or seek immediate medical care if:    · Your hand or arm is cold or dark-colored.     · You have no pulse in your access.     · You have nausea or you vomit.     · You have pain that does not get better after you take pain medicine.     · You have loose stitches, or your incision comes open.     · You are bleeding from the incision.     · You have signs of infection, such as:  ¨ Increased pain, swelling, warmth, or redness. ¨ Red streaks leading from the area. ¨ Pus draining from the area. ¨ A fever.     · You have signs of a blood clot in your leg (called a deep vein thrombosis), such as:  ¨ Pain in your calf, back of the knee, thigh, or groin. ¨ Redness or swelling in your leg.    Watch closely for changes in your health, and be sure to contact your doctor if you have any problems. Where can you learn more? Go to http://kari-mac.info/. Enter P616 in the search box to learn more about \"Hemodialysis Access: What to Expect at Home. \"  Current as of: May 12, 2017  Content Version: 11.7  © 8893-0422 [employer]. Care instructions adapted under license by [employer] (which disclaims liability or warranty for this information). If you have questions about a medical condition or this instruction, always ask your healthcare professional. [place] [address] any warranty or liability for your use of this information. After general anesthesia or intravenous sedation, for 24 hours or while taking prescription Narcotics:  · Limit your activities  · Do not drive and operate hazardous machinery  · Do not make important personal or business decisions  · Do  not drink alcoholic beverages  · If you have not urinated within 8 hours after discharge, please contact your surgeon on call.     *  Please give a list of your current medications to your Primary Care Provider. *  Please update this list whenever your medications are discontinued, doses are      changed, or new medications (including over-the-counter products) are added. *  Please carry medication information at all times in case of emergency situations. These are general instructions for a healthy lifestyle:  No smoking/ No tobacco products/ Avoid exposure to second hand smoke  Surgeon General's Warning:  Quitting smoking now greatly reduces serious risk to your health. Obesity, smoking, and sedentary lifestyle greatly increases your risk for illness  A healthy diet, regular physical exercise & weight monitoring are important for maintaining a healthy lifestyle    You may be retaining fluid if you have a history of heart failure or if you experience any of the following symptoms:  Weight gain of 3 pounds or more overnight or 5 pounds in a week, increased swelling in our hands or feet or shortness of breath while lying flat in bed. Please call your doctor as soon as you notice any of these symptoms; do not wait until your next office visit. Recognize signs and symptoms of STROKE:  F-face looks uneven  A-arms unable to move or move unevenly  S-speech slurred or non-existent  T-time-call 911 as soon as signs and symptoms begin-DO NOT go       Back to bed or wait to see if you get better-TIME IS BRAIN.

## 2021-08-23 ENCOUNTER — ANESTHESIA EVENT (OUTPATIENT)
Dept: SURGERY | Age: 71
End: 2021-08-23
Payer: MEDICARE

## 2021-08-23 ENCOUNTER — APPOINTMENT (OUTPATIENT)
Dept: GENERAL RADIOLOGY | Age: 71
End: 2021-08-23
Attending: SURGERY
Payer: MEDICARE

## 2021-08-23 ENCOUNTER — APPOINTMENT (OUTPATIENT)
Dept: INTERVENTIONAL RADIOLOGY/VASCULAR | Age: 71
End: 2021-08-23
Attending: SURGERY
Payer: MEDICARE

## 2021-08-23 ENCOUNTER — ANESTHESIA (OUTPATIENT)
Dept: SURGERY | Age: 71
End: 2021-08-23
Payer: MEDICARE

## 2021-08-23 ENCOUNTER — HOSPITAL ENCOUNTER (OUTPATIENT)
Age: 71
Setting detail: OUTPATIENT SURGERY
Discharge: HOME | End: 2021-08-23
Attending: SURGERY | Admitting: SURGERY
Payer: MEDICARE

## 2021-08-23 VITALS
HEIGHT: 64 IN | DIASTOLIC BLOOD PRESSURE: 71 MMHG | OXYGEN SATURATION: 97 % | HEART RATE: 53 BPM | SYSTOLIC BLOOD PRESSURE: 164 MMHG | TEMPERATURE: 97.3 F | WEIGHT: 191.6 LBS | RESPIRATION RATE: 16 BRPM | BODY MASS INDEX: 32.71 KG/M2

## 2021-08-23 DIAGNOSIS — E66.01 OBESITY, MORBID (HCC): ICD-10-CM

## 2021-08-23 LAB
ANION GAP SERPL CALC-SCNC: 9 MMOL/L (ref 7–16)
ATRIAL RATE: 53 BPM
BUN SERPL-MCNC: 64 MG/DL (ref 8–23)
CALCIUM SERPL-MCNC: 8.3 MG/DL (ref 8.3–10.4)
CALCULATED P AXIS, ECG09: 64 DEGREES
CALCULATED R AXIS, ECG10: 1 DEGREES
CALCULATED T AXIS, ECG11: -153 DEGREES
CHLORIDE SERPL-SCNC: 102 MMOL/L (ref 98–107)
CO2 SERPL-SCNC: 27 MMOL/L (ref 21–32)
CREAT SERPL-MCNC: 10.4 MG/DL (ref 0.6–1)
DIAGNOSIS, 93000: NORMAL
GLUCOSE BLD STRIP.AUTO-MCNC: 112 MG/DL (ref 65–100)
GLUCOSE BLD STRIP.AUTO-MCNC: 45 MG/DL (ref 65–100)
GLUCOSE BLD STRIP.AUTO-MCNC: 47 MG/DL (ref 65–100)
GLUCOSE BLD STRIP.AUTO-MCNC: 66 MG/DL (ref 65–100)
GLUCOSE SERPL-MCNC: 162 MG/DL (ref 65–100)
HGB BLD-MCNC: 10.9 G/DL (ref 11.7–15.4)
P-R INTERVAL, ECG05: 220 MS
POTASSIUM BLD-SCNC: 6.9 MMOL/L (ref 3.5–5.1)
POTASSIUM SERPL-SCNC: 4.7 MMOL/L (ref 3.5–5.1)
Q-T INTERVAL, ECG07: 480 MS
QRS DURATION, ECG06: 86 MS
QTC CALCULATION (BEZET), ECG08: 450 MS
SERVICE CMNT-IMP: ABNORMAL
SERVICE CMNT-IMP: NORMAL
SODIUM SERPL-SCNC: 138 MMOL/L (ref 136–145)
VENTRICULAR RATE, ECG03: 53 BPM

## 2021-08-23 PROCEDURE — 77030037088 HC TUBE ENDOTRACH ORAL NSL COVD-A: Performed by: ANESTHESIOLOGY

## 2021-08-23 PROCEDURE — C1757 CATH, THROMBECTOMY/EMBOLECT: HCPCS | Performed by: SURGERY

## 2021-08-23 PROCEDURE — 77030018719 HC DRSG PTCH ANTIMIC J&J -A

## 2021-08-23 PROCEDURE — 77030002933 HC SUT MCRYL J&J -A: Performed by: SURGERY

## 2021-08-23 PROCEDURE — 77030002916 HC SUT ETHLN J&J -A

## 2021-08-23 PROCEDURE — 76010000161 HC OR TIME 1 TO 1.5 HR INTENSV-TIER 1: Performed by: SURGERY

## 2021-08-23 PROCEDURE — 2709999900 HC NON-CHARGEABLE SUPPLY: Performed by: SURGERY

## 2021-08-23 PROCEDURE — 77030031131 HC SUT MXN P COVD -B

## 2021-08-23 PROCEDURE — 76000 FLUOROSCOPY <1 HR PHYS/QHP: CPT

## 2021-08-23 PROCEDURE — 77030008584 HC TOOL GDWRE DEV TERU -A: Performed by: SURGERY

## 2021-08-23 PROCEDURE — 77030040361 HC SLV COMPR DVT MDII -B: Performed by: SURGERY

## 2021-08-23 PROCEDURE — 74011000250 HC RX REV CODE- 250

## 2021-08-23 PROCEDURE — 80048 BASIC METABOLIC PNL TOTAL CA: CPT

## 2021-08-23 PROCEDURE — 77030034888 HC SUT PROL 2 J&J -B: Performed by: SURGERY

## 2021-08-23 PROCEDURE — 77030031139 HC SUT VCRL2 J&J -A: Performed by: SURGERY

## 2021-08-23 PROCEDURE — C1894 INTRO/SHEATH, NON-LASER: HCPCS | Performed by: SURGERY

## 2021-08-23 PROCEDURE — 74011250636 HC RX REV CODE- 250/636

## 2021-08-23 PROCEDURE — 76210000063 HC OR PH I REC FIRST 0.5 HR: Performed by: SURGERY

## 2021-08-23 PROCEDURE — 74011000250 HC RX REV CODE- 250: Performed by: ANESTHESIOLOGY

## 2021-08-23 PROCEDURE — 93005 ELECTROCARDIOGRAM TRACING: CPT | Performed by: SURGERY

## 2021-08-23 PROCEDURE — 74011250636 HC RX REV CODE- 250/636: Performed by: SURGERY

## 2021-08-23 PROCEDURE — 77030021532 HC CATH ANGI DX IMPRS MRTM -B: Performed by: SURGERY

## 2021-08-23 PROCEDURE — 77030037400 HC ADH TISS HI VISC EXOFIN CHMP -B: Performed by: SURGERY

## 2021-08-23 PROCEDURE — 74011250636 HC RX REV CODE- 250/636: Performed by: ANESTHESIOLOGY

## 2021-08-23 PROCEDURE — C1750 CATH, HEMODIALYSIS,LONG-TERM: HCPCS

## 2021-08-23 PROCEDURE — 77030010507 HC ADH SKN DERMBND J&J -B

## 2021-08-23 PROCEDURE — C1769 GUIDE WIRE: HCPCS | Performed by: SURGERY

## 2021-08-23 PROCEDURE — 84132 ASSAY OF SERUM POTASSIUM: CPT

## 2021-08-23 PROCEDURE — 77001 FLUOROGUIDE FOR VEIN DEVICE: CPT

## 2021-08-23 PROCEDURE — 74011000250 HC RX REV CODE- 250: Performed by: PHYSICIAN ASSISTANT

## 2021-08-23 PROCEDURE — C1894 INTRO/SHEATH, NON-LASER: HCPCS

## 2021-08-23 PROCEDURE — 77030039425 HC BLD LARYNG TRULITE DISP TELE -A: Performed by: ANESTHESIOLOGY

## 2021-08-23 PROCEDURE — 85018 HEMOGLOBIN: CPT

## 2021-08-23 PROCEDURE — 74011000636 HC RX REV CODE- 636: Performed by: SURGERY

## 2021-08-23 PROCEDURE — 74011250636 HC RX REV CODE- 250/636: Performed by: PHYSICIAN ASSISTANT

## 2021-08-23 PROCEDURE — 36831 OPEN THROMBECT AV FISTULA: CPT | Performed by: SURGERY

## 2021-08-23 PROCEDURE — 82962 GLUCOSE BLOOD TEST: CPT

## 2021-08-23 PROCEDURE — 77030002987 HC SUT PROL J&J -B: Performed by: SURGERY

## 2021-08-23 PROCEDURE — 76060000033 HC ANESTHESIA 1 TO 1.5 HR: Performed by: SURGERY

## 2021-08-23 RX ORDER — LIDOCAINE HYDROCHLORIDE 20 MG/ML
INJECTION, SOLUTION EPIDURAL; INFILTRATION; INTRACAUDAL; PERINEURAL AS NEEDED
Status: DISCONTINUED | OUTPATIENT
Start: 2021-08-23 | End: 2021-08-23 | Stop reason: HOSPADM

## 2021-08-23 RX ORDER — MIDAZOLAM HYDROCHLORIDE 1 MG/ML
.5-2 INJECTION, SOLUTION INTRAMUSCULAR; INTRAVENOUS
Status: DISCONTINUED | OUTPATIENT
Start: 2021-08-23 | End: 2021-08-23

## 2021-08-23 RX ORDER — CEFAZOLIN SODIUM/WATER 2 G/20 ML
2 SYRINGE (ML) INTRAVENOUS ONCE
Status: COMPLETED | OUTPATIENT
Start: 2021-08-23 | End: 2021-08-23

## 2021-08-23 RX ORDER — ONDANSETRON 2 MG/ML
4 INJECTION INTRAMUSCULAR; INTRAVENOUS
Status: COMPLETED | OUTPATIENT
Start: 2021-08-23 | End: 2021-08-23

## 2021-08-23 RX ORDER — PROPOFOL 10 MG/ML
INJECTION, EMULSION INTRAVENOUS AS NEEDED
Status: DISCONTINUED | OUTPATIENT
Start: 2021-08-23 | End: 2021-08-23 | Stop reason: HOSPADM

## 2021-08-23 RX ORDER — FENTANYL CITRATE 50 UG/ML
INJECTION, SOLUTION INTRAMUSCULAR; INTRAVENOUS AS NEEDED
Status: DISCONTINUED | OUTPATIENT
Start: 2021-08-23 | End: 2021-08-23 | Stop reason: HOSPADM

## 2021-08-23 RX ORDER — LIDOCAINE HYDROCHLORIDE AND EPINEPHRINE 10; 10 MG/ML; UG/ML
1-20 INJECTION, SOLUTION INFILTRATION; PERINEURAL
Status: DISCONTINUED | OUTPATIENT
Start: 2021-08-23 | End: 2021-08-23

## 2021-08-23 RX ORDER — SODIUM CHLORIDE 9 MG/ML
25 INJECTION, SOLUTION INTRAVENOUS ONCE
Status: COMPLETED | OUTPATIENT
Start: 2021-08-23 | End: 2021-08-23

## 2021-08-23 RX ORDER — HYDRALAZINE HYDROCHLORIDE 20 MG/ML
INJECTION INTRAMUSCULAR; INTRAVENOUS AS NEEDED
Status: DISCONTINUED | OUTPATIENT
Start: 2021-08-23 | End: 2021-08-23 | Stop reason: HOSPADM

## 2021-08-23 RX ORDER — OXYCODONE HYDROCHLORIDE 5 MG/1
5 TABLET ORAL
Status: DISCONTINUED | OUTPATIENT
Start: 2021-08-23 | End: 2021-08-24 | Stop reason: HOSPADM

## 2021-08-23 RX ORDER — DEXTROSE 50 % IN WATER (D50W) INTRAVENOUS SYRINGE
25
Status: COMPLETED | OUTPATIENT
Start: 2021-08-23 | End: 2021-08-23

## 2021-08-23 RX ORDER — OXYCODONE HYDROCHLORIDE 5 MG/1
10 TABLET ORAL
Status: DISCONTINUED | OUTPATIENT
Start: 2021-08-23 | End: 2021-08-24 | Stop reason: HOSPADM

## 2021-08-23 RX ORDER — ROCURONIUM BROMIDE 10 MG/ML
INJECTION, SOLUTION INTRAVENOUS AS NEEDED
Status: DISCONTINUED | OUTPATIENT
Start: 2021-08-23 | End: 2021-08-23 | Stop reason: HOSPADM

## 2021-08-23 RX ORDER — GLYCOPYRROLATE 0.2 MG/ML
INJECTION INTRAMUSCULAR; INTRAVENOUS AS NEEDED
Status: DISCONTINUED | OUTPATIENT
Start: 2021-08-23 | End: 2021-08-23 | Stop reason: HOSPADM

## 2021-08-23 RX ORDER — HEPARIN SODIUM 1000 [USP'U]/ML
INJECTION, SOLUTION INTRAVENOUS; SUBCUTANEOUS AS NEEDED
Status: DISCONTINUED | OUTPATIENT
Start: 2021-08-23 | End: 2021-08-23 | Stop reason: HOSPADM

## 2021-08-23 RX ORDER — NEOSTIGMINE METHYLSULFATE 1 MG/ML
INJECTION, SOLUTION INTRAVENOUS AS NEEDED
Status: DISCONTINUED | OUTPATIENT
Start: 2021-08-23 | End: 2021-08-23 | Stop reason: HOSPADM

## 2021-08-23 RX ORDER — FENTANYL CITRATE 50 UG/ML
25-100 INJECTION, SOLUTION INTRAMUSCULAR; INTRAVENOUS
Status: DISCONTINUED | OUTPATIENT
Start: 2021-08-23 | End: 2021-08-23

## 2021-08-23 RX ORDER — HEPARIN SODIUM 5000 [USP'U]/ML
INJECTION, SOLUTION INTRAVENOUS; SUBCUTANEOUS AS NEEDED
Status: DISCONTINUED | OUTPATIENT
Start: 2021-08-23 | End: 2021-08-23 | Stop reason: HOSPADM

## 2021-08-23 RX ORDER — SODIUM CHLORIDE 9 MG/ML
25 INJECTION, SOLUTION INTRAVENOUS CONTINUOUS
Status: DISCONTINUED | OUTPATIENT
Start: 2021-08-23 | End: 2021-08-23 | Stop reason: HOSPADM

## 2021-08-23 RX ORDER — SODIUM CHLORIDE, SODIUM LACTATE, POTASSIUM CHLORIDE, CALCIUM CHLORIDE 600; 310; 30; 20 MG/100ML; MG/100ML; MG/100ML; MG/100ML
75 INJECTION, SOLUTION INTRAVENOUS CONTINUOUS
Status: DISCONTINUED | OUTPATIENT
Start: 2021-08-23 | End: 2021-08-24 | Stop reason: HOSPADM

## 2021-08-23 RX ORDER — EPHEDRINE SULFATE/0.9% NACL/PF 50 MG/5 ML
SYRINGE (ML) INTRAVENOUS AS NEEDED
Status: DISCONTINUED | OUTPATIENT
Start: 2021-08-23 | End: 2021-08-23 | Stop reason: HOSPADM

## 2021-08-23 RX ORDER — CALCIUM GLUCONATE 20 MG/ML
2 INJECTION, SOLUTION INTRAVENOUS ONCE
Status: DISCONTINUED | OUTPATIENT
Start: 2021-08-23 | End: 2021-08-23 | Stop reason: HOSPADM

## 2021-08-23 RX ORDER — SODIUM CHLORIDE 9 MG/ML
INJECTION, SOLUTION INTRAVENOUS
Status: DISCONTINUED | OUTPATIENT
Start: 2021-08-23 | End: 2021-08-23 | Stop reason: HOSPADM

## 2021-08-23 RX ORDER — HEPARIN SODIUM 1000 [USP'U]/ML
1-10000 INJECTION, SOLUTION INTRAVENOUS; SUBCUTANEOUS
Status: DISCONTINUED | OUTPATIENT
Start: 2021-08-23 | End: 2021-08-23

## 2021-08-23 RX ORDER — HYDROMORPHONE HYDROCHLORIDE 2 MG/ML
0.5 INJECTION, SOLUTION INTRAMUSCULAR; INTRAVENOUS; SUBCUTANEOUS
Status: DISCONTINUED | OUTPATIENT
Start: 2021-08-23 | End: 2021-08-23

## 2021-08-23 RX ADMIN — DEXTROSE MONOHYDRATE 12.5 G: 25 INJECTION, SOLUTION INTRAVENOUS at 11:25

## 2021-08-23 RX ADMIN — PROMETHAZINE HYDROCHLORIDE 3.25 MG: 25 INJECTION INTRAMUSCULAR; INTRAVENOUS at 13:28

## 2021-08-23 RX ADMIN — PROPOFOL 90 MG: 10 INJECTION, EMULSION INTRAVENOUS at 12:11

## 2021-08-23 RX ADMIN — LIDOCAINE HYDROCHLORIDE,EPINEPHRINE BITARTRATE 120 MG: 10; .01 INJECTION, SOLUTION INFILTRATION; PERINEURAL at 15:33

## 2021-08-23 RX ADMIN — FENTANYL CITRATE 25 MCG: 50 INJECTION INTRAMUSCULAR; INTRAVENOUS at 13:05

## 2021-08-23 RX ADMIN — HEPARIN SODIUM 2000 UNITS: 1000 INJECTION, SOLUTION INTRAVENOUS; SUBCUTANEOUS at 12:42

## 2021-08-23 RX ADMIN — SODIUM CHLORIDE: 900 INJECTION, SOLUTION INTRAVENOUS at 12:04

## 2021-08-23 RX ADMIN — GLYCOPYRROLATE 0.2 MG: 0.2 INJECTION, SOLUTION INTRAMUSCULAR; INTRAVENOUS at 12:22

## 2021-08-23 RX ADMIN — DEXTROSE 25 ML: 50 INJECTION, SOLUTION INTRAVENOUS at 12:29

## 2021-08-23 RX ADMIN — MIDAZOLAM 1 MG: 1 INJECTION INTRAMUSCULAR; INTRAVENOUS at 15:24

## 2021-08-23 RX ADMIN — Medication 3 MG: at 12:55

## 2021-08-23 RX ADMIN — FENTANYL CITRATE 25 MCG: 50 INJECTION, SOLUTION INTRAMUSCULAR; INTRAVENOUS at 15:35

## 2021-08-23 RX ADMIN — CEFAZOLIN 2 G: 1 INJECTION, POWDER, FOR SOLUTION INTRAVENOUS at 12:25

## 2021-08-23 RX ADMIN — LIDOCAINE HYDROCHLORIDE 60 MG: 20 INJECTION, SOLUTION EPIDURAL; INFILTRATION; INTRACAUDAL; PERINEURAL at 12:11

## 2021-08-23 RX ADMIN — PHENYLEPHRINE HYDROCHLORIDE 120 MCG: 10 INJECTION INTRAVENOUS at 12:48

## 2021-08-23 RX ADMIN — SODIUM CHLORIDE 25 ML/HR: 900 INJECTION, SOLUTION INTRAVENOUS at 15:20

## 2021-08-23 RX ADMIN — GLYCOPYRROLATE 0.4 MG: 0.2 INJECTION, SOLUTION INTRAMUSCULAR; INTRAVENOUS at 12:55

## 2021-08-23 RX ADMIN — FENTANYL CITRATE 25 MCG: 50 INJECTION INTRAMUSCULAR; INTRAVENOUS at 12:11

## 2021-08-23 RX ADMIN — HEPARIN SODIUM 5000 UNITS: 1000 INJECTION, SOLUTION INTRAVENOUS; SUBCUTANEOUS at 12:33

## 2021-08-23 RX ADMIN — DEXTROSE MONOHYDRATE 12.5 G: 25 INJECTION, SOLUTION INTRAVENOUS at 10:55

## 2021-08-23 RX ADMIN — Medication 15 MG: at 12:27

## 2021-08-23 RX ADMIN — ONDANSETRON 4 MG: 2 INJECTION INTRAMUSCULAR; INTRAVENOUS at 14:50

## 2021-08-23 RX ADMIN — FENTANYL CITRATE 50 MCG: 50 INJECTION, SOLUTION INTRAMUSCULAR; INTRAVENOUS at 15:24

## 2021-08-23 RX ADMIN — FENTANYL CITRATE 50 MCG: 50 INJECTION INTRAMUSCULAR; INTRAVENOUS at 13:03

## 2021-08-23 RX ADMIN — MIDAZOLAM 0.5 MG: 1 INJECTION INTRAMUSCULAR; INTRAVENOUS at 15:35

## 2021-08-23 RX ADMIN — Medication 15 MG: at 12:44

## 2021-08-23 RX ADMIN — HYDRALAZINE HYDROCHLORIDE 10 MG: 20 INJECTION INTRAMUSCULAR; INTRAVENOUS at 12:19

## 2021-08-23 RX ADMIN — ROCURONIUM BROMIDE 40 MG: 10 INJECTION, SOLUTION INTRAVENOUS at 12:12

## 2021-08-23 RX ADMIN — HEPARIN SODIUM 4200 UNITS: 1000 INJECTION INTRAVENOUS; SUBCUTANEOUS at 15:40

## 2021-08-23 RX ADMIN — SODIUM CHLORIDE 25 ML/HR: 900 INJECTION, SOLUTION INTRAVENOUS at 11:28

## 2021-08-23 NOTE — DISCHARGE INSTRUCTIONS
INSTRUCTIONS FOR A-V FISTULA, GRAFT ACCESS, REVISION OR DECLOT    ACTIVITY  · As tolerated and as directed by your doctor. · Keep arm straight and raised above heart level for the next 24 hours. DIET  · Clear liquids until no nausea or vomiting; then light diet for the first day. · Advance to regular diet on second day, unless your doctor orders otherwise. · If nausea and vomiting continues, call your doctor. PAIN  · Take pain medication as directed by your doctor. · Call your doctor if pain is NOT relieved by the medication. · DO NOT take aspirin or blood thinners until directed by your doctor. MEDICATION INTERACTION:  During your procedure you potentially received a medication or medications which may reduce the effectiveness of oral contraceptives. Please consider other forms of contraception for 1 month following your procedure if you are currently using oral contraceptives as your primary form of birth control. In addition to this, we recommend continuing your oral contraceptive as prescribed, unless otherwise instructed by your physician, during this time    DRESSING CARE  · Keep your dressing clean and dry  · Leave the dressing on until your doctor tells you to remove it, or your dialysis nurse takes it off if you have an upcoming dialysis appointment. · If you have an ace wrap on, please remove it on ________. CARE OF YOUR ACCESS  DO:  · Keep access area clean with soap and water daily after dressing has been removed. · Feel for the thrill daily. (by placing your fingers over the graft you will be able to feel a vibration (thrill) which means blood is flowing and the graft is working.)  DO NOT:  · Wear tight sleeves, watches, belts or bracelets over graft. · Carry heavy bags across the graft. · Sleep on graft side. · Let your blood pressure be taken on graft side. · Let blood be drawn from your graft side.     CALL YOUR DOCTOR IF  · Excessive bleeding that does not stop after holding mild pressure over area. · Temperature of 101 degrees F or above. · Redness, excessive swelling or bruising, and/or green or yellow, smelly discharge from incision   · Loss of sensation-cold, white, or blue fingers or toes. After general anesthesia or intravenous sedation, for 24 hours or while taking prescription Narcotics:  · Limit your activities  · A responsible adult needs to be with you for the next 24 hours  · Do not drive and operate hazardous machinery  · Do not make important personal or business decisions  · Do not drink alcoholic beverages  · If you have not urinated within 8 hours after discharge, and you are experiencing discomfort from urinary retention, please go to the nearest ED. · If you have sleep apnea and have a CPAP machine, please use it for all naps and sleeping. · Please use caution when taking narcotics and any of your home medications that may cause drowsiness. *  Please give a list of your current medications to your Primary Care Provider. *  Please update this list whenever your medications are discontinued, doses are      changed, or new medications (including over-the-counter products) are added. *  Please carry medication information at all times in case of emergency situations. These are general instructions for a healthy lifestyle:  No smoking/ No tobacco products/ Avoid exposure to second hand smoke  Surgeon General's Warning:  Quitting smoking now greatly reduces serious risk to your health. Obesity, smoking, and sedentary lifestyle greatly increases your risk for illness  A healthy diet, regular physical exercise & weight monitoring are important for maintaining a healthy lifestyle      [address] [facility]   [address]  Department of Interventional Radiology  [facility]  [phone] Office  [phone] Fax    Tunneled or Non Tunneled Catheter Discharge Instructions    General Information:   A catheter, either tunneled (permanent) or non-tunneled (temporary) catheter was inserted into your neck today for the purpose of Cancer treatment (apheresis) or dialysis. Your catheter will be used for the length of your apheresis, or until you get a fistula placed in surgery for dialysis. After this time, your catheter may be removed. You may return to our department for the catheter removal.  A non-tunneled catheter will exit at the neck. There will be three ports, two of which will be used for dialysis or apheresis. The one smaller port in the middle can be used like a regular IV. It ideally is used only for about two weeks. A tunneled catheter will exit lower down on the chest wall, and can be used for a longer period of time. There is no IV port on these. The tunneled catheter is used only for dialysis. In case of emergencies only can drugs be given through these catheters and only with written permission from your doctor. Home Care Instructions: You can resume your regular diet. Do not drink alcohol, drive, or make any important legal decisions in the next 24 hours. Watch the site carefully for signs of infection, like fever, drainage, redness, and/or swelling. Your catheter should be \"packed\" with heparin after each use or at least once a week if it is not being used. This \"packing\" should only be done by nurses experienced with caring for this type of catheter. You may shower in 24 hours, but you need to cover the catheter with plastic wrap and tape to keep it dry while you are showering. Keep the site clean, dry, and protected. Do not immerse yourself in water like in the case of tub baths or swimming as long as you have the catheter. Call If:   You should call your Physician and/or the Radiology Nurse if you have any signs of infection, shortness of breath, or if the dressing should come off or become moist.  You will be instructed on where to go for a new dressing.   You should not have to change the dressings yourself, as that will be done by the nurses who access the catheter. Follow-Up Instructions:  Please see your ordering doctor as he/she has requested. To Reach Us: If you have any questions about your procedure, please call the Interventional Radiology department at [phone]. After business hours (5pm) and weekends, call the answering service at [phone] and ask for the Radiologist on call to be paged. Si tiene Preguntas acerca del procedimiento, por favor llame al departamento de Radiología Intervencional al [phone]. Después de horas de oficina (5 pm) y los fines de [place], llamar al Ian Venegas al [phone] y pregunte por el Radiologo de [facility]. Interventional Radiology General Nurse Discharge    After general anesthesia or intravenous sedation, for 24 hours or while taking prescription Narcotics:  · Limit your activities  · Do not drive and operate hazardous machinery  · Do not make important personal or business decisions  · Do  not drink alcoholic beverages  · If you have not urinated within 8 hours after discharge, please contact your surgeon on call. * Please give a list of your current medications to your Primary Care Provider. * Please update this list whenever your medications are discontinued, doses are     changed, or new medications (including over-the-counter products) are added. * Please carry medication information at all times in case of emergency situations. These are general instructions for a healthy lifestyle:    No smoking/ No tobacco products/ Avoid exposure to second hand smoke  Surgeon General's Warning:  Quitting smoking now greatly reduces serious risk to your health.     Obesity, smoking, and sedentary lifestyle greatly increases your risk for illness  A healthy diet, regular physical exercise & weight monitoring are important for maintaining a healthy lifestyle    You may be retaining fluid if you have a history of heart failure or if you experience any of the following symptoms:  Weight gain of 3 pounds or more overnight or 5 pounds in a week, increased swelling in our hands or feet or shortness of breath while lying flat in bed. Please call your doctor as soon as you notice any of these symptoms; do not wait until your next office visit. Recognize signs and symptoms of STROKE:  F-face looks uneven    A-arms unable to move or move unevenly    S-speech slurred or non-existent    T-time-call 911 as soon as signs and symptoms begin-DO NOT go       Back to bed or wait to see if you get better-TIME IS BRAIN.

## 2021-08-23 NOTE — ANESTHESIA POSTPROCEDURE EVALUATION
Procedure(s):  PARTIAL LEFT THROMBECTOMY/EMBOLECTOMY UPPER EXTREMITY. general    Anesthesia Post Evaluation      Multimodal analgesia: multimodal analgesia not used between 6 hours prior to anesthesia start to PACU discharge  Patient location during evaluation: PACU  Patient participation: complete - patient participated  Level of consciousness: awake and alert  Pain management: adequate  Airway patency: patent  Anesthetic complications: no  Cardiovascular status: hemodynamically stable  Respiratory status: acceptable  Hydration status: acceptable        INITIAL Post-op Vital signs:   Vitals Value Taken Time   /64 08/23/21 1339   Temp 36.7 °C (98 °F) 08/23/21 1339   Pulse 53 08/23/21 1340   Resp 16 08/23/21 1339   SpO2 100 % 08/23/21 1340   Vitals shown include unvalidated device data.

## 2021-08-23 NOTE — PROGRESS NOTES
Patient status post attempted left upper arm declot. Patient left axillary stent, subclavian vein all occluded . Patient will need to have a tunnel catheter placed and will vein map her to look in her right arm. Patient be discharged when she leaves interventional radiology.     Veronica Khan

## 2021-08-23 NOTE — PROCEDURES
Department of Interventional Radiology  [phone]        Interventional Radiology Brief Procedure Note    Patient: Darvin Schulz MRN: [id number]  SSN: [id number]    YOB: 1950  Age: 65 y.o. Sex: female      Date of Procedure: 8/23/2021    Pre-Procedure Diagnosis: ESRD    Post-Procedure Diagnosis: SAME    Procedure(s): Tunneled Central Venous Catheter    Brief Description of Procedure: as above    Performed By: Mona Mckeon PA-C     Assistants: None    Anesthesia:Moderate Sedation per COLUMBA Iqbal MD    Estimated Blood Loss: Less than 10ml    Specimens:  None    Implants:  Tunnelled Hemodialysis Catheter    Findings: catheter tip in right atrium     Complications: None    Recommendations: ok to use catheter     Follow Up:  prn    Signed By: Mona Mckeon PA-C     August 23, 2021

## 2021-08-23 NOTE — PERIOP NOTES
TRANSFER - OUT REPORT:    Verbal report given to Merle Gudino RN on Lew Hedrick  being transferred to IR for ordered procedure       Report consisted of patients Situation, Background, Assessment and   Recommendations(SBAR). Information from the following report(s) SBAR, OR Summary, MAR and Cardiac Rhythm sb was reviewed with the receiving nurse. Lines:   Peripheral IV 08/23/21 Posterior;Right Hand (Active)   Site Assessment Clean, dry, & intact 08/23/21 1315   Phlebitis Assessment 0 08/23/21 1315   Infiltration Assessment 0 08/23/21 1315   Dressing Status Clean, dry, & intact 08/23/21 1315   Dressing Type Transparent;Tape 08/23/21 1315   Hub Color/Line Status Patent 08/23/21 1315   Alcohol Cap Used No 08/23/21 1315        Opportunity for questions and clarification was provided. Patient transported with:   O2 @ 3 liters  Registered Nurse    VTE prophylaxis orders have been written for eLw Hedrick. Patient and family given floor number and nurses name. Family updated re: pt status after security code verified.

## 2021-08-23 NOTE — OP NOTES
[facility]  OPERATIVE REPORT    Name:  Reynolds Jeans  MR#:  [id number]  :  1950  ACCOUNT #:  [de-identified]  DATE OF SERVICE:  2021    CLINICAL SERVICE:  Vascular Surgery    PREOPERATIVE DIAGNOSIS:  Occluded left brachiocephalic fistula. POSTOPERATIVE DIAGNOSIS:  Occluded left brachiocephalic fistula. PROCEDURE PERFORMED:  1. Open thromboembolectomy. 2.  Fistulogram.    SURGEON:  Mati Boykin. Jena Graves MD    ASSISTANT:      ANESTHESIA:  General.    COMPLICATIONS:  None. SPECIMENS REMOVED:  None. IMPLANTS:      ESTIMATED BLOOD LOSS:     PROCEDURE:  After getting informed consent, the patient was brought to the operating room. Anesthesia was then induced. Preop antibiotics were given before skin incision. The patient's left arm was then prepped and draped in a normal sterile fashion. Transverse incision was made at proximal end of the fistula. We dissected down through subcutaneous tissue where the fistula was incorporated. We gave the patient 5000 units of heparin. An 11-blade was then used to do transverse arteriotomy and confirmed that the graft was occluded. We manually pressured and removed a lot of thrombus from the outflow vein. We did not get any back bleed. Once all of that evacuated, we heparinized and flushed, however, we put a 8-[place] sheath and digital subtraction showed that mid portion of the cephalic vein was occluded. I gave additional 2000 units of heparin. We got a Glidewire. I was able to get a Kumpe catheter down into where the patient had a left axillary stent placement but we were not able to get a wire through. Digital subtraction showed that the stent in the distal subclavian vein and central veins were all occluded. This arm was nonsalvageable. I then removed the sheath and catheter and closed with 5-0 Prolene, 3-0 Vicryl, and 4-0 Monocryl. The patient was extubated and returned to the PACU in stable condition.       ARTURO De León, MD [email or web address]  D:  08/23/2021 13:11  T:  08/23/2021 16:00  JOB #:  [id number]

## 2021-08-23 NOTE — H&P
[address]   [facility] FAX: [address] [place]   1950    No chief complaint on file. HPI   Ms. Michael Cottrell is a 65y.o. year old female who presents with an occluded left brachiocephalic fistula. Current Outpatient Medications   Medication Sig Dispense Refill    oxyCODONE-acetaminophen (PERCOCET) 7.5-325 mg per tablet Take 1 Tab by mouth every six (6) hours as needed for Pain. Max Daily Amount: 4 Tabs. Indications: Pain 15 Tab 0    torsemide (DEMADEX) 100 mg tablet Take 100 mg by mouth daily.  cholecalciferol, VITAMIN D3, (VITAMIN D3) 5,000 unit tab tablet Take 5,000 Units by mouth daily.  metoprolol succinate (TOPROL-XL) 50 mg XL tablet Take  by mouth daily.  hydrALAZINE (APRESOLINE) 25 mg tablet Take 25 mg by mouth three (3) times daily.  lisinopril (PRINIVIL, ZESTRIL) 40 mg tablet Take 40 mg by mouth every morning.  aspirin delayed-release 81 mg tablet Take  by mouth daily.  Ferrous Fumarate 325 mg (106 mg iron) tab Take  by mouth two (2) times a day.  glipiZIDE (GLUCOTROL) 10 mg tablet Take 10 mg by mouth two (2) times a day.  amLODIPine (NORVASC) 10 mg tablet Take  by mouth daily.  SODIUM BICARBONATE PO Take  by mouth two (2) times a day. 10 GR tab      guaiFENesin-dextromethorphan (MUCUS RELIEF DM)  mg tab tablet Take 1 Tab by mouth as needed.  atorvastatin (LIPITOR) 80 mg tablet Take 80 mg by mouth nightly.        No Known Allergies  Past Medical History:   Diagnosis Date    Anemia     Arrhythmia     patient states she has a heart murmur, doesn't know kind    Chronic kidney disease     ESRD, patient doesn't dialize as of now 5/29/2018    Diabetes ([address]     typeobi, avgfbs 95, last A1C was 6.9 4/18/2018    Hypercholesterolemia     Hypertension     Morbid obesity (HCC)     Nausea & vomiting      Family History   Problem Relation Age of Onset    Stroke Mother    [facility] Hypertension Mother     Heart Disease Father         MI    Hypertension Father     Heart Disease Sister     Diabetes Brother     Heart Disease Brother     Heart Disease Brother      Past Surgical History:   Procedure Laterality Date    HX COLONOSCOPY      HX DILATION AND CURETTAGE      HX HYSTERECTOMY      VASCULAR SURGERY PROCEDURE UNLIST Left 05/30/2018    AVF creation     Social History     Tobacco Use    Smoking status: Never Smoker    Smokeless tobacco: Never Used   Substance Use Topics    Alcohol use: No        Review of Systems  Constitutional: Negative for fever and chills. HENT: Negative for congestion and sore throat. Skin: Negative for rash and itching. Eyes: Negative for blurred vision and double vision. Respiratory: Negative for cough and shortness of breath. Cardiovascular: Negative for chest pain, palpitations, claudication and leg swelling. Gastrointestinal: Negative for nausea, vomiting and abdominal pain. Genitourinary: Negative for dysuria, hematuria and flank pain. Musculoskeletal: Negative for joint pain and falls. Neurological: Negative for dizziness, sensory change, focal weakness, loss of consciousness and headaches. PHYSICAL EXAM   There were no vitals filed for this visit. Constitutional: she appears well-developed. No distress. HENT: Hearing intact. Head: Atraumatic. Eyes: Pupils are equal, round, and reactive to light. Neck: Normal range of motion. Cardiovascular: Regular rhythm. Pulmonary/Chest: Effort normal and breath sounds normal. No respiratory distress. Abdominal: Soft. Bowel sounds are normal. she exhibits no distension. There is no tenderness. There is no guarding. No hernia. Musculoskeletal: Normal range of motion. Neurological: She is alert. CN II- XII grossly intact  Skin: Warm and dry. Vascular: Occluded fistula      Imaging Results      ASSESSMENT AND PLAN   Ms. Bernie Guzman is a 65y.o. year old female end-stage renal disease with occluded left brachiocephalic fistula. We will schedule patient for open thromboembolectomy of fistulogram.    Elements of this note have been dictated using speech recognition software. As a result, errors of speech recognition may have occurred.

## 2021-08-23 NOTE — PROGRESS NOTES
Patient initial potassium 6.9. Recheck pending. Patient denies any chest pain no peak T waves on twelve-lead monitor. Order twelve-lead EKG and gave patient 2 g of calcium gluconate. I are discussed patient with Dr. Gail Ayala nephrologist, who if potassium still elevated over 6, will plan for temporary access placement today, dialysis after catheter and will plan for potential declot tomorrow.     Ian Artis

## 2021-08-23 NOTE — H&P
Department of Interventional Radiology  [phone]    History and Physical    Patient:  Darvin Schulz MRN:  [id number]  SSN:  [id number]    YOB: 1950  Age:  65 y.o. Sex:  female      Primary Care Provider:  Kiki Kahn MD  Referring Physician:  No ref. provider found    Subjective:     Chief Complaint: ESRD    History of the Present Illness: The patient is a 65 y.o. female with thrombosed AV access who presents for placement of tunneled HD catheter. NPO. Past Medical History:   Diagnosis Date    Anemia     Arrhythmia     patient states she has a heart murmur, doesn't know kind    Chronic kidney disease     ESRD, patient doesn't dialize as of now 5/29/2018    Diabetes ([address]     typell, avgfbs 95, last A1C was 6.9 4/18/2018    Hypercholesterolemia     Hypertension     Morbid obesity (HCC)     Nausea & vomiting      Past Surgical History:   Procedure Laterality Date    HX COLONOSCOPY      HX DILATION AND CURETTAGE      HX HYSTERECTOMY      VASCULAR SURGERY PROCEDURE UNLIST Left 05/30/2018    AVF creation        Review of Systems:    Pertinent items are noted in the History of Present Illness. Prior to Admission medications    Medication Sig Start Date End Date Taking? Authorizing Provider   torsemide (DEMADEX) 100 mg tablet Take 100 mg by mouth daily. Yes Provider, Historical   metoprolol succinate (TOPROL-XL) 50 mg XL tablet Take 12.5 mg by mouth daily. Yes Provider, Historical   aspirin delayed-release 81 mg tablet Take  by mouth daily. Yes Provider, Historical   SODIUM BICARBONATE PO Take  by mouth two (2) times a day. 10 GR tab   Yes Provider, Historical   oxyCODONE-acetaminophen (PERCOCET) 7.5-325 mg per tablet Take 1 Tab by mouth every six (6) hours as needed for Pain. Max Daily Amount: 4 Tabs. Indications: Pain 7/30/18   Annie Grissom MD   cholecalciferol, VITAMIN D3, (VITAMIN D3) 5,000 unit tab tablet Take 5,000 Units by mouth daily.   Patient not taking: Reported on 8/23/2021    Provider, Historical   hydrALAZINE (APRESOLINE) 25 mg tablet Take 25 mg by mouth three (3) times daily. Patient not taking: Reported on 8/23/2021    Provider, Historical   lisinopril (PRINIVIL, ZESTRIL) 40 mg tablet Take 40 mg by mouth every morning. Patient not taking: Reported on 8/23/2021    Provider, Historical   Ferrous Fumarate 325 mg (106 mg iron) tab Take  by mouth two (2) times a day. Provider, Historical   glipiZIDE (GLUCOTROL) 10 mg tablet Take 10 mg by mouth two (2) times a day. Patient not taking: Reported on 8/23/2021    Provider, Historical   amLODIPine (NORVASC) 10 mg tablet Take  by mouth daily. Patient not taking: Reported on 8/23/2021    Provider, Historical   guaiFENesin-dextromethorphan (MUCUS RELIEF DM)  mg tab tablet Take 1 Tab by mouth as needed. Patient not taking: Reported on 8/23/2021    Provider, Historical   atorvastatin (LIPITOR) 80 mg tablet Take 80 mg by mouth nightly.   Patient not taking: Reported on 8/23/2021    Provider, Historical        No Known Allergies    Family History   Problem Relation Age of Onset    Stroke Mother     Hypertension Mother     Heart Disease Father         MI    Hypertension Father     Heart Disease Sister     Diabetes Brother     Heart Disease Brother     Heart Disease Brother      Social History     Tobacco Use    Smoking status: Never Smoker    Smokeless tobacco: Never Used   Substance Use Topics    Alcohol use: No        Objective:       Physical Examination:    Vitals:    08/23/21 1329 08/23/21 1334 08/23/21 1339 08/23/21 1346   BP: (!) 159/67 139/64 138/64 (!) 152/70   Pulse: (!) 56 (!) 53 (!) 52 (!) 53   Resp: 18 16 16 16   Temp:   98 °F (36.7 °C) 97.3 °F (36.3 °C)   SpO2: 100% 100% 100% 100%   Weight:       Height:           Pain Assessment  Pain Intensity 1: 0 (08/23/21 1346)        Patient Stated Pain Goal: 0      HEART: regular rate and rhythm  LUNG: clear to auscultation bilaterally  ABDOMEN: normal findings: soft, non-tender  EXTREMITIES: warm, edema    Laboratory:     Lab Results   Component Value Date/Time    Sodium 138 08/23/2021 11:01 AM    Potassium 4.7 08/23/2021 11:01 AM    Potassium 3.1 (L) 07/25/2018 11:44 AM    Chloride 102 08/23/2021 11:01 AM    CO2 27 08/23/2021 11:01 AM    Anion gap 9 08/23/2021 11:01 AM    Glucose 162 (H) 08/23/2021 11:01 AM    BUN 64 (H) 08/23/2021 11:01 AM    Creatinine 10.40 (H) 08/23/2021 11:01 AM    GFR est AA 5 (L) 08/23/2021 11:01 AM    GFR est non-AA 4 (L) 08/23/2021 11:01 AM    Calcium 8.3 08/23/2021 11:01 AM     Lab Results   Component Value Date/Time    HGB 10.9 (L) 08/23/2021 10:56 AM    HGB 9.3 (L) 07/25/2018 11:44 AM     No results found for: APTT, PTP, INR, INREXT    Assessment:     ESRD, thrombosed AV access    Hospital Problems  Date Reviewed: 7/20/2018    None          Plan:     Planned Procedure: Tunneled hemodialysis catheter placement    Risks, benefits, and alternatives reviewed with patient and she agrees to proceed with the procedure.       Signed By: Lawson Garza PA-C     August 23, 2021

## 2021-08-23 NOTE — PROGRESS NOTES
TRANSFER - OUT REPORT:    Verbal report given to Aleisha Pinto (name) on Romel Mccormack  being transferred to IR recovery(unit) for routine progression of care       Report consisted of patients Situation, Background, Assessment and   Recommendations(SBAR). Information from the following report(s) SBAR, Procedure Summary and MAR was reviewed with the receiving nurse. Opportunity for questions and clarification was provided. Conscious Sedation:   75 Mcg of Fentanyl administered  1.5 Mg of Versed administered    Pt tolerated procedure well.      Visit Vitals  BP (!) 147/65   Pulse (!) 59   Temp 97.3 °F (36.3 °C)   Resp 16   Ht 5' 4\" (1.626 m)   Wt 86.9 kg (191 lb 9.6 oz)   SpO2 100%   BMI 32.89 kg/m²     Past Medical History:   Diagnosis Date    Anemia     Arrhythmia     patient states she has a heart murmur, doesn't know kind    Chronic kidney disease     ESRD, patient doesn't dialize as of now 5/29/2018    Diabetes [address] [address]     typell, avgfbs 95, last A1C was 6.9 4/18/2018    Hypercholesterolemia     Hypertension     Morbid obesity (HCC)     Nausea & vomiting      Peripheral IV 08/23/21 Posterior;Right Hand (Active)   Site Assessment Clean, dry, & intact 08/23/21 1339   Phlebitis Assessment 0 08/23/21 1339   Infiltration Assessment 0 08/23/21 1339   Dressing Status Clean, dry, & intact 08/23/21 1339   Dressing Type Transparent;Tape 08/23/21 1339   Hub Color/Line Status Patent 08/23/21 1339   Alcohol Cap Used No 08/23/21 1333

## 2021-08-23 NOTE — BRIEF OP NOTE
[address]   [address] [id number]  [phone] FAX: [phone]    Brief Op Note Template Note    Pre-Op Diagnosis: Left AV fistula Clot    Post-Op Diagnosis:  Left AV fistula Clot    Procedures: Procedure(s):  PARTIAL LEFT THROMBECTOMY/EMBOLECTOMY UPPER EXTREMITY    Surgeon: Maulik Zuniga MD    Assistants: Surgeon(s): Jose Benavides MD      Anesthesia:  General     Findings: central occlusion    Tourniquet Time:  * No tourniquets in log *    Estimated Blood Loss:               Specimens:          Implants:  * No implants in log *    Complications: None               Signed: Maulik Zuniga MD      Elements of this note have been dictated using speech recognition software. As a result, errors of speech recognition may have occurred.

## 2021-08-23 NOTE — PERIOP NOTES
Debrief completed Yes     Correct procedure Yes     Count completed and verified Yes    Specimen collected and verified NA    Wound classification Clean     Other NA

## 2021-11-11 ENCOUNTER — HOSPITAL ENCOUNTER (OUTPATIENT)
Dept: SURGERY | Age: 71
Discharge: HOME | End: 2021-11-11
Attending: SURGERY

## 2021-11-11 VITALS
WEIGHT: 191.5 LBS | BODY MASS INDEX: 32.69 KG/M2 | TEMPERATURE: 97.5 F | DIASTOLIC BLOOD PRESSURE: 65 MMHG | OXYGEN SATURATION: 97 % | HEART RATE: 57 BPM | RESPIRATION RATE: 16 BRPM | SYSTOLIC BLOOD PRESSURE: 170 MMHG | HEIGHT: 64 IN

## 2021-11-11 RX ORDER — SACUBITRIL AND VALSARTAN 24; 26 MG/1; MG/1
1 TABLET, FILM COATED ORAL 2 TIMES DAILY
COMMUNITY
End: 2022-03-24

## 2021-11-11 NOTE — PERIOP NOTES
Patient verified name and     Order for consent not found in EHR and matches case posting; patient verified. Type lB surgery, walk in assessment complete. Labs per surgeon: none at present time;   Labs per anesthesia protocol: POC hgb, Potassium and glucose;   EKG: Echo in care everywhere from 10/12/2021 EF 55-60%. Patient sees [address]. Denies any chest pain or CAMILO at present time    Patient COVID test date 11/15/2021; Hospital approved surgical skin cleanser and instructions given per hospital policy. Patient provided with and instructed on educational handouts including Guide to Surgery, Pain Management, Hand Hygiene, Blood Transfusion Education, and [place] [facility] Brochure. Patient answered medical/surgical history questions at their best of ability. All prior to admission medications documented in [facility]. Original medication prescription bottle was visualized during patient appointment. Patient teach back successful and patient demonstrates knowledge of instructions. PLEASE CONTINUE TAKING ALL PRESCRIPTION MEDICATIONS UP TO THE DAY OF SURGERY UNLESS OTHERWISE DIRECTED BELOW. DISCONTINUE all vitamins and supplements 7 days prior to surgery. DISCONTINUE Non-Steriodal Anti-Inflammatory (NSAIDS) such as Advil and Aleve 5 days prior to surgery. Home Medications to take  the day of surgery      ENTRESTO     METOPROLOL     ASPIRIN     Home Medications   to Hold   HOLD VITAMIN D UNTIL AFTER SURGERY   HOLD DEMADEX MORNING OF SURGERY    HOLD FERROUS FURMATE(IRON TABLET) MORNING OF SURGERY    IF NPH INSULIN NEEDED THE NIGHT PRIOR TO SURGERY TAKE 16 UNITS ONLY     Comments    Covid test 11/15/2021 @ 2 2 [facility] [facility], [place]    On the day before surgery please take Acetaminophen 1000mg in the morning and then again before bed. .           Please do not bring home medications with you on the day of surgery unless otherwise directed by your nurse.   If you are instructed to bring home medications, please give them to your nurse as they will be administered by the nursing staff. If you have any questions, please call [address] [place] [address] [phone] or [address] [place] [address] [phone]. A copy of this note was provided to the patient for reference.

## 2021-11-30 ENCOUNTER — ANESTHESIA EVENT (OUTPATIENT)
Dept: SURGERY | Age: 71
End: 2021-11-30
Payer: MEDICARE

## 2021-12-01 ENCOUNTER — HOSPITAL ENCOUNTER (OUTPATIENT)
Age: 71
Setting detail: OUTPATIENT SURGERY
Discharge: HOME | End: 2021-12-01
Attending: SURGERY | Admitting: SURGERY
Payer: MEDICARE

## 2021-12-01 ENCOUNTER — ANESTHESIA (OUTPATIENT)
Dept: SURGERY | Age: 71
End: 2021-12-01
Payer: MEDICARE

## 2021-12-01 VITALS
SYSTOLIC BLOOD PRESSURE: 139 MMHG | HEIGHT: 64 IN | DIASTOLIC BLOOD PRESSURE: 65 MMHG | BODY MASS INDEX: 32.64 KG/M2 | HEART RATE: 57 BPM | WEIGHT: 191.2 LBS | RESPIRATION RATE: 15 BRPM | OXYGEN SATURATION: 98 % | TEMPERATURE: 97.5 F

## 2021-12-01 DIAGNOSIS — N18.6 ESRD (END STAGE RENAL DISEASE) (HCC): Primary | ICD-10-CM

## 2021-12-01 LAB
COVID-19 RAPID TEST, COVR: NOT DETECTED
GLUCOSE BLD STRIP.AUTO-MCNC: 128 MG/DL (ref 65–100)
GLUCOSE BLD STRIP.AUTO-MCNC: 63 MG/DL (ref 65–100)
GLUCOSE BLD STRIP.AUTO-MCNC: 73 MG/DL (ref 65–100)
GLUCOSE BLD STRIP.AUTO-MCNC: 89 MG/DL (ref 65–100)
HGB BLD-MCNC: 12.4 G/DL (ref 11.7–15.4)
POTASSIUM BLD-SCNC: 4.6 MMOL/L (ref 3.5–5.1)
SERVICE CMNT-IMP: ABNORMAL
SERVICE CMNT-IMP: ABNORMAL
SERVICE CMNT-IMP: NORMAL
SERVICE CMNT-IMP: NORMAL
SOURCE, COVRS: NORMAL

## 2021-12-01 PROCEDURE — 77030031139 HC SUT VCRL2 J&J -A: Performed by: SURGERY

## 2021-12-01 PROCEDURE — 74011250637 HC RX REV CODE- 250/637: Performed by: ANESTHESIOLOGY

## 2021-12-01 PROCEDURE — 74011250636 HC RX REV CODE- 250/636: Performed by: SURGERY

## 2021-12-01 PROCEDURE — 74011000250 HC RX REV CODE- 250: Performed by: ANESTHESIOLOGY

## 2021-12-01 PROCEDURE — 77030040361 HC SLV COMPR DVT MDII -B: Performed by: SURGERY

## 2021-12-01 PROCEDURE — 74011250636 HC RX REV CODE- 250/636: Performed by: ANESTHESIOLOGY

## 2021-12-01 PROCEDURE — 84132 ASSAY OF SERUM POTASSIUM: CPT

## 2021-12-01 PROCEDURE — 76010000171 HC OR TIME 2 TO 2.5 HR INTENSV-TIER 1: Performed by: SURGERY

## 2021-12-01 PROCEDURE — 36830 ARTERY-VEIN NONAUTOGRAFT: CPT | Performed by: SURGERY

## 2021-12-01 PROCEDURE — 76210000006 HC OR PH I REC 0.5 TO 1 HR: Performed by: SURGERY

## 2021-12-01 PROCEDURE — 76210000020 HC REC RM PH II FIRST 0.5 HR: Performed by: SURGERY

## 2021-12-01 PROCEDURE — 2709999900 HC NON-CHARGEABLE SUPPLY: Performed by: SURGERY

## 2021-12-01 PROCEDURE — 77030002933 HC SUT MCRYL J&J -A: Performed by: SURGERY

## 2021-12-01 PROCEDURE — 85018 HEMOGLOBIN: CPT

## 2021-12-01 PROCEDURE — 77030039425 HC BLD LARYNG TRULITE DISP TELE -A: Performed by: ANESTHESIOLOGY

## 2021-12-01 PROCEDURE — 82962 GLUCOSE BLOOD TEST: CPT

## 2021-12-01 PROCEDURE — 77030037088 HC TUBE ENDOTRACH ORAL NSL COVD-A: Performed by: ANESTHESIOLOGY

## 2021-12-01 PROCEDURE — 77030010512 HC APPL CLP LIG J&J -C: Performed by: SURGERY

## 2021-12-01 PROCEDURE — 77030040922 HC BLNKT HYPOTHRM STRY -A: Performed by: ANESTHESIOLOGY

## 2021-12-01 PROCEDURE — C1768 GRAFT, VASCULAR: HCPCS | Performed by: SURGERY

## 2021-12-01 PROCEDURE — 77030034888 HC SUT PROL 2 J&J -B: Performed by: SURGERY

## 2021-12-01 PROCEDURE — 87635 SARS-COV-2 COVID-19 AMP PRB: CPT

## 2021-12-01 PROCEDURE — 77030019908 HC STETH ESOPH SIMS -A: Performed by: ANESTHESIOLOGY

## 2021-12-01 PROCEDURE — 77030002996 HC SUT SLK J&J -A: Performed by: SURGERY

## 2021-12-01 PROCEDURE — 76060000035 HC ANESTHESIA 2 TO 2.5 HR: Performed by: SURGERY

## 2021-12-01 DEVICE — GRAFT VASC L40CM ID6MM EPTFE STD WALLED NONRINGED STR GOR: Type: IMPLANTABLE DEVICE | Site: ARM | Status: FUNCTIONAL

## 2021-12-01 RX ORDER — OXYCODONE HYDROCHLORIDE 5 MG/1
10 TABLET ORAL
Status: DISCONTINUED | OUTPATIENT
Start: 2021-12-01 | End: 2021-12-01 | Stop reason: HOSPADM

## 2021-12-01 RX ORDER — NEOSTIGMINE METHYLSULFATE 1 MG/ML
INJECTION, SOLUTION INTRAVENOUS AS NEEDED
Status: DISCONTINUED | OUTPATIENT
Start: 2021-12-01 | End: 2021-12-01 | Stop reason: HOSPADM

## 2021-12-01 RX ORDER — CEFAZOLIN SODIUM/WATER 2 G/20 ML
2 SYRINGE (ML) INTRAVENOUS ONCE
Status: COMPLETED | OUTPATIENT
Start: 2021-12-01 | End: 2021-12-01

## 2021-12-01 RX ORDER — LIDOCAINE HYDROCHLORIDE 10 MG/ML
0.1 INJECTION INFILTRATION; PERINEURAL AS NEEDED
Status: DISCONTINUED | OUTPATIENT
Start: 2021-12-01 | End: 2021-12-01 | Stop reason: HOSPADM

## 2021-12-01 RX ORDER — FENTANYL CITRATE 50 UG/ML
100 INJECTION, SOLUTION INTRAMUSCULAR; INTRAVENOUS ONCE
Status: DISCONTINUED | OUTPATIENT
Start: 2021-12-01 | End: 2021-12-01 | Stop reason: HOSPADM

## 2021-12-01 RX ORDER — DEXTROSE 50 % IN WATER (D50W) INTRAVENOUS SYRINGE
AS NEEDED
Status: DISCONTINUED | OUTPATIENT
Start: 2021-12-01 | End: 2021-12-01 | Stop reason: HOSPADM

## 2021-12-01 RX ORDER — PROPOFOL 10 MG/ML
INJECTION, EMULSION INTRAVENOUS AS NEEDED
Status: DISCONTINUED | OUTPATIENT
Start: 2021-12-01 | End: 2021-12-01 | Stop reason: HOSPADM

## 2021-12-01 RX ORDER — HEPARIN SODIUM 1000 [USP'U]/ML
INJECTION, SOLUTION INTRAVENOUS; SUBCUTANEOUS AS NEEDED
Status: DISCONTINUED | OUTPATIENT
Start: 2021-12-01 | End: 2021-12-01 | Stop reason: HOSPADM

## 2021-12-01 RX ORDER — HYDROMORPHONE HYDROCHLORIDE 2 MG/ML
0.5 INJECTION, SOLUTION INTRAMUSCULAR; INTRAVENOUS; SUBCUTANEOUS
Status: DISCONTINUED | OUTPATIENT
Start: 2021-12-01 | End: 2021-12-01 | Stop reason: HOSPADM

## 2021-12-01 RX ORDER — LIDOCAINE HYDROCHLORIDE 20 MG/ML
INJECTION, SOLUTION EPIDURAL; INFILTRATION; INTRACAUDAL; PERINEURAL AS NEEDED
Status: DISCONTINUED | OUTPATIENT
Start: 2021-12-01 | End: 2021-12-01 | Stop reason: HOSPADM

## 2021-12-01 RX ORDER — EPHEDRINE SULFATE/0.9% NACL/PF 50 MG/5 ML
SYRINGE (ML) INTRAVENOUS AS NEEDED
Status: DISCONTINUED | OUTPATIENT
Start: 2021-12-01 | End: 2021-12-01 | Stop reason: HOSPADM

## 2021-12-01 RX ORDER — DEXTROSE 50 % IN WATER (D50W) INTRAVENOUS SYRINGE
Status: COMPLETED
Start: 2021-12-01 | End: 2021-12-01

## 2021-12-01 RX ORDER — HEPARIN SODIUM 5000 [USP'U]/ML
INJECTION, SOLUTION INTRAVENOUS; SUBCUTANEOUS AS NEEDED
Status: DISCONTINUED | OUTPATIENT
Start: 2021-12-01 | End: 2021-12-01 | Stop reason: HOSPADM

## 2021-12-01 RX ORDER — MIDAZOLAM HYDROCHLORIDE 1 MG/ML
2 INJECTION, SOLUTION INTRAMUSCULAR; INTRAVENOUS ONCE
Status: DISCONTINUED | OUTPATIENT
Start: 2021-12-01 | End: 2021-12-01 | Stop reason: HOSPADM

## 2021-12-01 RX ORDER — ONDANSETRON 2 MG/ML
INJECTION INTRAMUSCULAR; INTRAVENOUS AS NEEDED
Status: DISCONTINUED | OUTPATIENT
Start: 2021-12-01 | End: 2021-12-01 | Stop reason: HOSPADM

## 2021-12-01 RX ORDER — FENTANYL CITRATE 50 UG/ML
INJECTION, SOLUTION INTRAMUSCULAR; INTRAVENOUS AS NEEDED
Status: DISCONTINUED | OUTPATIENT
Start: 2021-12-01 | End: 2021-12-01 | Stop reason: HOSPADM

## 2021-12-01 RX ORDER — SODIUM CHLORIDE, SODIUM LACTATE, POTASSIUM CHLORIDE, CALCIUM CHLORIDE 600; 310; 30; 20 MG/100ML; MG/100ML; MG/100ML; MG/100ML
75 INJECTION, SOLUTION INTRAVENOUS CONTINUOUS
Status: DISCONTINUED | OUTPATIENT
Start: 2021-12-01 | End: 2021-12-01 | Stop reason: HOSPADM

## 2021-12-01 RX ORDER — HYDROCODONE BITARTRATE AND ACETAMINOPHEN 5; 325 MG/1; MG/1
1 TABLET ORAL
Qty: 12 TABLET | Refills: 0 | Status: SHIPPED | OUTPATIENT
Start: 2021-12-01 | End: 2021-12-04

## 2021-12-01 RX ORDER — ROCURONIUM BROMIDE 10 MG/ML
INJECTION, SOLUTION INTRAVENOUS AS NEEDED
Status: DISCONTINUED | OUTPATIENT
Start: 2021-12-01 | End: 2021-12-01 | Stop reason: HOSPADM

## 2021-12-01 RX ORDER — GLYCOPYRROLATE 0.2 MG/ML
INJECTION INTRAMUSCULAR; INTRAVENOUS AS NEEDED
Status: DISCONTINUED | OUTPATIENT
Start: 2021-12-01 | End: 2021-12-01 | Stop reason: HOSPADM

## 2021-12-01 RX ORDER — SODIUM CHLORIDE 9 MG/ML
25 INJECTION, SOLUTION INTRAVENOUS CONTINUOUS
Status: DISCONTINUED | OUTPATIENT
Start: 2021-12-01 | End: 2021-12-01 | Stop reason: HOSPADM

## 2021-12-01 RX ORDER — OXYCODONE HYDROCHLORIDE 5 MG/1
5 TABLET ORAL
Status: DISCONTINUED | OUTPATIENT
Start: 2021-12-01 | End: 2021-12-01 | Stop reason: HOSPADM

## 2021-12-01 RX ORDER — FAMOTIDINE 20 MG/1
20 TABLET, FILM COATED ORAL ONCE
Status: COMPLETED | OUTPATIENT
Start: 2021-12-01 | End: 2021-12-01

## 2021-12-01 RX ADMIN — Medication 10 MG: at 13:48

## 2021-12-01 RX ADMIN — Medication 10 MG: at 14:18

## 2021-12-01 RX ADMIN — GLYCOPYRROLATE 0.4 MG: 0.2 INJECTION, SOLUTION INTRAMUSCULAR; INTRAVENOUS at 14:52

## 2021-12-01 RX ADMIN — Medication 10 MG: at 14:10

## 2021-12-01 RX ADMIN — Medication 10 MG: at 14:16

## 2021-12-01 RX ADMIN — ONDANSETRON 4 MG: 2 INJECTION INTRAMUSCULAR; INTRAVENOUS at 14:37

## 2021-12-01 RX ADMIN — FAMOTIDINE 20 MG: 20 TABLET ORAL at 12:01

## 2021-12-01 RX ADMIN — Medication 10 MG: at 14:13

## 2021-12-01 RX ADMIN — PROPOFOL 100 MG: 10 INJECTION, EMULSION INTRAVENOUS at 13:10

## 2021-12-01 RX ADMIN — DEXTROSE 12.5 G: 50 INJECTION, SOLUTION INTRAVENOUS at 13:02

## 2021-12-01 RX ADMIN — LIDOCAINE HYDROCHLORIDE 100 MG: 20 INJECTION, SOLUTION EPIDURAL; INFILTRATION; INTRACAUDAL; PERINEURAL at 13:10

## 2021-12-01 RX ADMIN — Medication 3 MG: at 14:52

## 2021-12-01 RX ADMIN — Medication 10 MG: at 13:46

## 2021-12-01 RX ADMIN — DEXTROSE 6.25 G: 50 INJECTION, SOLUTION INTRAVENOUS at 14:58

## 2021-12-01 RX ADMIN — GLYCOPYRROLATE 0.2 MG: 0.2 INJECTION, SOLUTION INTRAMUSCULAR; INTRAVENOUS at 13:02

## 2021-12-01 RX ADMIN — FENTANYL CITRATE 50 MCG: 50 INJECTION INTRAMUSCULAR; INTRAVENOUS at 13:10

## 2021-12-01 RX ADMIN — CEFAZOLIN 2 G: 1 INJECTION, POWDER, FOR SOLUTION INTRAVENOUS at 13:22

## 2021-12-01 RX ADMIN — HEPARIN SODIUM 6000 UNITS: 1000 INJECTION, SOLUTION INTRAVENOUS; SUBCUTANEOUS at 13:50

## 2021-12-01 RX ADMIN — ROCURONIUM BROMIDE 50 MG: 10 INJECTION, SOLUTION INTRAVENOUS at 13:10

## 2021-12-01 RX ADMIN — SODIUM CHLORIDE 25 ML/HR: 900 INJECTION, SOLUTION INTRAVENOUS at 12:01

## 2021-12-01 NOTE — PROGRESS NOTES
Patient status post right upper arm graft placement. Patient will keep postop dressing intact overnight can remove tomorrow. Pain medication sent to pharmacy. Patient will continue to use her tunnel catheter until follow-up in the office in 2 weeks.     Olman Monae

## 2021-12-01 NOTE — ANESTHESIA POSTPROCEDURE EVALUATION
Procedure(s):  RIGHT BRACHIAL UPPER AXILLARY  LOOP  GRAFT INSERTION. general    Anesthesia Post Evaluation      Multimodal analgesia: multimodal analgesia not used between 6 hours prior to anesthesia start to PACU discharge  Patient location during evaluation: PACU  Patient participation: complete - patient participated  Level of consciousness: awake and alert  Pain management: adequate  Airway patency: patent  Anesthetic complications: no  Cardiovascular status: hemodynamically stable  Respiratory status: acceptable  Hydration status: acceptable        INITIAL Post-op Vital signs:   Vitals Value Taken Time   /65 12/01/21 1545   Temp 36.4 °C (97.5 °F) 12/01/21 1540   Pulse 57 12/01/21 1551   Resp 15 12/01/21 1540   SpO2 96 % 12/01/21 1551   Vitals shown include unvalidated device data.

## 2021-12-01 NOTE — PROGRESS NOTES
Spiritual Care visit. Initial Visit, Pre Surgery Consult. Visit and prayer before patient goes to surgery.     Visit by Gurwinder Amador M.Ed., Th.B. ,Staff

## 2021-12-01 NOTE — DISCHARGE INSTRUCTIONS
INSTRUCTIONS FOR A-V FISTULA, GRAFT ACCESS, REVISION OR DECLOT    ACTIVITY  · As tolerated and as directed by your doctor. · Keep arm straight and raised above heart level for the next 24 hours. DIET  · Clear liquids until no nausea or vomiting; then light diet for the first day. · Advance to regular diet on second day, unless your doctor orders otherwise. · If nausea and vomiting continues, call your doctor. PAIN  · Take pain medication as directed by your doctor. · Call your doctor if pain is NOT relieved by the medication. · DO NOT take aspirin or blood thinners until directed by your doctor. MEDICATION INTERACTION:  During your procedure you potentially received a medication or medications which may reduce the effectiveness of oral contraceptives. Please consider other forms of contraception for 1 month following your procedure if you are currently using oral contraceptives as your primary form of birth control. In addition to this, we recommend continuing your oral contraceptive as prescribed, unless otherwise instructed by your physician, during this time    DRESSING CARE  · Keep your dressing clean and dry  · Leave the dressing on until your doctor tells you to remove it, or your dialysis nurse takes it off if you have an upcoming dialysis appointment. · If you have an ace wrap on, please remove it tomorrow 12/2/21    CARE OF YOUR ACCESS  DO:  · Keep access area clean with soap and water daily after dressing has been removed. · Feel for the thrill daily. (by placing your fingers over the graft you will be able to feel a vibration (thrill) which means blood is flowing and the graft is working.)  DO NOT:  · Wear tight sleeves, watches, belts or bracelets over graft. · Carry heavy bags across the graft. · Sleep on graft side. · Let your blood pressure be taken on graft side. · Let blood be drawn from your graft side.     CALL YOUR DOCTOR IF  · Excessive bleeding that does not stop after holding mild pressure over area. · Temperature of 101 degrees F or above. · Redness, excessive swelling or bruising, and/or green or yellow, smelly discharge from incision   · Loss of sensation-cold, white, or blue fingers or toes. After general anesthesia or intravenous sedation, for 24 hours or while taking prescription Narcotics:  · Limit your activities  · A responsible adult needs to be with you for the next 24 hours  · Do not drive and operate hazardous machinery  · Do not make important personal or business decisions  · Do not drink alcoholic beverages  · If you have not urinated within 8 hours after discharge, and you are experiencing discomfort from urinary retention, please go to the nearest ED. · If you have sleep apnea and have a CPAP machine, please use it for all naps and sleeping. · Please use caution when taking narcotics and any of your home medications that may cause drowsiness. *  Please give a list of your current medications to your Primary Care Provider. *  Please update this list whenever your medications are discontinued, doses are      changed, or new medications (including over-the-counter products) are added. *  Please carry medication information at all times in case of emergency situations. These are general instructions for a healthy lifestyle:  No smoking/ No tobacco products/ Avoid exposure to second hand smoke  Surgeon General's Warning:  Quitting smoking now greatly reduces serious risk to your health. Obesity, smoking, and sedentary lifestyle greatly increases your risk for illness  A healthy diet, regular physical exercise & weight monitoring are important for maintaining a healthy lifestyle    You may be retaining fluid if you have a history of heart failure or if you experience any of the following symptoms:  Weight gain of 3 pounds or more overnight or 5 pounds in a week, increased swelling in our hands or feet or shortness of breath while lying flat in bed. Please call your doctor as soon as you notice any of these symptoms; do not wait until your next office visit.

## 2021-12-01 NOTE — BRIEF OP NOTE
[address]   [address] [address]  [phone] FAX: [phone]    Brief Op Note Template Note    Pre-Op Diagnosis: End stage renal disease ([facility] [address] [N18.6]    Post-Op Diagnosis:  End stage renal disease ([facility] [address] [N18.6]    Procedures: Procedure(s):  RIGHT BRACHIAL UPPER AXILLARY  LOOP  GRAFT INSERTION    Surgeon: Frieda Boswell MD    Assistants: Surgeon(s): Ave Mcclure MD      Anesthesia:  General     Findings: Right upper arm graft placement    Tourniquet Time:  * No tourniquets in log *    Estimated Blood Loss:               Specimens:            Implants:    Implant Name Type Inv. Item Serial No.  Lot No. LRB No. Used Action   GRAFT VASC L40CM ID6MM EPTFE STD WALLED NONRINGED STR GOR - [id number]  GRAFT VASC L40CM ID6MM EPTFE STD WALLED NONRINGED STR GOR [id number]  [employer]  Right 1 Implanted       Complications: None               Signed: Frieda Boswell MD      Elements of this note have been dictated using speech recognition software. As a result, errors of speech recognition may have occurred.

## 2021-12-02 NOTE — OP NOTES
[facility]  OPERATIVE REPORT    Name:  Tammie Livingston  MR#:  [id number]  :  1950  ACCOUNT #:  [de-identified]  DATE OF SERVICE:  2021    CLINICAL SERVICE:  Vascular Surgery    PREOPERATIVE DIAGNOSIS:  End-stage renal disease needing long-term dialysis access. POSTOPERATIVE DIAGNOSIS:  End-stage renal disease needing long-term dialysis access. PROCEDURE PERFORMED:  Creation of right upper arm brachial axillary loop graft. SURGEON:  Teresa Acuna MD    ASSISTANT:  None. ANESTHESIA:  General.    COMPLICATIONS:  None. SPECIMENS REMOVED:  None. IMPLANTS:     ESTIMATED BLOOD LOSS:      PROCEDURE:  After getting informed consent, the patient was brought to the operating room. Anesthesia was then induced. Preop antibiotics were given before skin incision. The patient's right arm was then prepped and draped in normal sterile fashion. Incision was made just above the antecubital fossa. We dissected down through the subcutaneous tissue where the brachial artery was identified underneath the fascia. It was measured about 4-5 mm. It was soft. We got control with vessel loops. We made a counterincision in the upper axillary area. We dissected down through the fascia where we identified the axillary vein. It was measured about 6-7 mm. We got control with vessel loops. All side branches were ligated with 2-0 silk ties. We then tunneled connecting two incisions with a Trista-Wick tunneler and 6 x 50 PTFE graft. We heparinized the patient with 6000 units of heparin. We got control of the brachial artery with vessel loops. An 11-blade was used to do arteriotomy with extended Fontaien scissors. We spatulated the graft and did end-to-side anastomosis using 6-0 Prolene. Postprocedure there was good pulsatile flow to the graft. An 11-blade was used to do venotomy. We extended with Fontaine scissors.   Then, we spatulated the graft and did end-to-side anastomosis using 6-0 Prolene. Postprocedure he had good Doppler thrill in the graft. We then held pressure. We closed the wound with 2-0 Vicryl, 3-0 Vicryl and 4-0 Monocryl. The patient extubated and returned to the PACU in stable condition.         Rogelio Marie MD      [id number]  D:  12/01/2021 15:10  T:  12/01/2021 20:38  JOB #:  [id number]

## 2022-02-03 ENCOUNTER — HOSPITAL ENCOUNTER (OUTPATIENT)
Dept: LAB | Age: 72
Discharge: HOME | End: 2022-02-03

## 2022-02-03 LAB
BASOPHILS # BLD: 0 K/UL (ref 0–0.2)
BASOPHILS NFR BLD: 0 % (ref 0–2)
DIFFERENTIAL METHOD BLD: ABNORMAL
EOSINOPHIL # BLD: 0.2 K/UL (ref 0–0.8)
EOSINOPHIL NFR BLD: 2 % (ref 0.5–7.8)
ERYTHROCYTE [DISTWIDTH] IN BLOOD BY AUTOMATED COUNT: 16.1 % (ref 11.9–14.6)
HCT VFR BLD AUTO: 21.7 % (ref 35.8–46.3)
HGB BLD-MCNC: 6.7 G/DL (ref 11.7–15.4)
IMM GRANULOCYTES # BLD AUTO: 0.1 K/UL (ref 0–0.5)
IMM GRANULOCYTES NFR BLD AUTO: 1 % (ref 0–5)
LYMPHOCYTES # BLD: 0.5 K/UL (ref 0.5–4.6)
LYMPHOCYTES NFR BLD: 5 % (ref 13–44)
MCH RBC QN AUTO: 27.8 PG (ref 26.1–32.9)
MCHC RBC AUTO-ENTMCNC: 30.9 G/DL (ref 31.4–35)
MCV RBC AUTO: 90 FL (ref 79.6–97.8)
MONOCYTES # BLD: 0.6 K/UL (ref 0.1–1.3)
MONOCYTES NFR BLD: 6 % (ref 4–12)
NEUTS SEG # BLD: 8.3 K/UL (ref 1.7–8.2)
NEUTS SEG NFR BLD: 86 % (ref 43–78)
NRBC # BLD: 0 K/UL (ref 0–0.2)
PLATELET # BLD AUTO: 264 K/UL (ref 150–450)
PMV BLD AUTO: 11 FL (ref 9.4–12.3)
RBC # BLD AUTO: 2.41 M/UL (ref 4.05–5.2)
WBC # BLD AUTO: 9.6 K/UL (ref 4.3–11.1)

## 2022-02-03 PROCEDURE — 85025 COMPLETE CBC W/AUTO DIFF WBC: CPT

## 2022-03-03 ENCOUNTER — HOSPITAL ENCOUNTER (OUTPATIENT)
Dept: WOUND CARE | Age: 72
Discharge: HOME | End: 2022-03-03
Attending: SURGERY
Payer: COMMERCIAL

## 2022-03-03 VITALS
RESPIRATION RATE: 18 BRPM | HEART RATE: 97 BPM | OXYGEN SATURATION: 97 % | DIASTOLIC BLOOD PRESSURE: 83 MMHG | TEMPERATURE: 97.8 F | BODY MASS INDEX: 32.61 KG/M2 | SYSTOLIC BLOOD PRESSURE: 145 MMHG | HEIGHT: 64 IN

## 2022-03-03 DIAGNOSIS — E66.01 OBESITY, MORBID (HCC): ICD-10-CM

## 2022-03-03 DIAGNOSIS — R15.9 INCONTINENCE OF FECES, UNSPECIFIED FECAL INCONTINENCE TYPE: ICD-10-CM

## 2022-03-03 DIAGNOSIS — L98.429 STAGE 4 SKIN ULCER OF SACRAL REGION (HCC): ICD-10-CM

## 2022-03-03 PROBLEM — D64.89 OTHER SPECIFIED ANEMIAS: Status: ACTIVE | Noted: 2022-03-03

## 2022-03-03 PROBLEM — A04.72 C. DIFFICILE COLITIS: Status: ACTIVE | Noted: 2022-03-03

## 2022-03-03 PROCEDURE — 97597 DBRDMT OPN WND 1ST 20 CM/<: CPT

## 2022-03-03 PROCEDURE — 99204 OFFICE O/P NEW MOD 45 MIN: CPT | Performed by: SURGERY

## 2022-03-03 PROCEDURE — 99213 OFFICE O/P EST LOW 20 MIN: CPT

## 2022-03-03 RX ORDER — TRAMADOL HYDROCHLORIDE 50 MG/1
50 TABLET ORAL
COMMUNITY

## 2022-03-03 RX ORDER — INSULIN LISPRO 100 [IU]/ML
100 INJECTION, SOLUTION INTRAVENOUS; SUBCUTANEOUS
COMMUNITY

## 2022-03-03 NOTE — DISCHARGE INSTRUCTIONS
Sacral Wound:  Cleanse wound with normal saline. Apply saline wet-to-dry gauze to wound bed. Cover with ABD pads and paper tape. Change 2 times a day and whenever soiled.    -Wound debrided by Dr. Dave Yo at wound center 3/3/22    -Lay side-to-side. -REMOVE RECTAL TUBE 3/3/22 PER DR. Tammie Phan.

## 2022-03-03 NOTE — WOUND CARE
[address]  [address], [address]  Phone: [phone]  Fax: [phone]    Patient: Paul Bauer MRN: [id number]  SSN: [id number]    YOB: 1950  Age: 66 y.o. Sex: female       Return Appointment: 2 weeks with Rigo Barber MD    Instructions:   Sacral Wound:  Cleanse wound with normal saline. Apply saline wet-to-dry gauze to wound bed. Cover with ABD pads and paper tape. Change 2 times a day and whenever soiled.    -Wound debrided by Dr. Jose Connor at wound center 3/3/22    -Lay side-to-side. -PLEASE TEST STOOL FOR C-DIFF ONCE RETURN TO [facility] RE[facility] ON  3/3/22, REMOVE RECTAL TUBE 3/3/22 PER DR. Kathy Ayala NOT GOOD TO HAVE TUBE IN FOR LONGER THAN A MONTH. Should you experience increased redness, swelling, pain, foul odor, size of wound(s), or have a temperature over 101 degrees please contact the [address] [place] [address] Road at [phone] or if after hours contact your primary care physician or go to the hospital emergency department.     Signed By: La Nena Portillo RN     March 3, 2022

## 2022-03-03 NOTE — WOUND CARE
03/03/22 0846   Wound Sacrum   Date First Assessed/Time First Assessed: 03/03/22 0838   Present on Hospital Admission: Yes  Wound Approximate Age at First Assessment (Weeks): 10 weeks  Primary Wound Type: Pressure Injury  Location: Sacrum   Wound Image    Wound Etiology Pressure Stage 4   Dressing Status Old drainage noted   Cleansed Cleansed with saline   Dressing/Treatment   (dakin's, abd, tape)   Wound Length (cm) 12.5 cm   Wound Width (cm) 11.5 cm   Wound Depth (cm) 7.5 cm   Wound Surface Area (cm^2) 143.75 cm^2   Wound Volume (cm^3) 1992.483 cm^3   Wound Assessment Granulation tissue;Slough   Drainage Amount Large   Drainage Description Serosanguinous   Wound Odor Mild   Wound Thickness Description Full thickness

## 2022-03-03 NOTE — PROGRESS NOTES
[address]  Consult Note/H&P/Progress Note      [address] [address] RECORD NUMBER:  [id number]  AGE: 66 y.o. RACE BLACK/  GENDER: female  : 1950  EPISODE DATE:  3/3/2022       Subjective:      CC (Chief Complaint) and HISTORY of PRESENT ILLNESS (HPI):    Deandre Pabon is a 66 y.o. female who presents today for wound/ulcer evaluation. She came in for her first visit with us on 3/3/2022. She is seeing us for a large stage IV sacral pressure ulcer  She was having packing change performed with Dakin solution prior to wound center enrollment. She had a history of septic shock related to a dialysis catheter in early 2022. This resulted in hospitalization  She was transferred to skilled nursing facility on 2022. She is brought back to the ER on 2022 with a stage II sacral pressure ulcer which has progressed to stage IV.   She had a wound VAC at one point which could not get a seal.  She had C. difficile infection and has had a fecal management system in place for more than a month by her report           his information was obtained from the patient  The following HPI elements were documented for the patient's wound:  Location: Large stage IV sacral pressure ulcer  Duration: Since approximately early 2022  Severity: Severe  Context:    Modifying Factors:  Nothing makes better or worse  Quality:    Timing:     Associated Signs and Symptoms: No recent fevers      Ulcer Identification:  Ulcer Type: pressure    Contributing Factors: chronic pressure, decreased mobility, incontinence of stool and obesity            PAST MEDICAL HISTORY  Past Medical History:   Diagnosis Date    Anemia     Arrhythmia     patient states she has a heart murmur,Echo 10/12/2021 EF 55-65% sees [facility]    CAD (coronary artery disease)     cardiac cath, stent x 1    Cancer ([facility] [address]     uterine    Chronic kidney disease ~2018    [facility] Dialysis MWF at [address]  in [place], Dialysis cath Right upper chest wall (11/2021)    Diabetes ([facility] [address]     typell, avgfbs 95, last A1C was 7.0 on 06/27/2021 s/sx of hypo below 60    Dialysis patient [facility])     M,W,F    Heart failure ([facility] [address]     Echo 10/12/2021 on Entresto 55-65%, improved from 6/27/2021 Echo 25%    Hypercholesterolemia     Hypertension     Morbid obesity ([facility] [address]     Nausea & vomiting         PAST SURGICAL HISTORY  Past Surgical History:   Procedure Laterality Date    HX COLONOSCOPY      HX DILATION AND CURETTAGE      HX HYSTERECTOMY      IR INSERT TUNL CVC W/O PORT OVER 5 YR  8/23/2021    right chest    VASCULAR SURGERY PROCEDURE UNLIST Left 05/30/2018    AVF creation    VASCULAR SURGERY PROCEDURE UNLIST Right 12/01/2021    Creation of right upper arm brachial axillary loop graft       FAMILY HISTORY  Family History   Problem Relation Age of Onset    Stroke Mother     Hypertension Mother     Heart Disease Father         MI    Hypertension Father     Heart Disease Sister     Diabetes Brother     Heart Disease Brother     Heart Disease Brother        SOCIAL HISTORY  Social History     Tobacco Use    Smoking status: Never Smoker    Smokeless tobacco: Never Used   Substance Use Topics    Alcohol use: No    Drug use: No       ALLERGIES  No Known Allergies    MEDICATIONS  Current Outpatient Medications on File Prior to Encounter   Medication Sig Dispense Refill    insulin lispro (HUMALOG) 100 unit/mL injection 100 Units by SubCUTAneous route two (2) times a day and at bedtime.  traMADoL (ULTRAM) 50 mg tablet Take 50 mg by mouth every six (6) hours as needed for Pain.  b complex-vitamin c-folic acid 0.8 mg (NEPHRO-OMKAR) 0.8 mg tab tablet Take 1 Tablet by mouth daily.  aspirin delayed-release 81 mg tablet Take  by mouth daily.  insulin NPH (NovoLIN N NPH U-100 Insulin) 100 unit/mL injection by SubCUTAneous route nightly.  If glucose level  greater than 150 than take 20 units  Indications: type 2 diabetes mellitus      sacubitriL-valsartan (Entresto) 24-26 mg tablet Take 1 Tablet by mouth two (2) times a day.  torsemide (DEMADEX) 100 mg tablet Take 100 mg by mouth daily.  cholecalciferol, VITAMIN D3, (VITAMIN D3) 5,000 unit tab tablet Take 5,000 Units by mouth daily.  metoprolol succinate (TOPROL-XL) 50 mg XL tablet Take 12.5 mg by mouth daily.  Ferrous Fumarate 325 mg (106 mg iron) tab Take  by mouth nightly. No current facility-administered medications on file prior to encounter. REVIEW OF SYSTEMS  The patient has no difficulty with chest pain or shortness of breath. No fever or chills. Comprehensive review of systems was otherwise unremarkable except as noted above. Objective:   Physical Exam:   Recent vitals (if inpt):  Patient Vitals for the past 24 hrs:   BP Temp Pulse Resp SpO2 Height   03/03/22 0831 (!) 145/83 97.8 °F (36.6 °C) 97 18 97 % 5' 4\" (1.626 m)       Visit Vitals  BP (!) 145/83 (BP 1 Location: Right upper arm, BP Patient Position: Sitting)   Pulse 97   Temp 97.8 °F (36.6 °C)   Resp 18   Ht 5' 4\" (1.626 m)   SpO2 97%   BMI 32.61 kg/m²     Wt Readings from Last 3 Encounters:   12/21/21 190 lb (86.2 kg)   12/01/21 191 lb 3.2 oz (86.7 kg)   11/11/21 191 lb 8 oz (86.9 kg)     Constitutional: Alert, oriented, cooperative patient in no acute distress; appears stated age;  General appearance is within normal limits for wound care patient population. See the today's recorded vitals signs and constitutional data. Eyes: Pupils equal; Sclera are clear. EOMs intact; The eyes appear to track and move normally. The sclera are not injected. The conjunctive are clear. The eyelids are normal. There is no scleral icterus. ENMT: There are no obvious external ear, nose, lip or mouth lesions. Nares normal; Neck: Overall contour of the neck is normal with no obvious neck masses.  Gross hearing is within normal limits. No obvious neck masses  Resp:  Breathing is non-labored; normal rate and effort; no audible wheezing. CV: RRR;  no JVD; No evidence of cyanosis of the upper extremities. The extremities are perfused without embolic sign, splinter hemorrhages, or petechia. GI: soft and non-distended without acute abnormality noted. Musculoskeletal: unremarkable with normal function. No embolic signs or cyanosis. Neuro:  Oriented; moves all 4; no focal deficits  Psychiatric: Judgement and insight are within normal limits for the wound care population of patients. Patient is oriented to person, place, and time. Recent and remote memory are within normal limits. Mood and affect are within normal limits. Integumentary (Skin/Wounds)  See inspection of wound(s) below. There are no other skin areas of palpable concern. See wound center documentation including photos in the [address] Wound Template made part of this record by reference.      Wounds:  Wound Arm Left;Mid (Active)   Number of days: 1373       Wound Arm Left (Active)   Number of days: 1312       Wound Sacrum (Active)   Wound Image    03/03/22 0846   Wound Etiology Pressure Stage 4 03/03/22 0846   Dressing Status Old drainage noted 03/03/22 0846   Cleansed Cleansed with saline 03/03/22 0846   Wound Length (cm) 12.5 cm 03/03/22 0846   Wound Width (cm) 11.5 cm 03/03/22 0846   Wound Depth (cm) 7.5 cm 03/03/22 0846   Wound Surface Area (cm^2) 143.75 cm^2 03/03/22 0846   Wound Volume (cm^3) 1078.125 cm^3 03/03/22 0846   Post-Procedure Length (cm) 12.5 cm 03/03/22 0846   Post-Procedure Width (cm) 11.5 cm 03/03/22 0846   Post-Procedure Depth (cm) 7.5 cm 03/03/22 0846   Post-Procedure Surface Area (cm^2) 143.75 cm^2 03/03/22 0846   Post-Procedure Volume (cm^3) 1078.125 cm^3 03/03/22 0846   Wound Assessment Granulation tissue;Slough 03/03/22 0846   Drainage Amount Large 03/03/22 0846   Drainage Description Serosanguinous 03/03/22 0846   Wound Odor Mild 03/03/22 0846   Wound Thickness Description Full thickness 03/03/22 0846   Number of days: 0       Incision 08/23/21 Arm Left (Active)   Number of days: 192       Incision 12/01/21 Arm Right (Active)   Number of days: 92                  Amount and/or Complexity of Data Reviewed and Analyzed:  I reviewed and analyzed all of the unique labs and radiologic studies that are shown below as well as any that are in the HPI, and any that are in the expanded problem list below  *Each unique test, order, or document contributes to the combination of 2 or combination of 3 in Category 1 below. I also independently reviewed radiology images for studies I described above in the HPI or studies I have ordered. For this visit I also reviewed old records and prior notes. No results for input(s): WBC, HGB, PLT, NA, K, CL, CO2, BUN, CREA, GLU, PTP, INR, APTT, TBIL, TBILI, CBIL, ALT, AP, AML, AML, LPSE, LCAD, NH4, TROPT, TROIQ, PCO2, PO2, HCO3, HGBEXT, PLTEXT, INREXT, HGBEXT, PLTEXT, INREXT in the last 72 hours. No lab exists for component: SGOT, GPT,  PH  No results for input(s): PTP, INR, APTT, TBIL, TBILI, CBIL, ALT, AP, AML, LPSE, INREXT, INREXT in the last 72 hours.     No lab exists for component: SGOT, GPT    Most recent blood counts (CBC) review  Lab Results   Component Value Date/Time    WBC 9.6 02/03/2022 10:28 PM    HGB 6.7 (LL) 02/03/2022 10:28 PM    HCT 21.7 (L) 02/03/2022 10:28 PM    PLATELET 440 79/21/1953 10:28 PM    MCV 90.0 02/03/2022 10:28 PM     Most recent chemistry (BMP) test review   Lab Results   Component Value Date/Time    Sodium 138 08/23/2021 11:01 AM    Potassium 4.7 08/23/2021 11:01 AM    Chloride 102 08/23/2021 11:01 AM    CO2 27 08/23/2021 11:01 AM    Anion gap 9 08/23/2021 11:01 AM    Glucose 162 (H) 08/23/2021 11:01 AM    BUN 64 (H) 08/23/2021 11:01 AM    Creatinine 10.40 (H) 08/23/2021 11:01 AM    GFR est AA 5 (L) 08/23/2021 11:01 AM    GFR est non-AA 4 (L) 08/23/2021 11:01 AM Calcium 8.3 08/23/2021 11:01 AM     Most recent Liver enzyme test review  No results found for: ALT, AST, GGT, GGTP, AP, APIT, APX, CBIL, TBIL, TBILI  Most recent coagulation (coags) review  No results found for: INR, PTMR, PTP, PT1, PT2, INREXT, INREXT  No results found for: INR, APTT, CBIL, AML, AML, LPSE, LCAD, NH4, TROPT, TROIQ, INREXT, INREXT    Diabetic assessment  No results found for: HBA1C, AEO9MWNX, HGU8VLUT, VHR8ZYLW    Nutritional assessment screen to assess wound healing ability:  No results found for: TP, ALBR, TALB, ALB  No results found for: TP, ALBR, TALB, ALB    XR Results (most recent):  Results from Hospital Encounter encounter on 08/23/21    XR FLUOROSCOPY UNDER 60 MINUTES    Narrative  Administrative Report    Fluoroscopy was provided in the operating room. Images were saved as a reference  for the surgical procedure. The operative report can be viewed in [address] [place] [address]  and/or retrieved by the Medical Records department. A Radiologist has not reviewed these images. CT Results (most recent):  No results found for this or any previous visit. Admission date (for inpatients): 3/3/2022   * No surgery found *  * No surgery found *    Assessment:      Problem List Items Addressed This Visit     None          Problem List  Date Reviewed: 3/3/2022          Codes Class Noted    Stage 4 skin ulcer of sacral region [facility]) ICD-10-CM: L98.429  ICD-9-CM: 707.8  3/3/2022        Other specified anemias ICD-10-CM: D64.89  ICD-9-CM: 285.8  3/3/2022        Fecal incontinence ICD-10-CM: R15.9  ICD-9-CM: 787.60  3/3/2022        C. difficile colitis ICD-10-CM: A04.72  ICD-9-CM: 008.45  3/3/2022        Obesity, morbid ([facility] [address] ICD-10-CM: E66.01  ICD-9-CM: 278.01  5/23/2018              Active Problems:    * No active hospital problems.  *          Plan:     Number and Complexity of Problems addressed and   Risks of complications and/or morbidity of management    Treatment Note please see attached Discharge Instructions  Any procedures done today in the wound center (if documented in a separate note) in [facility] are made part of this record by reference  Written patient dismissal instructions given to patient or POA. Large stage IV sacral pressure ulcer  I sharply and selectively debrided this with a #15 scalpel on 3/3/2022. Stop Dakin's solution and initiation of saline moistened gauze Kerlix packing into the sacral wound BID + after each bowel movement  Recommend discontinuation of fecal management system as it has been in place for >1mo and she is having formed stool coming out around the catheter. Recommend nutritional consultation for high-protein supplements  Commend level 2 low air loss mattress. Recommend frequent turning side to side. See us in 2 weeks or sooner if needed    Anemia   Hgb 6.7 on 2/3/22  Will need transfusion with dialysis                Wound Care  Orders Placed This Encounter    WOUND CARE, DRESSING CHANGE     Sacral Wound:  Cleanse wound with normal saline. Apply saline wet-to-dry gauze to wound bed. Cover with ABD pads and paper tape. Change 2 times a day and whenever soiled.    -Wound debrided by Dr. Jose Connor at wound center 3/3/22    -Lay side-to-side. -REMOVE RECTAL TUBE 3/3/22 PER DR. Kathy Ayala. Standing Status:   Standing     Number of Occurrences:   1    insulin lispro (HUMALOG) 100 unit/mL injection     Si Units by SubCUTAneous route two (2) times a day and at bedtime.  traMADoL (ULTRAM) 50 mg tablet     Sig: Take 50 mg by mouth every six (6) hours as needed for Pain.  b complex-vitamin c-folic acid 0.8 mg (NEPHRO-OMKAR) 0.8 mg tab tablet     Sig: Take 1 Tablet by mouth daily.        Follow-up Information     Follow up With Specialties Details Why Contact Info    [address] In 2 weeks  [address] [place] [address] [facility] [address] [phone]                  Level of MDM (2/3 elements below)  Number and Complexity of Problems Addressed Amount and/or Complexity of Data to be Reviewed and Analyzed  *Each unique test, order, or document contributes to the combination of 2 or combination of 3 in Category 1 below.  Risk of Complications and/or Morbidity or Mortality of pt Management     17620  60056 SF Minimal  1self-limited or minor problem Minimal or none Minimal risk of morbidity from additional diagnostic testing or Rx   07496  42480 Low Low  2or more self-limited or minor problems;    or  1stable chronic illness;    or  5NUKHX, uncomplicated illness or injury   Limited  (Must meet the requirements of at least 1 of the 2 categories)  Category 1: Tests and documents   Any combination of 2 from the following:  Review of prior external note(s) from each unique source*;  review of the result(s) of each unique test*;   ordering of each unique test*    or   Category 2: Assessment requiring an independent historian(s)  (For the categories of independent interpretation of tests and discussion of management or test interpretation, see moderate or high) Low risk of morbidity from additional diagnostic testing or treatment     88732  82123 Mod Moderate  1or more chronic illnesses with exacerbation, progression, or side effects of treatment;    or  2or more stable chronic illnesses;    or  1undiagnosed new problem with uncertain prognosis;    or  1acute illness with systemic symptoms;    or  7OLYRR complicated injury   Moderate  (Must meet the requirements of at least 1 out of 3 categories)  Category 1: Tests, documents, or independent historian(s)  Any combination of 3 from the following:   Review of prior external note(s) from each unique source*;  Review of the result(s) of each unique test*;  Ordering of each unique test*;  Assessment requiring an independent historian(s)    or  Category 2: Independent interpretation of tests   Independent interpretation of a test performed by another physician/other qualified health care professional (not separately reported);     or  Category 3: Discussion of management or test interpretation  Discussion of management or test interpretation with external physician/other qualified health care professional/appropriate source (not separately reported)   Moderate risk of morbidity from additional diagnostic testing or treatment  Examples only:  Prescription drug management   Decision regarding minor surgery with identified patient or procedure risk factors  Decision regarding elective major surgery without identified patient or procedure risk factors   Diagnosis or treatment significantly limited by social determinants of health       20009  31281 High High  1or more chronic illnesses with severe exacerbation, progression, or side effects of treatment;    or  1 acute or chronic illness or injury that poses a threat to life or bodily function   Extensive  (Must meet the requirements of at least 2 out of 3 categories)  Category 1: Tests, documents, or independent historian(s)  Any combination of 3 from the following:   Review of prior external note(s) from each unique source*;  Review of the result(s) of each unique test*;   Ordering of each unique test*;   Assessment requiring an independent historian(s)    or   Category 2: Independent interpretation of tests   Independent interpretation of a test performed by another physician/other qualified health care professional (not separately reported);     or  Category 3: Discussion of management or test interpretation  Discussion of management or test interpretation with external physician/other qualified health care professional/appropriate source (not separately reported)   High risk of morbidity from additional diagnostic testing or treatment  Examples only:  Drug therapy requiring intensive monitoring for toxicity  Decision regarding elective major surgery with identified patient or procedure risk factors  Decision regarding emergency major surgery  Decision regarding hospitalization  Decision not to resuscitate or to de-escalate care because of poor prognosis                 I have personally performed a face-to-face diagnostic evaluation and management  service on this patient. I have independently seen the patient. I have independently obtained the above history from the patient/family. I have independently examined the patient with above findings. I have independently reviewed data/labs for this patient and developed the above plan of care (MDM).   Electronically signed by Izzy Walsh MD on 3/3/2022 at 7:59 AM

## 2022-03-18 PROBLEM — L98.429 STAGE 4 SKIN ULCER OF SACRAL REGION (HCC): Status: ACTIVE | Noted: 2022-03-03

## 2022-03-18 PROBLEM — D64.89 OTHER SPECIFIED ANEMIAS: Status: ACTIVE | Noted: 2022-03-03

## 2022-03-19 PROBLEM — E66.01 OBESITY, MORBID (HCC): Status: ACTIVE | Noted: 2018-05-23

## 2022-03-19 PROBLEM — R15.9 FECAL INCONTINENCE: Status: ACTIVE | Noted: 2022-03-03

## 2022-03-19 PROBLEM — A04.72 C. DIFFICILE COLITIS: Status: ACTIVE | Noted: 2022-03-03

## 2022-03-23 ENCOUNTER — TELEPHONE (OUTPATIENT)
Dept: WOUND CARE | Age: 72
End: 2022-03-23

## 2022-03-23 NOTE — TELEPHONE ENCOUNTER
Received call from [facility] to decrease frequency of [facility] visits, pt is scheduled to come back to wound center tomorrow. Attempted to return call 3 times to confirm reasoning for decreasing frequency.

## 2022-03-24 ENCOUNTER — HOSPITAL ENCOUNTER (OUTPATIENT)
Dept: WOUND CARE | Age: 72
Discharge: HOME | End: 2022-03-24
Attending: SURGERY
Payer: COMMERCIAL

## 2022-03-24 VITALS
HEART RATE: 62 BPM | RESPIRATION RATE: 18 BRPM | DIASTOLIC BLOOD PRESSURE: 92 MMHG | HEIGHT: 64 IN | BODY MASS INDEX: 29.43 KG/M2 | WEIGHT: 172.4 LBS | SYSTOLIC BLOOD PRESSURE: 211 MMHG | TEMPERATURE: 97.9 F

## 2022-03-24 PROCEDURE — 17250 CHEM CAUT OF GRANLTJ TISSUE: CPT

## 2022-03-24 PROCEDURE — 17250 CHEM CAUT OF GRANLTJ TISSUE: CPT | Performed by: SURGERY

## 2022-03-24 PROCEDURE — 99213 OFFICE O/P EST LOW 20 MIN: CPT | Performed by: SURGERY

## 2022-03-24 RX ORDER — BACITRACIN 500 [USP'U]/G
OINTMENT TOPICAL ONCE
Status: CANCELLED | OUTPATIENT
Start: 2022-03-24 | End: 2022-03-24

## 2022-03-24 RX ORDER — GENTAMICIN SULFATE 1 MG/G
OINTMENT TOPICAL ONCE
Status: CANCELLED | OUTPATIENT
Start: 2022-03-24 | End: 2022-03-24

## 2022-03-24 RX ORDER — BETAMETHASONE DIPROPIONATE 0.5 MG/G
OINTMENT TOPICAL ONCE
Status: CANCELLED | OUTPATIENT
Start: 2022-03-24 | End: 2022-03-24

## 2022-03-24 RX ORDER — LIDOCAINE HYDROCHLORIDE 20 MG/ML
5 SOLUTION OROPHARYNGEAL ONCE
Status: CANCELLED | OUTPATIENT
Start: 2022-03-24 | End: 2022-03-24

## 2022-03-24 RX ORDER — LIDOCAINE HYDROCHLORIDE 20 MG/ML
JELLY TOPICAL ONCE
Status: CANCELLED | OUTPATIENT
Start: 2022-03-24 | End: 2022-03-24

## 2022-03-24 RX ORDER — BACITRACIN ZINC AND POLYMYXIN B SULFATE 500; 1000 [USP'U]/G; [USP'U]/G
OINTMENT TOPICAL ONCE
Status: CANCELLED | OUTPATIENT
Start: 2022-03-24 | End: 2022-03-24

## 2022-03-24 RX ORDER — CLOBETASOL PROPIONATE 0.5 MG/G
OINTMENT TOPICAL ONCE
Status: CANCELLED | OUTPATIENT
Start: 2022-03-24 | End: 2022-03-24

## 2022-03-24 RX ORDER — SILVER SULFADIAZINE 10 G/1000G
CREAM TOPICAL ONCE
Status: CANCELLED | OUTPATIENT
Start: 2022-03-24 | End: 2022-03-24

## 2022-03-24 RX ORDER — MUPIROCIN 20 MG/G
OINTMENT TOPICAL ONCE
Status: CANCELLED | OUTPATIENT
Start: 2022-03-24 | End: 2022-03-24

## 2022-03-24 RX ORDER — LIDOCAINE HYDROCHLORIDE 40 MG/ML
SOLUTION TOPICAL ONCE
Status: CANCELLED | OUTPATIENT
Start: 2022-03-24 | End: 2022-03-24

## 2022-03-24 RX ORDER — LIDOCAINE 40 MG/G
CREAM TOPICAL ONCE
Status: CANCELLED | OUTPATIENT
Start: 2022-03-24 | End: 2022-03-24

## 2022-03-24 RX ORDER — LISINOPRIL 40 MG/1
40 TABLET ORAL DAILY
COMMUNITY

## 2022-03-24 RX ORDER — LIDOCAINE 50 MG/G
OINTMENT TOPICAL ONCE
Status: CANCELLED | OUTPATIENT
Start: 2022-03-24 | End: 2022-03-24

## 2022-03-24 RX ORDER — TRIAMCINOLONE ACETONIDE 1 MG/G
OINTMENT TOPICAL ONCE
Status: CANCELLED | OUTPATIENT
Start: 2022-03-24 | End: 2022-03-24

## 2022-03-24 NOTE — WOUND CARE
03/24/22 0909   Wound Sacrum   Date First Assessed/Time First Assessed: 03/03/22 0838   Present on Hospital Admission: Yes  Wound Approximate Age at First Assessment (Weeks): 10 weeks  Primary Wound Type: Pressure Injury  Location: Sacrum   Wound Image    Wound Etiology Pressure Stage 4   Dressing Status Old drainage noted   Cleansed Cleansed with saline   Dressing/Treatment Gauze dressing/dressing sponge;Moist to moist;ABD pad   Wound Length (cm) 9 cm   Wound Width (cm) 8.8 cm   Wound Depth (cm) 3.5 cm   Wound Surface Area (cm^2) 79.2 cm^2   Change in Wound Size % 44.9   Wound Volume (cm^3) 277.2 cm^3   Wound Healing % 74   Undermining Starts ___ O'Clock 9 o'clock   Undermining Ends ___ O'Clock 12 o'clock   Undermining Maximum Distance (cm) 7.5 cm   Wound Assessment Granulation tissue;Slough; Exposed Structure Tendon   Drainage Amount Moderate   Drainage Description Serosanguinous   Wound Odor None   Michaela-Wound/Incision Assessment Intact   Wound Thickness Description Full thickness   patient is taking aspirin

## 2022-03-24 NOTE — PROGRESS NOTES
[address], [address], [address]  [phone]    Progress Notes   Analia Campo       MRN: [id number]     : 1950     Age: 66 y.o. Subjective/HPI:   This patient is a 66 y.o. female who is a patient of Dr. Rafael Vail seen and evaluated at the wound clinic for follow-up of stage IV sacral decubitus. She has been receiving saline dressing changes twice a day and after bowel movements. Patient is without complaints today and denies any unusual pain or discomfort nor has she had any redness or purulent drainage. No changes in overall health since last visit. Patient has not been transfused since her last visit. She reports normal bowel habits and a good appetite. Review of Systems  A comprehensive review of systems was negative except for that written in the HPI.     Past Medical History:   Diagnosis Date    Anemia     Arrhythmia     patient states she has a heart murmur,Echo 10/12/2021 EF 55-65% sees [facility]    CAD (coronary artery disease)     cardiac cath, stent x 1    Cancer ([address]     uterine    Chronic kidney disease ~2018    ESRRD Dialysis MWF at [facility]  in [facility], Dialysis cath Right upper chest wall (2021)    Diabetes ([address]     typell, avgfbs 95, last A1C was 7.0 on 2021 s/sx of hypo below 60    Dialysis patient [facility])     M,W,F    Heart failure ([address]     Echo 10/12/2021 on Entresto 55-65%, improved from 2021 Echo 25%    Hypercholesterolemia     Hypertension     Morbid obesity ([address]     Nausea & vomiting       Past Surgical History:   Procedure Laterality Date    HX COLONOSCOPY      HX DILATION AND CURETTAGE      HX HYSTERECTOMY      IR INSERT TUNL CVC W/O PORT OVER 5 YR  2021    right chest    VASCULAR SURGERY PROCEDURE UNLIST Left 2018    AVF creation    VASCULAR SURGERY PROCEDURE UNLIST Right 2021    Creation of right upper arm brachial axillary loop graft      No Known Allergies   Social History     Tobacco Use    Smoking status: Never Smoker    Smokeless tobacco: Never Used   Substance Use Topics    Alcohol use: No      Social History     Social History Narrative    Not on file     Family History   Problem Relation Age of Onset    Stroke Mother     Hypertension Mother     Heart Disease Father         MI    Hypertension Father     Heart Disease Sister     Diabetes Brother     Heart Disease Brother     Heart Disease Brother       Cannot display prior to admission medications because the patient has not been admitted in this contact. Current Outpatient Medications   Medication Sig    lisinopriL (PRINIVIL, ZESTRIL) 40 mg tablet Take 40 mg by mouth daily.  insulin lispro (HUMALOG) 100 unit/mL injection 100 Units by SubCUTAneous route two (2) times a day and at bedtime.  traMADoL (ULTRAM) 50 mg tablet Take 50 mg by mouth every six (6) hours as needed for Pain.  b complex-vitamin c-folic acid 0.8 mg (NEPHRO-OMKAR) 0.8 mg tab tablet Take 1 Tablet by mouth daily.  insulin NPH (NovoLIN N NPH U-100 Insulin) 100 unit/mL injection by SubCUTAneous route nightly. If glucose level  greater than 150 than take 20 units  Indications: type 2 diabetes mellitus    cholecalciferol, VITAMIN D3, (VITAMIN D3) 5,000 unit tab tablet Take 5,000 Units by mouth daily.  metoprolol succinate (TOPROL-XL) 50 mg XL tablet Take 25 mg by mouth daily.  aspirin delayed-release 81 mg tablet Take  by mouth daily.  Ferrous Fumarate 325 mg (106 mg iron) tab Take  by mouth nightly. No current facility-administered medications for this encounter.      Objective:     Vitals:    03/24/22 0855 03/24/22 0904   BP:  (!) 211/92   Pulse:  62   Resp:  18   Temp:  97.9 °F (36.6 °C)   Weight: 78.2 kg (172 lb 6.4 oz)    Height: 5' 4\" (1.626 m)        Physical Exam:  Ambulation: Walker  Gen- the patient is well developed and in no acute distress  HEENT- no focal abnormalities of the scalp or face. Neck- no JVD or retractions  Lungs- normal respiratory effort. Cardiovascular:  Lower limb pulses: Not evaluated. Abd- soft and no masses evident. Ext- warm without cyanosis. There is no significant lower leg edema. Skin- no jaundice or rashes. Stage IV sacral decubitus that measures smaller today, hypertrophic granulation tissue around the perimeter. Please see the specific measurements and descriptions of the wound(s) below. There is no evidence of periwound induration or warmth. No purulence or odor. Neuro- alert and oriented x 3. No gross imotor deficits are present. Lower limb sensation is intact. Psychological: Affect normal. Mood normal. Memory intact. Wound Arm Left;Mid (Active)   Number of days: 1394       Wound Arm Left (Active)   Number of days: 1333       Wound Sacrum (Active)   Wound Image   03/24/22 0909   Wound Etiology Pressure Stage 4 03/24/22 0909   Dressing Status Old drainage noted 03/24/22 0909   Cleansed Cleansed with saline 03/24/22 0909   Dressing/Treatment Gauze dressing/dressing sponge;Moist to moist;ABD pad 03/24/22 0909   Wound Length (cm) 9 cm 03/24/22 0909   Wound Width (cm) 8.8 cm 03/24/22 0909   Wound Depth (cm) 3.5 cm 03/24/22 0909   Wound Surface Area (cm^2) 79.2 cm^2 03/24/22 0909   Change in Wound Size % 44.9 03/24/22 0909   Wound Volume (cm^3) 277.2 cm^3 03/24/22 0909   Wound Healing % 74 03/24/22 0909   Post-Procedure Length (cm) 12.5 cm 03/03/22 0846   Post-Procedure Width (cm) 11.5 cm 03/03/22 0846   Post-Procedure Depth (cm) 7.5 cm 03/03/22 0846   Post-Procedure Surface Area (cm^2) 143.75 cm^2 03/03/22 0846   Post-Procedure Volume (cm^3) 1078.125 cm^3 03/03/22 0846   Undermining Starts ___ O'Clock 9 o'clock 03/24/22 0909   Undermining Ends ___ O'Clock 12 o'clock 03/24/22 0909   Undermining Maximum Distance (cm) 7.5 cm 03/24/22 0909   Wound Assessment Granulation tissue;Slough; Exposed Structure Tendon 03/24/22 0909   Drainage Amount Moderate 03/24/22 2955   Drainage Description Serosanguinous 03/24/22 0909   Wound Odor None 03/24/22 0909   Michaela-Wound/Incision Assessment Intact 03/24/22 0909   Wound Thickness Description Full thickness 03/24/22 0909   Number of days: 21       Incision 08/23/21 Arm Left (Active)   Number of days: 213       Incision 12/01/21 Arm Right (Active)   Number of days: 113        Assessment:   Stage IV sacral decubitus  Patient Active Problem List   Diagnosis Code    Obesity, morbid ([facility] E66.01    Stage 4 skin ulcer of sacral region [facility]) L98.429    Other specified anemias D64.89    Fecal incontinence R15.9    C. difficile colitis A04.72     Plan:     Silver nitrate was applied to the hypertrophic granulation tissue. Plan to continue saline dressing changes and pressure offloading. Follow-up in 2 weeks for recheck. Follow-up Information    None           Thank you very much for the opportunity to participate in this patient's care. Signed By: Aaliyah Last MD     March 24, 2022        TIME:  If total time for today's E/M service is used for level of service it is documented below. This time includes physician non-face-to-face service time visit on the date of service and includes    Preparing to see the patient (eg, review of tests)  Obtaining and/or reviewing separately obtained history  Performing a medically necessary appropriate examination and/or evaluation  Counseling and educating the patient/family/caregiver  Ordering medications, tests, or procedures  Referring and communicating with other health care professionals as needed  Documenting clinical information in the electronic or other health record  Independently interpreting results (not reported separately) and communicating results to the patient/family/caregiver  Care coordination (not reported separately)    E/M time =     20   minutes.

## 2022-03-24 NOTE — WOUND CARE
[address]  [address], [address] [facility]  Phone: [phone]  Fax: [phone]    Patient: Demetrius Márquez MRN: [id number]  SSN: [id number]    YOB: 1950  Age: 66 y.o. Sex: female       Return Appointment: 2 weeks with Harsha    Instructions: Sacral Wound:  Cleanse wound with normal saline. Apply saline wet-to-dry gauze to wound bed. Cover with ABD pads and paper tape. Change 2 times a day and whenever soiled. Silver nitrate used today. Home health for dressing changes 3 times weekly. Should you experience increased redness, swelling, pain, foul odor, size of wound(s), or have a temperature over 101 degrees please contact the [address] [place] [address] Road at [phone] or if after hours contact your primary care physician or go to the hospital emergency department.     Signed By: Celestine Sever, RN     March 24, 2022

## 2022-04-07 ENCOUNTER — HOSPITAL ENCOUNTER (OUTPATIENT)
Dept: WOUND CARE | Age: 72
Discharge: HOME | End: 2022-04-07
Attending: SURGERY
Payer: COMMERCIAL

## 2022-04-07 VITALS
HEART RATE: 62 BPM | WEIGHT: 168.4 LBS | HEIGHT: 64 IN | DIASTOLIC BLOOD PRESSURE: 74 MMHG | RESPIRATION RATE: 18 BRPM | BODY MASS INDEX: 28.75 KG/M2 | SYSTOLIC BLOOD PRESSURE: 166 MMHG | TEMPERATURE: 98 F

## 2022-04-07 DIAGNOSIS — L98.429 STAGE 4 SKIN ULCER OF SACRAL REGION (HCC): Primary | ICD-10-CM

## 2022-04-07 PROCEDURE — 99213 OFFICE O/P EST LOW 20 MIN: CPT | Performed by: SURGERY

## 2022-04-07 PROCEDURE — 17250 CHEM CAUT OF GRANLTJ TISSUE: CPT

## 2022-04-07 RX ORDER — LIDOCAINE HYDROCHLORIDE 20 MG/ML
5 SOLUTION OROPHARYNGEAL ONCE
Status: CANCELLED | OUTPATIENT
Start: 2022-04-07 | End: 2022-04-07

## 2022-04-07 RX ORDER — CLOBETASOL PROPIONATE 0.5 MG/G
OINTMENT TOPICAL ONCE
Status: CANCELLED | OUTPATIENT
Start: 2022-04-07 | End: 2022-04-07

## 2022-04-07 RX ORDER — BACITRACIN ZINC AND POLYMYXIN B SULFATE 500; 1000 [USP'U]/G; [USP'U]/G
OINTMENT TOPICAL ONCE
Status: CANCELLED | OUTPATIENT
Start: 2022-04-07 | End: 2022-04-07

## 2022-04-07 RX ORDER — LIDOCAINE 50 MG/G
OINTMENT TOPICAL ONCE
Status: CANCELLED | OUTPATIENT
Start: 2022-04-07 | End: 2022-04-07

## 2022-04-07 RX ORDER — LIDOCAINE HYDROCHLORIDE 40 MG/ML
SOLUTION TOPICAL ONCE
Status: CANCELLED | OUTPATIENT
Start: 2022-04-07 | End: 2022-04-07

## 2022-04-07 RX ORDER — GENTAMICIN SULFATE 1 MG/G
OINTMENT TOPICAL ONCE
Status: CANCELLED | OUTPATIENT
Start: 2022-04-07 | End: 2022-04-07

## 2022-04-07 RX ORDER — TRIAMCINOLONE ACETONIDE 1 MG/G
OINTMENT TOPICAL ONCE
Status: CANCELLED | OUTPATIENT
Start: 2022-04-07 | End: 2022-04-07

## 2022-04-07 RX ORDER — SILVER NITRATE 38.21; 12.74 MG/1; MG/1
1 STICK TOPICAL ONCE
Status: COMPLETED | OUTPATIENT
Start: 2022-04-07 | End: 2022-04-07

## 2022-04-07 RX ORDER — LIDOCAINE HYDROCHLORIDE 20 MG/ML
JELLY TOPICAL ONCE
Status: CANCELLED | OUTPATIENT
Start: 2022-04-07 | End: 2022-04-07

## 2022-04-07 RX ORDER — MUPIROCIN 20 MG/G
OINTMENT TOPICAL ONCE
Status: CANCELLED | OUTPATIENT
Start: 2022-04-07 | End: 2022-04-07

## 2022-04-07 RX ORDER — GABAPENTIN 100 MG/1
100 CAPSULE ORAL DAILY
COMMUNITY

## 2022-04-07 RX ORDER — LIDOCAINE 40 MG/G
CREAM TOPICAL ONCE
Status: CANCELLED | OUTPATIENT
Start: 2022-04-07 | End: 2022-04-07

## 2022-04-07 RX ORDER — BETAMETHASONE DIPROPIONATE 0.5 MG/G
OINTMENT TOPICAL ONCE
Status: CANCELLED | OUTPATIENT
Start: 2022-04-07 | End: 2022-04-07

## 2022-04-07 RX ORDER — SILVER SULFADIAZINE 10 G/1000G
CREAM TOPICAL ONCE
Status: CANCELLED | OUTPATIENT
Start: 2022-04-07 | End: 2022-04-07

## 2022-04-07 RX ORDER — BACITRACIN 500 [USP'U]/G
OINTMENT TOPICAL ONCE
Status: CANCELLED | OUTPATIENT
Start: 2022-04-07 | End: 2022-04-07

## 2022-04-07 RX ADMIN — SILVER NITRATE 1 APPLICATOR: 38.21; 12.74 STICK TOPICAL at 10:39

## 2022-04-07 NOTE — DISCHARGE INSTRUCTIONS
[address]  [address], [address] [facility]  Phone: [phone]  Fax: [phone]    Patient: David Valdez MRN: [id number]  SSN: [id number]    YOB: 1950  Age: 66 y.o. Sex: female       Return Appointment: 2 weeks with Beatriz Bullock MD    Instructions:  Sacral Wound:  Cleanse wound with normal saline. Apply saline wet-to-dry gauze to wound bed. Cover with ABD pads and paper tape. Change 2 times a day and whenever soiled.     Silver nitrate used today.     Home health for dressing changes 3 times weekl    Should you experience increased redness, swelling, pain, foul odor, size of wound(s), or have a temperature over 101 degrees please contact the [address] [place] [address] Road at [phone] or if after hours contact your primary care physician or go to the hospital emergency department.     Signed By: Susy Orozco     April 7, 2022

## 2022-04-07 NOTE — WOUND CARE
04/07/22 1017   Wound Sacrum   Date First Assessed/Time First Assessed: 03/03/22 0838   Present on Hospital Admission: Yes  Wound Approximate Age at First Assessment (Weeks): 10 weeks  Primary Wound Type: Pressure Injury  Location: Sacrum   Wound Image    Wound Etiology Pressure Stage 4   Dressing Status Clean;Dry; Intact   Cleansed Cleansed with saline   Dressing/Treatment Gauze dressing/dressing sponge   Wound Length (cm) 9 cm   Wound Width (cm) 3.4 cm   Wound Depth (cm) 3.5 cm   Wound Surface Area (cm^2) 30.6 cm^2   Change in Wound Size % 78.71   Wound Volume (cm^3) 107.1 cm^3   Wound Healing % 90   Undermining Starts ___ O'Clock 9 o'clock   Undermining Ends ___ O'Clock 2 o'clock   Undermining Maximum Distance (cm) 5.6 cm   Wound Assessment Granulation tissue   Drainage Amount Moderate   Drainage Description Serosanguinous   Wound Odor None   Michaela-Wound/Incision Assessment Intact   Wound Thickness Description Full thickness   Patient is on asa daily.

## 2022-04-07 NOTE — PROGRESS NOTES
[address], [address] [address]  [place], [address]  [phone]    Progress Notes   Unruly Larson       MRN: [id number]     : 1950     Age: 66 y.o. Subjective/HPI:   This patient is a 66 y.o. female seen and evaluated at the wound clinic for follow-up of stage IV sacral ulcer. Patient has been receiving saline dressing changes twice a day and after bowel movements. On today's visit she is without complaints and reports no unusual pain or discomfort nor has she had any drainage from the wound. Patient reports no significant changes in her overall health since her previous visit. She continues to have a good appetite. Review of Systems  A comprehensive review of systems was negative except for that written in the HPI.     Past Medical History:   Diagnosis Date    Anemia     Arrhythmia     patient states she has a heart murmur,Echo 10/12/2021 EF 55-65% sees [facility]    CAD (coronary artery disease)     cardiac cath, stent x 1    Cancer ([address]     uterine    Chronic kidney disease ~2018    ESRRD Dialysis MWF at [address]  in [facility], Dialysis cath Right upper chest wall (2021)    Diabetes ([address]     typell, avgfbs 95, last A1C was 7.0 on 2021 s/sx of hypo below 60    Dialysis patient [facility])     M,W,F    Heart failure ([address]     Echo 10/12/2021 on Entresto 55-65%, improved from 2021 Echo 25%    Hypercholesterolemia     Hypertension     Morbid obesity ([address]     Nausea & vomiting       Past Surgical History:   Procedure Laterality Date    HX COLONOSCOPY      HX DILATION AND CURETTAGE      HX HYSTERECTOMY      IR INSERT TUNL CVC W/O PORT OVER 5 YR  2021    right chest    VASCULAR SURGERY PROCEDURE UNLIST Left 2018    AVF creation    VASCULAR SURGERY PROCEDURE UNLIST Right 2021    Creation of right upper arm brachial axillary loop graft      No Known Allergies   Social History     Tobacco Use    Smoking status: Never Smoker    Smokeless tobacco: Never Used   Substance Use Topics    Alcohol use: No      Social History     Social History Narrative    Not on file     Family History   Problem Relation Age of Onset    Stroke Mother     Hypertension Mother     Heart Disease Father         MI    Hypertension Father     Heart Disease Sister     Diabetes Brother     Heart Disease Brother     Heart Disease Brother       Cannot display prior to admission medications because the patient has not been admitted in this contact. Current Outpatient Medications   Medication Sig    gabapentin (NEURONTIN) 100 mg capsule Take 100 mg by mouth daily.  lisinopriL (PRINIVIL, ZESTRIL) 40 mg tablet Take 40 mg by mouth daily.  insulin lispro (HUMALOG) 100 unit/mL injection 100 Units by SubCUTAneous route two (2) times a day and at bedtime.  traMADoL (ULTRAM) 50 mg tablet Take 50 mg by mouth every six (6) hours as needed for Pain.  b complex-vitamin c-folic acid 0.8 mg (NEPHRO-OMKAR) 0.8 mg tab tablet Take 1 Tablet by mouth daily.  insulin NPH (NovoLIN N NPH U-100 Insulin) 100 unit/mL injection by SubCUTAneous route nightly. If glucose level  greater than 150 than take 20 units  Indications: type 2 diabetes mellitus    cholecalciferol, VITAMIN D3, (VITAMIN D3) 5,000 unit tab tablet Take 5,000 Units by mouth daily.  metoprolol succinate (TOPROL-XL) 50 mg XL tablet Take 50 mg by mouth daily.  aspirin delayed-release 81 mg tablet Take  by mouth daily.  Ferrous Fumarate 325 mg (106 mg iron) tab Take  by mouth nightly. No current facility-administered medications for this encounter.      Objective:     Vitals:    04/07/22 1007   BP: (!) 166/74   Pulse: 62   Resp: 18   Temp: 98 °F (36.7 °C)   Weight: 76.4 kg (168 lb 6.4 oz)   Height: 5' 4\" (1.626 m)       Physical Exam:  Ambulation: Walker  Gen- the patient is well developed and in no acute distress  HEENT- no focal abnormalities of the scalp or face.  Neck- no JVD or retractions  Lungs- normal respiratory effort. Cardiovascular:  Lower limb pulses: Not evaluated. Abd- soft and no masses evident. Ext- warm without cyanosis. There is no lower leg edema . Skin- no jaundice or rashes. Stage IV sacral decubitus there is new epithelialization inferiorly, some hypertrophic granulation tissue noted inferiorly. Please see the specific measurements and descriptions of the wound(s) below. There is no evidence of periwound induration or warmth. No purulence or odor. Neuro- alert and oriented x 3. No gross imotor deficits are present. Psychological: Affect normal. Mood normal. Memory intact. Wound Arm Left;Mid (Active)   Number of days: 1408       Wound Arm Left (Active)   Number of days: 1347       Wound Sacrum (Active)   Wound Image   04/07/22 1017   Wound Etiology Pressure Stage 4 04/07/22 1017   Dressing Status Clean;Dry; Intact 04/07/22 1017   Cleansed Cleansed with saline 04/07/22 1017   Dressing/Treatment Gauze dressing/dressing sponge 04/07/22 1017   Wound Length (cm) 9 cm 04/07/22 1017   Wound Width (cm) 3.4 cm 04/07/22 1017   Wound Depth (cm) 3.5 cm 04/07/22 1017   Wound Surface Area (cm^2) 30.6 cm^2 04/07/22 1017   Change in Wound Size % 78.71 04/07/22 1017   Wound Volume (cm^3) 107.1 cm^3 04/07/22 1017   Wound Healing % 90 04/07/22 1017   Post-Procedure Length (cm) 12.5 cm 03/03/22 0846   Post-Procedure Width (cm) 11.5 cm 03/03/22 0846   Post-Procedure Depth (cm) 7.5 cm 03/03/22 0846   Post-Procedure Surface Area (cm^2) 143.75 cm^2 03/03/22 0846   Post-Procedure Volume (cm^3) 1078.125 cm^3 03/03/22 0846   Undermining Starts ___ O'Clock 9 o'clock 04/07/22 1017   Undermining Ends ___ O'Clock 2 o'clock 04/07/22 1017   Undermining Maximum Distance (cm) 5.6 cm 04/07/22 1017   Wound Assessment Granulation tissue 04/07/22 1017   Drainage Amount Moderate 04/07/22 1017   Drainage Description Serosanguinous 04/07/22 1017   Wound Odor None 04/07/22 1017   Michaela-Wound/Incision Assessment Intact 04/07/22 1017   Wound Thickness Description Full thickness 04/07/22 1017   Number of days: 35       Incision 08/23/21 Arm Left (Active)   Number of days: 227       Incision 12/01/21 Arm Right (Active)   Number of days: 127        Assessment:   Stage IV sacral decubitus  Patient Active Problem List   Diagnosis Code    Obesity, morbid ([address] E66.01    Stage 4 skin ulcer of sacral region [facility]) L98.429    Other specified anemias D64.89    Fecal incontinence R15.9    C. difficile colitis A04.72     Plan:   Silver nitrate was applied to the hypertrophic granulation tissue. Plan to continue with saline dressing changes and pressure offloading, encourage nutritional supplementation. Follow-up with Dr. Evon Bliss in 2 weeks. Follow-up Information    None           Thank you very much for the opportunity to participate in this patient's care. Signed By: David Earl MD     April 7, 2022        TIME:  If total time for today's E/M service is used for level of service it is documented below. This time includes physician non-face-to-face service time visit on the date of service and includes    Preparing to see the patient (eg, review of tests)  Obtaining and/or reviewing separately obtained history  Performing a medically necessary appropriate examination and/or evaluation  Counseling and educating the patient/family/caregiver  Ordering medications, tests, or procedures  Referring and communicating with other health care professionals as needed  Documenting clinical information in the electronic or other health record  Independently interpreting results (not reported separately) and communicating results to the patient/family/caregiver  Care coordination (not reported separately)    E/M time =     20   minutes.

## 2022-04-07 NOTE — WOUND CARE
[address]  [address], [address]   Phone: [phone]  Fax: [phone]    Patient: Edinson Meraz MRN: [id number]  SSN: [id number]    YOB: 1950  Age: 66 y.o. Sex: female       Return Appointment: 2 weeks with Kisha English MD    Instructions: Sacral Wound:  Cleanse wound with normal saline. Apply saline wet-to-dry gauze to wound bed. Cover with ABD pads and paper tape. Change 2 times a day and whenever soiled.     Silver nitrate used today.     Home health for dressing changes 3 times weekl    Should you experience increased redness, swelling, pain, foul odor, size of wound(s), or have a temperature over 101 degrees please contact the [address] [place] [address] Road at [phone] or if after hours contact your primary care physician or go to the hospital emergency department.     Signed By: Kasandra Jacob     April 7, 2022

## 2022-04-21 ENCOUNTER — HOSPITAL ENCOUNTER (OUTPATIENT)
Dept: WOUND CARE | Age: 72
Discharge: HOME | End: 2022-04-21
Attending: SURGERY
Payer: COMMERCIAL

## 2022-04-21 VITALS
SYSTOLIC BLOOD PRESSURE: 158 MMHG | TEMPERATURE: 99.6 F | WEIGHT: 169.2 LBS | HEIGHT: 64 IN | BODY MASS INDEX: 28.89 KG/M2 | DIASTOLIC BLOOD PRESSURE: 68 MMHG | HEART RATE: 54 BPM

## 2022-04-21 DIAGNOSIS — E66.01 OBESITY, MORBID (HCC): Primary | ICD-10-CM

## 2022-04-21 DIAGNOSIS — L98.429 STAGE 4 SKIN ULCER OF SACRAL REGION (HCC): ICD-10-CM

## 2022-04-21 PROCEDURE — 99213 OFFICE O/P EST LOW 20 MIN: CPT

## 2022-04-21 PROCEDURE — 99214 OFFICE O/P EST MOD 30 MIN: CPT | Performed by: SURGERY

## 2022-04-21 PROCEDURE — 99211 OFF/OP EST MAY X REQ PHY/QHP: CPT

## 2022-04-21 RX ORDER — LIDOCAINE 50 MG/G
OINTMENT TOPICAL ONCE
OUTPATIENT
Start: 2022-04-21 | End: 2022-04-21

## 2022-04-21 RX ORDER — LIDOCAINE 40 MG/G
CREAM TOPICAL ONCE
OUTPATIENT
Start: 2022-04-21 | End: 2022-04-21

## 2022-04-21 RX ORDER — LIDOCAINE HYDROCHLORIDE 20 MG/ML
5 SOLUTION OROPHARYNGEAL ONCE
OUTPATIENT
Start: 2022-04-21 | End: 2022-04-21

## 2022-04-21 RX ORDER — LIDOCAINE HYDROCHLORIDE 20 MG/ML
JELLY TOPICAL ONCE
OUTPATIENT
Start: 2022-04-21 | End: 2022-04-21

## 2022-04-21 RX ORDER — TRIAMCINOLONE ACETONIDE 1 MG/G
OINTMENT TOPICAL ONCE
OUTPATIENT
Start: 2022-04-21 | End: 2022-04-21

## 2022-04-21 RX ORDER — MUPIROCIN 20 MG/G
OINTMENT TOPICAL ONCE
OUTPATIENT
Start: 2022-04-21 | End: 2022-04-21

## 2022-04-21 RX ORDER — LIDOCAINE HYDROCHLORIDE 40 MG/ML
SOLUTION TOPICAL ONCE
OUTPATIENT
Start: 2022-04-21 | End: 2022-04-21

## 2022-04-21 RX ORDER — CLOBETASOL PROPIONATE 0.5 MG/G
OINTMENT TOPICAL ONCE
OUTPATIENT
Start: 2022-04-21 | End: 2022-04-21

## 2022-04-21 RX ORDER — BETAMETHASONE DIPROPIONATE 0.5 MG/G
OINTMENT TOPICAL ONCE
OUTPATIENT
Start: 2022-04-21 | End: 2022-04-21

## 2022-04-21 RX ORDER — SILVER SULFADIAZINE 10 G/1000G
CREAM TOPICAL ONCE
OUTPATIENT
Start: 2022-04-21 | End: 2022-04-21

## 2022-04-21 RX ORDER — GENTAMICIN SULFATE 1 MG/G
OINTMENT TOPICAL ONCE
OUTPATIENT
Start: 2022-04-21 | End: 2022-04-21

## 2022-04-21 RX ORDER — BACITRACIN 500 [USP'U]/G
OINTMENT TOPICAL ONCE
OUTPATIENT
Start: 2022-04-21 | End: 2022-04-21

## 2022-04-21 RX ORDER — BACITRACIN ZINC AND POLYMYXIN B SULFATE 500; 1000 [USP'U]/G; [USP'U]/G
OINTMENT TOPICAL ONCE
OUTPATIENT
Start: 2022-04-21 | End: 2022-04-21

## 2022-04-21 NOTE — WOUND CARE
04/21/22 1056   Wound Sacrum   Date First Assessed/Time First Assessed: 03/03/22 0838   Present on Hospital Admission: Yes  Wound Approximate Age at First Assessment (Weeks): 10 weeks  Primary Wound Type: Pressure Injury  Location: Sacrum   Wound Image    Wound Etiology Pressure Stage 4   Dressing Status Clean;Dry; Intact   Cleansed Cleansed with saline   Dressing/Treatment ABD pad;Gauze dressing/dressing sponge   Wound Length (cm) 8.5 cm   Wound Width (cm) 7.5 cm   Wound Depth (cm) 0.1 cm   Wound Surface Area (cm^2) 63.75 cm^2   Change in Wound Size % 55.65   Wound Volume (cm^3) 6.375 cm^3   Wound Healing % 99   Distance Tunneling (cm) 1 cm   Direction of Tunnel 2 o'clock   Undermining Starts ___ O'Clock 9 o'clock   Undermining Ends ___ O'Clock 1 o'clock   Undermining Maximum Distance (cm) 4.3 cm   Wound Assessment Granulation tissue   Drainage Amount Moderate   Drainage Description Serosanguinous   Wound Odor None   Michaela-Wound/Incision Assessment Intact   Wound Thickness Description Full thickness   takes ASA

## 2022-04-21 NOTE — WOUND CARE
[address]  [address], [address] [facility]  Phone: [phone]  Fax: [phone]    Patient: Florence Juan MRN: [id number]  SSN: [id number]    YOB: 1950  Age: 66 y.o. Sex: female       Return Appointment: 4 weeks  with Toby Duckworth MD    Instructions: Sacral Wound:  Cleanse wound with normal saline. Apply saline wet-to-dry gauze to wound bed. Cover with ABD pads and paper tape. Change 2 times a day and whenever soiled.     Home health for dressing changes 3 times weekl    Should you experience increased redness, swelling, pain, foul odor, size of wound(s), or have a temperature over 101 degrees please contact the [address] [place] [address] Road at [phone] or if after hours contact your primary care physician or go to the hospital emergency department.     Signed By: Kimmy Maguire RN     April 21, 2022

## 2022-04-21 NOTE — PROGRESS NOTES
[address]  Consult Note/H&P/Progress Note      [facility]:  [id number]  AGE: 66 y.o. RACE BLACK/  GENDER: female  : 1950  EPISODE DATE:  2022       Subjective:      CC (Chief Complaint) and HISTORY of PRESENT ILLNESS (HPI):    Alix York is a 66 y.o. female who presents today for wound/ulcer evaluation. She came in for her first visit with us on 3/3/2022. She is seeing us for a large stage IV sacral pressure ulcer  She was having packing change performed with Dakin solution prior to wound center enrollment. She had a history of septic shock related to a dialysis catheter in early 2022. This resulted in hospitalization  She was transferred to skilled nursing facility on 2022. She is brought back to the ER on 2022 with a stage II sacral pressure ulcer which has progressed to stage IV.   She had a wound VAC at one point which could not get a seal.  She had C. difficile infection and has had a fecal management system in place for more than a month by her report           his information was obtained from the patient  The following HPI elements were documented for the patient's wound:  Location: Large stage IV sacral pressure ulcer  Duration: Since approximately early 2022  Severity: Severe  Context:    Modifying Factors:  Nothing makes better or worse  Quality:    Timing:     Associated Signs and Symptoms: No recent fevers      Ulcer Identification:  Ulcer Type: pressure    Contributing Factors: chronic pressure, decreased mobility, incontinence of stool and obesity            PAST MEDICAL HISTORY  Past Medical History:   Diagnosis Date    Anemia     Arrhythmia     patient states she has a heart murmur,Echo 10/12/2021 EF 55-65% sees [facility]    CAD (coronary artery disease)     cardiac cath, stent x 1    Cancer ([facility] [address]     uterine    Chronic kidney disease ~2018    [facility]RRD Dialysis MWF at [address]  in [place], Dialysis cath Right upper chest wall (11/2021)    Diabetes ([facility] [address]     typell, avgfbs 95, last A1C was 7.0 on 06/27/2021 s/sx of hypo below 60    Dialysis patient [facility])     M,W,F    Heart failure ([facility] [address]     Echo 10/12/2021 on Entresto 55-65%, improved from 6/27/2021 Echo 25%    Hypercholesterolemia     Hypertension     Morbid obesity ([facility] [address]     Nausea & vomiting         PAST SURGICAL HISTORY  Past Surgical History:   Procedure Laterality Date    HX COLONOSCOPY      HX DILATION AND CURETTAGE      HX HYSTERECTOMY      IR INSERT TUNL CVC W/O PORT OVER 5 YR  8/23/2021    right chest    VASCULAR SURGERY PROCEDURE UNLIST Left 05/30/2018    AVF creation    VASCULAR SURGERY PROCEDURE UNLIST Right 12/01/2021    Creation of right upper arm brachial axillary loop graft       FAMILY HISTORY  Family History   Problem Relation Age of Onset    Stroke Mother     Hypertension Mother     Heart Disease Father         MI    Hypertension Father     Heart Disease Sister     Diabetes Brother     Heart Disease Brother     Heart Disease Brother        SOCIAL HISTORY  Social History     Tobacco Use    Smoking status: Never Smoker    Smokeless tobacco: Never Used   Substance Use Topics    Alcohol use: No    Drug use: No       ALLERGIES  No Known Allergies    MEDICATIONS  Current Outpatient Medications on File Prior to Encounter   Medication Sig Dispense Refill    gabapentin (NEURONTIN) 100 mg capsule Take 100 mg by mouth daily.  lisinopriL (PRINIVIL, ZESTRIL) 40 mg tablet Take 40 mg by mouth daily.  insulin lispro (HUMALOG) 100 unit/mL injection 100 Units by SubCUTAneous route two (2) times a day and at bedtime.  traMADoL (ULTRAM) 50 mg tablet Take 50 mg by mouth every six (6) hours as needed for Pain.  b complex-vitamin c-folic acid 0.8 mg (NEPHRO-OMKAR) 0.8 mg tab tablet Take 1 Tablet by mouth daily.       insulin NPH (NovoLIN N NPH U-100 Insulin) 100 unit/mL injection by SubCUTAneous route nightly. If glucose level  greater than 150 than take 20 units  Indications: type 2 diabetes mellitus      cholecalciferol, VITAMIN D3, (VITAMIN D3) 5,000 unit tab tablet Take 5,000 Units by mouth daily.  metoprolol succinate (TOPROL-XL) 50 mg XL tablet Take 50 mg by mouth daily.  aspirin delayed-release 81 mg tablet Take  by mouth daily.  Ferrous Fumarate 325 mg (106 mg iron) tab Take  by mouth nightly. No current facility-administered medications on file prior to encounter. REVIEW OF SYSTEMS  The patient has no difficulty with chest pain or shortness of breath. No fever or chills. Comprehensive review of systems was otherwise unremarkable except as noted above. Objective:   Physical Exam:   Recent vitals (if inpt):  Patient Vitals for the past 24 hrs:   BP Temp Pulse Height Weight   04/21/22 1037 (!) 158/68 99.6 °F (37.6 °C) (!) 54 5' 4\" (1.626 m) 169 lb 3.2 oz (76.7 kg)       Visit Vitals  BP (!) 158/68 (BP 1 Location: Right upper arm, BP Patient Position: At rest)   Pulse (!) 54   Temp 99.6 °F (37.6 °C)   Ht 5' 4\" (1.626 m)   Wt 169 lb 3.2 oz (76.7 kg)   BMI 29.04 kg/m²     Wt Readings from Last 3 Encounters:   04/21/22 169 lb 3.2 oz (76.7 kg)   04/07/22 168 lb 6.4 oz (76.4 kg)   03/24/22 172 lb 6.4 oz (78.2 kg)     Constitutional: Alert, oriented, cooperative patient in no acute distress; appears stated age;  General appearance is within normal limits for wound care patient population. See the today's recorded vitals signs and constitutional data. Eyes: Pupils equal; Sclera are clear. EOMs intact; The eyes appear to track and move normally. The sclera are not injected. The conjunctive are clear. The eyelids are normal. There is no scleral icterus. ENMT: There are no obvious external ear, nose, lip or mouth lesions. Nares normal; Neck: Overall contour of the neck is normal with no obvious neck masses.  Jeremy Aguilar hearing is within normal limits. No obvious neck masses  Resp:  Breathing is non-labored; normal rate and effort; no audible wheezing. CV: RRR;  no JVD; No evidence of cyanosis of the upper extremities. The extremities are perfused without embolic sign, splinter hemorrhages, or petechia. GI: soft and non-distended without acute abnormality noted. Musculoskeletal: unremarkable with normal function. No embolic signs or cyanosis. Neuro:  Oriented; moves all 4; no focal deficits  Psychiatric: Judgement and insight are within normal limits for the wound care population of patients. Patient is oriented to person, place, and time. Recent and remote memory are within normal limits. Mood and affect are within normal limits. Integumentary (Skin/Wounds)  See inspection of wound(s) below. There are no other skin areas of palpable concern. See wound center documentation including photos in the [address] Wound Template made part of this record by reference. Wounds:  Wound Arm Left;Mid (Active)   Number of days: 1422       Wound Arm Left (Active)   Number of days: 1361       Wound Sacrum (Active)   Wound Image   04/21/22 1056   Wound Etiology Pressure Stage 4 04/21/22 1056   Dressing Status Clean;Dry; Intact 04/21/22 1056   Cleansed Cleansed with saline 04/21/22 1056   Dressing/Treatment ABD pad;Gauze dressing/dressing sponge 04/21/22 1056   Wound Length (cm) 8.5 cm 04/21/22 1056   Wound Width (cm) 7.5 cm 04/21/22 1056   Wound Depth (cm) 0.1 cm 04/21/22 1056   Wound Surface Area (cm^2) 63.75 cm^2 04/21/22 1056   Change in Wound Size % 55.65 04/21/22 1056   Wound Volume (cm^3) 6.375 cm^3 04/21/22 1056   Wound Healing % 99 04/21/22 1056   Post-Procedure Length (cm) 12.5 cm 03/03/22 0846   Post-Procedure Width (cm) 11.5 cm 03/03/22 0846   Post-Procedure Depth (cm) 7.5 cm 03/03/22 0846   Post-Procedure Surface Area (cm^2) 143.75 cm^2 03/03/22 0846   Post-Procedure Volume (cm^3) 0940.408 cm^3 03/03/22 9245   Distance Tunneling (cm) 1 cm 04/21/22 1056   Direction of Tunnel 2 o'clock 04/21/22 1056   Undermining Starts ___ O'Clock 9 o'clock 04/21/22 1056   Undermining Ends ___ O'Clock 1 o'clock 04/21/22 1056   Undermining Maximum Distance (cm) 4.3 cm 04/21/22 1056   Wound Assessment Granulation tissue 04/21/22 1056   Drainage Amount Moderate 04/21/22 1056   Drainage Description Serosanguinous 04/21/22 1056   Wound Odor None 04/21/22 1056   Michaela-Wound/Incision Assessment Intact 04/21/22 1056   Wound Thickness Description Full thickness 04/21/22 1056   Number of days: 49       Incision 08/23/21 Arm Left (Active)   Number of days: 241       Incision 12/01/21 Arm Right (Active)   Number of days: 141                      Amount and/or Complexity of Data Reviewed and Analyzed:  I reviewed and analyzed all of the unique labs and radiologic studies that are shown below as well as any that are in the HPI, and any that are in the expanded problem list below  *Each unique test, order, or document contributes to the combination of 2 or combination of 3 in Category 1 below. I also independently reviewed radiology images for studies I described above in the HPI or studies I have ordered. For this visit I also reviewed old records and prior notes. No results for input(s): WBC, HGB, PLT, NA, K, CL, CO2, BUN, CREA, GLU, PTP, INR, APTT, TBIL, TBILI, CBIL, ALT, AP, AML, AML, LPSE, LCAD, NH4, TROPT, TROIQ, PCO2, PO2, HCO3, HGBEXT, PLTEXT, INREXT, HGBEXT, PLTEXT, INREXT in the last 72 hours. No lab exists for component: SGOT, GPT,  PH  No results for input(s): PTP, INR, APTT, TBIL, TBILI, CBIL, ALT, AP, AML, LPSE, INREXT, INREXT in the last 72 hours.     No lab exists for component: SGOT, GPT    Most recent blood counts (CBC) review  Lab Results   Component Value Date/Time    WBC 9.6 02/03/2022 10:28 PM    HGB 6.7 (LL) 02/03/2022 10:28 PM    HCT 21.7 (L) 02/03/2022 10:28 PM    PLATELET 889 88/23/8967 10:28 PM    MCV 90.0 02/03/2022 10:28 PM     Most recent chemistry (BMP) test review   Lab Results   Component Value Date/Time    Sodium 138 08/23/2021 11:01 AM    Potassium 4.7 08/23/2021 11:01 AM    Chloride 102 08/23/2021 11:01 AM    CO2 27 08/23/2021 11:01 AM    Anion gap 9 08/23/2021 11:01 AM    Glucose 162 (H) 08/23/2021 11:01 AM    BUN 64 (H) 08/23/2021 11:01 AM    Creatinine 10.40 (H) 08/23/2021 11:01 AM    GFR est AA 5 (L) 08/23/2021 11:01 AM    GFR est non-AA 4 (L) 08/23/2021 11:01 AM    Calcium 8.3 08/23/2021 11:01 AM     Most recent Liver enzyme test review  No results found for: ALT, AST, GGT, GGTP, AP, APIT, APX, CBIL, TBIL, TBILI  Most recent coagulation (coags) review  No results found for: INR, PTMR, PTP, PT1, PT2, INREXT, INREXT  No results found for: INR, APTT, CBIL, AML, AML, LPSE, LCAD, NH4, TROPT, TROIQ, INREXT, INREXT    Diabetic assessment  No results found for: HBA1C, ZBE4JVVR, GXP3OLPP, WIS0YARY    Nutritional assessment screen to assess wound healing ability:  No results found for: TP, ALBR, TALB, ALB  No results found for: TP, ALBR, TALB, ALB    XR Results (most recent):  Results from Hospital Encounter encounter on 08/23/21    XR FLUOROSCOPY UNDER 60 MINUTES    Narrative  Administrative Report    Fluoroscopy was provided in the operating room. Images were saved as a reference  for the surgical procedure. The operative report can be viewed in [address] [place] [address]  and/or retrieved by the Medical Records department. A Radiologist has not reviewed these images. CT Results (most recent):  No results found for this or any previous visit.         Admission date (for inpatients): 4/21/2022   * No surgery found *  * No surgery found *    Assessment:      Problem List Items Addressed This Visit     Obesity, morbid ([facility] [address]    Stage 4 skin ulcer of sacral region [facility])          Problem List  Date Reviewed: 4/7/2022          Codes Class Noted    Stage 4 skin ulcer of sacral region [facility]) ICD-10-CM: I46.987  ICD-9-CM: 707.8 3/3/2022        Other specified anemias ICD-10-CM: D64.89  ICD-9-CM: 285.8  3/3/2022        Fecal incontinence ICD-10-CM: R15.9  ICD-9-CM: 787.60  3/3/2022        C. difficile colitis ICD-10-CM: A04.72  ICD-9-CM: 008.45  3/3/2022        Obesity, morbid ([address] ICD-10-CM: E66.01  ICD-9-CM: 278.01  5/23/2018              Active Problems:    * No active hospital problems. *          Plan:     Number and Complexity of Problems addressed and   Risks of complications and/or morbidity of management    Treatment Note please see attached Discharge Instructions  Any procedures done today in the wound center (if documented in a separate note) in [facility] are made part of this record by reference  Written patient dismissal instructions given to patient or POA. Large stage IV sacral pressure ulcer  I sharply and selectively debrided this with a #15 scalpel on 3/3/2022. Stop Dakin's solution and initiation of saline moistened gauze Kerlix packing into the sacral wound BID + after each bowel movement  I previously recommended discontinuation of fecal management system as it has been in place for >1mo and she is having formed stool coming out around the catheter. Recommend nutritional consultation for high-protein supplements  Recommend level 2 low air loss mattress. Recommend frequent turning side to side. See us in 3-4 weeks or sooner if needed    Anemia   Hgb 6.7 on 2/3/22  Follow-up LMD                Wound Care  No orders of the defined types were placed in this encounter. Follow-up Information    None                 Level of MDM (2/3 elements below)  Number and Complexity of Problems Addressed Amount and/or Complexity of Data to be Reviewed and Analyzed  *Each unique test, order, or document contributes to the combination of 2 or combination of 3 in Category 1 below.  Risk of Complications and/or Morbidity or Mortality of pt Management     26682  82612 SF Minimal  1self-limited or minor problem Minimal or none Minimal risk of morbidity from additional diagnostic testing or Rx   94323  47303 Low Low  2or more self-limited or minor problems;    or  1stable chronic illness;    or  3SCXOC, uncomplicated illness or injury   Limited  (Must meet the requirements of at least 1 of the 2 categories)  Category 1: Tests and documents   Any combination of 2 from the following:  Review of prior external note(s) from each unique source*;  review of the result(s) of each unique test*;   ordering of each unique test*    or   Category 2: Assessment requiring an independent historian(s)  (For the categories of independent interpretation of tests and discussion of management or test interpretation, see moderate or high) Low risk of morbidity from additional diagnostic testing or treatment     19794  87832 Mod Moderate  1or more chronic illnesses with exacerbation, progression, or side effects of treatment;    or  2or more stable chronic illnesses;    or  1undiagnosed new problem with uncertain prognosis;    or  1acute illness with systemic symptoms;    or  8DBRLC complicated injury   Moderate  (Must meet the requirements of at least 1 out of 3 categories)  Category 1: Tests, documents, or independent historian(s)  Any combination of 3 from the following:   Review of prior external note(s) from each unique source*;  Review of the result(s) of each unique test*;  Ordering of each unique test*;  Assessment requiring an independent historian(s)    or  Category 2: Independent interpretation of tests   Independent interpretation of a test performed by another physician/other qualified health care professional (not separately reported);     or  Category 3: Discussion of management or test interpretation  Discussion of management or test interpretation with external physician/other qualified health care professional/appropriate source (not separately reported)   Moderate risk of morbidity from additional diagnostic testing or treatment  Examples only:  Prescription drug management   Decision regarding minor surgery with identified patient or procedure risk factors  Decision regarding elective major surgery without identified patient or procedure risk factors   Diagnosis or treatment significantly limited by social determinants of health       98257  01364 High High  1or more chronic illnesses with severe exacerbation, progression, or side effects of treatment;    or  1 acute or chronic illness or injury that poses a threat to life or bodily function   Extensive  (Must meet the requirements of at least 2 out of 3 categories)  Category 1: Tests, documents, or independent historian(s)  Any combination of 3 from the following:   Review of prior external note(s) from each unique source*;  Review of the result(s) of each unique test*;   Ordering of each unique test*;   Assessment requiring an independent historian(s)    or   Category 2: Independent interpretation of tests   Independent interpretation of a test performed by another physician/other qualified health care professional (not separately reported);     or  Category 3: Discussion of management or test interpretation  Discussion of management or test interpretation with external physician/other qualified health care professional/appropriate source (not separately reported)   High risk of morbidity from additional diagnostic testing or treatment  Examples only:  Drug therapy requiring intensive monitoring for toxicity  Decision regarding elective major surgery with identified patient or procedure risk factors  Decision regarding emergency major surgery  Decision regarding hospitalization  Decision not to resuscitate or to de-escalate care because of poor prognosis                 I have personally performed a face-to-face diagnostic evaluation and management  service on this patient. I have independently seen the patient.    I have independently obtained the above history from the patient/family. I have independently examined the patient with above findings. I have independently reviewed data/labs for this patient and developed the above plan of care (MDM).   Electronically signed by Kiran Garcia MD on 4/21/2022 at 7:59 AM

## 2022-04-21 NOTE — WOUND CARE
04/21/22 1056   Wound Sacrum   Date First Assessed/Time First Assessed: 03/03/22 0838   Present on Hospital Admission: Yes  Wound Approximate Age at First Assessment (Weeks): 10 weeks  Primary Wound Type: Pressure Injury  Location: Sacrum   Wound Image    Wound Etiology Pressure Stage 4   Dressing Status Clean;Dry; Intact   Cleansed Cleansed with saline   Dressing/Treatment ABD pad;Gauze dressing/dressing sponge   Wound Length (cm) 8.5 cm   Wound Width (cm) 7.5 cm   Wound Depth (cm) 0.1 cm   Wound Surface Area (cm^2) 63.75 cm^2   Change in Wound Size % 55.65   Wound Volume (cm^3) 6.375 cm^3   Wound Healing % 99   Distance Tunneling (cm) 1 cm   Direction of Tunnel 2 o'clock   Undermining Starts ___ O'Clock 9 o'clock   Undermining Ends ___ O'Clock 1 o'clock   Undermining Maximum Distance (cm) 4.3 cm   Wound Assessment Granulation tissue   Drainage Amount Moderate   Drainage Description Serosanguinous   Wound Odor None   Michaela-Wound/Incision Assessment Intact   Wound Thickness Description Full thickness   patient on ASA

## 2022-05-19 ENCOUNTER — HOSPITAL ENCOUNTER (OUTPATIENT)
Dept: WOUND CARE | Age: 72
Discharge: HOME | End: 2022-05-19
Attending: SURGERY
Payer: COMMERCIAL

## 2022-05-19 VITALS
SYSTOLIC BLOOD PRESSURE: 160 MMHG | TEMPERATURE: 98.8 F | OXYGEN SATURATION: 98 % | BODY MASS INDEX: 28.51 KG/M2 | HEART RATE: 52 BPM | WEIGHT: 167 LBS | HEIGHT: 64 IN | DIASTOLIC BLOOD PRESSURE: 62 MMHG | RESPIRATION RATE: 18 BRPM

## 2022-05-19 DIAGNOSIS — L98.429 STAGE 4 SKIN ULCER OF SACRAL REGION (HCC): ICD-10-CM

## 2022-05-19 DIAGNOSIS — E66.01 OBESITY, MORBID (HCC): Primary | ICD-10-CM

## 2022-05-19 PROCEDURE — 99213 OFFICE O/P EST LOW 20 MIN: CPT

## 2022-05-19 PROCEDURE — 99214 OFFICE O/P EST MOD 30 MIN: CPT | Performed by: SURGERY

## 2022-05-19 RX ORDER — LIDOCAINE 40 MG/G
CREAM TOPICAL ONCE
OUTPATIENT
Start: 2022-05-19 | End: 2022-05-19

## 2022-05-19 RX ORDER — GENTAMICIN SULFATE 1 MG/G
OINTMENT TOPICAL ONCE
OUTPATIENT
Start: 2022-05-19 | End: 2022-05-19

## 2022-05-19 RX ORDER — BACITRACIN ZINC AND POLYMYXIN B SULFATE 500; 1000 [USP'U]/G; [USP'U]/G
OINTMENT TOPICAL ONCE
OUTPATIENT
Start: 2022-05-19 | End: 2022-05-19

## 2022-05-19 RX ORDER — CLOBETASOL PROPIONATE 0.5 MG/G
OINTMENT TOPICAL ONCE
OUTPATIENT
Start: 2022-05-19 | End: 2022-05-19

## 2022-05-19 RX ORDER — LIDOCAINE HYDROCHLORIDE 40 MG/ML
SOLUTION TOPICAL ONCE
OUTPATIENT
Start: 2022-05-19 | End: 2022-05-19

## 2022-05-19 RX ORDER — TRIAMCINOLONE ACETONIDE 1 MG/G
OINTMENT TOPICAL ONCE
OUTPATIENT
Start: 2022-05-19 | End: 2022-05-19

## 2022-05-19 RX ORDER — LIDOCAINE HYDROCHLORIDE 20 MG/ML
JELLY TOPICAL ONCE
OUTPATIENT
Start: 2022-05-19 | End: 2022-05-19

## 2022-05-19 RX ORDER — LIDOCAINE HYDROCHLORIDE 20 MG/ML
5 SOLUTION OROPHARYNGEAL ONCE
OUTPATIENT
Start: 2022-05-19 | End: 2022-05-19

## 2022-05-19 RX ORDER — BETAMETHASONE DIPROPIONATE 0.5 MG/G
OINTMENT TOPICAL ONCE
OUTPATIENT
Start: 2022-05-19 | End: 2022-05-19

## 2022-05-19 RX ORDER — LIDOCAINE 50 MG/G
OINTMENT TOPICAL ONCE
OUTPATIENT
Start: 2022-05-19 | End: 2022-05-19

## 2022-05-19 RX ORDER — MUPIROCIN 20 MG/G
OINTMENT TOPICAL ONCE
OUTPATIENT
Start: 2022-05-19 | End: 2022-05-19

## 2022-05-19 RX ORDER — BACITRACIN 500 [USP'U]/G
OINTMENT TOPICAL ONCE
OUTPATIENT
Start: 2022-05-19 | End: 2022-05-19

## 2022-05-19 RX ORDER — SILVER SULFADIAZINE 10 G/1000G
CREAM TOPICAL ONCE
OUTPATIENT
Start: 2022-05-19 | End: 2022-05-19

## 2022-05-19 NOTE — PROGRESS NOTES
[address]  Consult Note/H&P/Progress Note      [facility] REC[facility] NUM[facility]:  [id number]  AGE: 66 y.o. RACE BLACK/  GENDER: female  : 1950  EPISODE DATE:  2022       Subjective:      CC (Chief Complaint) and HISTORY of PRESENT ILLNESS (HPI):    Salvador Martinez is a 66 y.o. female who presents today for wound/ulcer evaluation. She came in for her first visit with us on 3/3/2022. She is seeing us for a large stage IV sacral pressure ulcer  She was having packing change performed with Dakin solution prior to wound center enrollment. She had a history of septic shock related to a dialysis catheter in early 2022. This resulted in hospitalization  She was transferred to skilled nursing facility on 2022. She is brought back to the ER on 2022 with a stage II sacral pressure ulcer which has progressed to stage IV.   She had a wound VAC at one point which could not get a seal.  She had C. difficile infection and has had a fecal management system in place for more than a month by her report           his information was obtained from the patient  The following HPI elements were documented for the patient's wound:  Location: Large stage IV sacral pressure ulcer  Duration: Since approximately early 2022  Severity: Severe  Context:    Modifying Factors:  Nothing makes better or worse  Quality:    Timing:     Associated Signs and Symptoms: No recent fevers      Ulcer Identification:  Ulcer Type: pressure    Contributing Factors: chronic pressure, decreased mobility, incontinence of stool and obesity            PAST MEDICAL HISTORY  Past Medical History:   Diagnosis Date    Anemia     Arrhythmia     patient states she has a heart murmur,Echo 10/12/2021 EF 55-65% sees [facility]    CAD (coronary artery disease)     cardiac cath, stent x 1    Cancer ([facility] [facility]     uterine    Chronic kidney disease ~2018    [facility] Dialysis MWF at [address]  in [place], Dialysis cath Right upper chest wall (11/2021)    Diabetes ([facility] [address]     typell, avgfbs 95, last A1C was 7.0 on 06/27/2021 s/sx of hypo below 60    Dialysis patient [facility])     M,W,F    Heart failure ([facility] [address]     Echo 10/12/2021 on Entresto 55-65%, improved from 6/27/2021 Echo 25%    Hypercholesterolemia     Hypertension     Morbid obesity ([facility] [address]     Nausea & vomiting         PAST SURGICAL HISTORY  Past Surgical History:   Procedure Laterality Date    HX COLONOSCOPY      HX DILATION AND CURETTAGE      HX HYSTERECTOMY      IR INSERT TUNL CVC W/O PORT OVER 5 YR  8/23/2021    right chest    VASCULAR SURGERY PROCEDURE UNLIST Left 05/30/2018    AVF creation    VASCULAR SURGERY PROCEDURE UNLIST Right 12/01/2021    Creation of right upper arm brachial axillary loop graft       FAMILY HISTORY  Family History   Problem Relation Age of Onset    Stroke Mother     Hypertension Mother     Heart Disease Father         MI    Hypertension Father     Heart Disease Sister     Diabetes Brother     Heart Disease Brother     Heart Disease Brother        SOCIAL HISTORY  Social History     Tobacco Use    Smoking status: Never Smoker    Smokeless tobacco: Never Used   Substance Use Topics    Alcohol use: No    Drug use: No       ALLERGIES  No Known Allergies    MEDICATIONS  Current Outpatient Medications on File Prior to Encounter   Medication Sig Dispense Refill    gabapentin (NEURONTIN) 100 mg capsule Take 100 mg by mouth daily.  lisinopriL (PRINIVIL, ZESTRIL) 40 mg tablet Take 40 mg by mouth daily.  insulin lispro (HUMALOG) 100 unit/mL injection 100 Units by SubCUTAneous route two (2) times a day and at bedtime.  traMADoL (ULTRAM) 50 mg tablet Take 50 mg by mouth every six (6) hours as needed for Pain.  b complex-vitamin c-folic acid 0.8 mg (NEPHRO-OMKAR) 0.8 mg tab tablet Take 1 Tablet by mouth daily.       insulin NPH (NovoLIN N NPH U-100 Insulin) 100 unit/mL injection by SubCUTAneous route nightly. If glucose level  greater than 150 than take 20 units  Indications: type 2 diabetes mellitus      cholecalciferol, VITAMIN D3, (VITAMIN D3) 5,000 unit tab tablet Take 5,000 Units by mouth daily.  metoprolol succinate (TOPROL-XL) 50 mg XL tablet Take 50 mg by mouth daily.  aspirin delayed-release 81 mg tablet Take  by mouth daily.  Ferrous Fumarate 325 mg (106 mg iron) tab Take  by mouth nightly. No current facility-administered medications on file prior to encounter. REVIEW OF SYSTEMS  The patient has no difficulty with chest pain or shortness of breath. No fever or chills. Comprehensive review of systems was otherwise unremarkable except as noted above. Objective:   Physical Exam:   Recent vitals (if inpt):  Patient Vitals for the past 24 hrs:   BP Temp Pulse Resp SpO2 Height Weight   05/19/22 1020 (!) 160/62 98.8 °F (37.1 °C) (!) 52 18 98 % -- --   05/19/22 1008 -- -- -- -- -- 5' 4\" (1.626 m) 167 lb (75.8 kg)       Visit Vitals  BP (!) 160/62 (BP 1 Location: Right upper arm, BP Patient Position: Sitting)   Pulse (!) 52   Temp 98.8 °F (37.1 °C)   Resp 18   Ht 5' 4\" (1.626 m)   Wt 167 lb (75.8 kg)   SpO2 98%   BMI 28.67 kg/m²     Wt Readings from Last 3 Encounters:   05/19/22 167 lb (75.8 kg)   04/21/22 169 lb 3.2 oz (76.7 kg)   04/07/22 168 lb 6.4 oz (76.4 kg)     Constitutional: Alert, oriented, cooperative patient in no acute distress; appears stated age;  General appearance is within normal limits for wound care patient population. See the today's recorded vitals signs and constitutional data. Eyes: Pupils equal; Sclera are clear. EOMs intact; The eyes appear to track and move normally. The sclera are not injected. The conjunctive are clear. The eyelids are normal. There is no scleral icterus. ENMT: There are no obvious external ear, nose, lip or mouth lesions.  Nares normal; Neck: Overall contour of the neck is normal with no obvious neck masses. Gross hearing is within normal limits. No obvious neck masses  Resp:  Breathing is non-labored; normal rate and effort; no audible wheezing. CV: RRR;  no JVD; No evidence of cyanosis of the upper extremities. The extremities are perfused without embolic sign, splinter hemorrhages, or petechia. GI: soft and non-distended without acute abnormality noted. Musculoskeletal: unremarkable with normal function. No embolic signs or cyanosis. Neuro:  Oriented; moves all 4; no focal deficits  Psychiatric: Judgement and insight are within normal limits for the wound care population of patients. Patient is oriented to person, place, and time. Recent and remote memory are within normal limits. Mood and affect are within normal limits. Integumentary (Skin/Wounds)  See inspection of wound(s) below. There are no other skin areas of palpable concern. See wound center documentation including photos in the [address] Wound Template made part of this record by reference.      Wounds:  Wound Arm Left;Mid (Active)   Number of days: 1450       Wound Arm Left (Active)   Number of days: 1389       Wound Sacrum (Active)   Wound Image   05/19/22 1022   Wound Etiology Pressure Stage 4 05/19/22 1022   Dressing Status Old drainage noted 05/19/22 1022   Cleansed Cleansed with saline 05/19/22 1022   Dressing/Treatment ABD pad;Gauze dressing/dressing sponge 05/19/22 1022   Wound Length (cm) 5 cm 05/19/22 1022   Wound Width (cm) 3 cm 05/19/22 1022   Wound Depth (cm) 1 cm 05/19/22 1022   Wound Surface Area (cm^2) 15 cm^2 05/19/22 1022   Change in Wound Size % 89.57 05/19/22 1022   Wound Volume (cm^3) 15 cm^3 05/19/22 1022   Wound Healing % 99 05/19/22 1022   Post-Procedure Length (cm) 12.5 cm 03/03/22 0846   Post-Procedure Width (cm) 11.5 cm 03/03/22 0846   Post-Procedure Depth (cm) 7.5 cm 03/03/22 0846   Post-Procedure Surface Area (cm^2) 143.75 cm^2 03/03/22 0846 Post-Procedure Volume (cm^3) 1078.125 cm^3 03/03/22 0846   Distance Tunneling (cm) 1 cm 04/21/22 1056   Direction of Tunnel 2 o'clock 04/21/22 1056   Undermining Starts ___ O'Clock 8 o'clock 05/19/22 1022   Undermining Ends ___ O'Clock 11 o'clock 05/19/22 1022   Undermining Maximum Distance (cm) 3.5 cm 05/19/22 1022   Wound Assessment Granulation tissue 05/19/22 1022   Drainage Amount Moderate 05/19/22 1022   Drainage Description Serosanguinous 05/19/22 1022   Wound Odor None 05/19/22 1022   Michaela-Wound/Incision Assessment Intact 05/19/22 1022   Wound Thickness Description Full thickness 05/19/22 1022   Number of days: 77       Incision 08/23/21 Arm Left (Active)   Number of days: 269       Incision 12/01/21 Arm Right (Active)   Number of days: 169                        Amount and/or Complexity of Data Reviewed and Analyzed:  I reviewed and analyzed all of the unique labs and radiologic studies that are shown below as well as any that are in the HPI, and any that are in the expanded problem list below  *Each unique test, order, or document contributes to the combination of 2 or combination of 3 in Category 1 below. I also independently reviewed radiology images for studies I described above in the HPI or studies I have ordered. For this visit I also reviewed old records and prior notes. No results for input(s): WBC, HGB, PLT, NA, K, CL, CO2, BUN, CREA, GLU, PTP, INR, APTT, TBIL, TBILI, CBIL, ALT, AP, AML, AML, LPSE, LCAD, NH4, TROPT, TROIQ, PCO2, PO2, HCO3, HGBEXT, PLTEXT, INREXT, HGBEXT, PLTEXT, INREXT in the last 72 hours. No lab exists for component: SGOT, GPT,  PH  No results for input(s): PTP, INR, APTT, TBIL, TBILI, CBIL, ALT, AP, AML, LPSE, INREXT, INREXT in the last 72 hours.     No lab exists for component: SGOT, GPT    Most recent blood counts (CBC) review  Lab Results   Component Value Date/Time    WBC 9.6 02/03/2022 10:28 PM    HGB 6.7 (LL) 02/03/2022 10:28 PM    HCT 21.7 (L) 02/03/2022 10:28 PM    PLATELET 208 77/07/6887 10:28 PM    MCV 90.0 02/03/2022 10:28 PM     Most recent chemistry (BMP) test review   Lab Results   Component Value Date/Time    Sodium 138 08/23/2021 11:01 AM    Potassium 4.7 08/23/2021 11:01 AM    Chloride 102 08/23/2021 11:01 AM    CO2 27 08/23/2021 11:01 AM    Anion gap 9 08/23/2021 11:01 AM    Glucose 162 (H) 08/23/2021 11:01 AM    BUN 64 (H) 08/23/2021 11:01 AM    Creatinine 10.40 (H) 08/23/2021 11:01 AM    GFR est AA 5 (L) 08/23/2021 11:01 AM    GFR est non-AA 4 (L) 08/23/2021 11:01 AM    Calcium 8.3 08/23/2021 11:01 AM     Most recent Liver enzyme test review  No results found for: ALT, AST, GGT, GGTP, AP, APIT, APX, CBIL, TBIL, TBILI  Most recent coagulation (coags) review  No results found for: INR, PTMR, PTP, PT1, PT2, INREXT, INREXT  No results found for: INR, APTT, CBIL, AML, AML, LPSE, LCAD, NH4, TROPT, TROIQ, INREXT, INREXT    Diabetic assessment  No results found for: HBA1C, DNE6QZKX, HFF9RUTJ, VYF5BNJD    Nutritional assessment screen to assess wound healing ability:  No results found for: TP, ALBR, TALB, ALB  No results found for: TP, ALBR, TALB, ALB    XR Results (most recent):  Results from Hospital Encounter encounter on 08/23/21    XR FLUOROSCOPY UNDER 60 MINUTES    Narrative  Administrative Report    Fluoroscopy was provided in the operating room. Images were saved as a reference  for the surgical procedure. The operative report can be viewed in [facility]  and/or retrieved by the Medical Records department. A Radiologist has not reviewed these images. CT Results (most recent):  No results found for this or any previous visit.         Admission date (for inpatients): 5/19/2022   * No surgery found *  * No surgery found *    Assessment:      Problem List Items Addressed This Visit     Obesity, morbid ([address]    Stage 4 skin ulcer of sacral region [facility])          Problem List  Date Reviewed: 4/7/2022          Codes Class Noted    Stage 4 skin ulcer of sacral [facility] ICD-10-CM: Z30.769  ICD-9-CM: 707.8  3/3/2022        Other specified anemias ICD-10-CM: D64.89  ICD-9-CM: 285.8  3/3/2022        Fecal incontinence ICD-10-CM: R15.9  ICD-9-CM: 787.60  3/3/2022        C. difficile colitis ICD-10-CM: A04.72  ICD-9-CM: 008.45  3/3/2022        Obesity, morbid ([address] ICD-10-CM: E66.01  ICD-9-CM: 278.01  5/23/2018              Active Problems:    * No active hospital problems. *          Plan:     Number and Complexity of Problems addressed and   Risks of complications and/or morbidity of management    Treatment Note please see attached Discharge Instructions  Any procedures done today in the wound center (if documented in a separate note) in [facility] are made part of this record by reference  Written patient dismissal instructions given to patient or POA. Large stage IV sacral pressure ulcer  I sharply and selectively debrided this with a #15 scalpel on 3/3/22. Stop Dakin's solution and initiation of saline moistened gauze Kerlix packing into the sacral wound BID + after each bowel movement  I previously recommended discontinuation of fecal management system as it has been in place for >1mo and she is having formed stool coming out around the catheter. Recommend nutritional consultation for high-protein supplements  Recommend level 2 low air loss mattress. Recommend frequent turning side to side. See us in 3-4 weeks or sooner if needed      Anemia   Hgb 6.7 on 2/3/22  Follow-up LMD      BMI >28/Obesity  Encourage weight loss          Wound Care  No orders of the defined types were placed in this encounter. Follow-up Information    None                 Level of MDM (2/3 elements below)  Number and Complexity of Problems Addressed Amount and/or Complexity of Data to be Reviewed and Analyzed  *Each unique test, order, or document contributes to the combination of 2 or combination of 3 in Category 1 below.  Risk of Complications and/or Morbidity or Mortality of pt Management     79819  80840 SF Minimal  1self-limited or minor problem Minimal or none Minimal risk of morbidity from additional diagnostic testing or Rx   53968  71368 Low Low  2or more self-limited or minor problems;    or  1stable chronic illness;    or  3SKNNT, uncomplicated illness or injury   Limited  (Must meet the requirements of at least 1 of the 2 categories)  Category 1: Tests and documents   Any combination of 2 from the following:  Review of prior external note(s) from each unique source*;  review of the result(s) of each unique test*;   ordering of each unique test*    or   Category 2: Assessment requiring an independent historian(s)  (For the categories of independent interpretation of tests and discussion of management or test interpretation, see moderate or high) Low risk of morbidity from additional diagnostic testing or treatment     65579  88417 Mod Moderate  1or more chronic illnesses with exacerbation, progression, or side effects of treatment;    or  2or more stable chronic illnesses;    or  1undiagnosed new problem with uncertain prognosis;    or  1acute illness with systemic symptoms;    or  3XIJDZ complicated injury   Moderate  (Must meet the requirements of at least 1 out of 3 categories)  Category 1: Tests, documents, or independent historian(s)  Any combination of 3 from the following:   Review of prior external note(s) from each unique source*;  Review of the result(s) of each unique test*;  Ordering of each unique test*;  Assessment requiring an independent historian(s)    or  Category 2: Independent interpretation of tests   Independent interpretation of a test performed by another physician/other qualified health care professional (not separately reported);     or  Category 3: Discussion of management or test interpretation  Discussion of management or test interpretation with external physician/other qualified health care professional/appropriate source (not separately reported)   Moderate risk of morbidity from additional diagnostic testing or treatment  Examples only:  Prescription drug management   Decision regarding minor surgery with identified patient or procedure risk factors  Decision regarding elective major surgery without identified patient or procedure risk factors   Diagnosis or treatment significantly limited by social determinants of health       68163  75329 High High  1or more chronic illnesses with severe exacerbation, progression, or side effects of treatment;    or  1 acute or chronic illness or injury that poses a threat to life or bodily function   Extensive  (Must meet the requirements of at least 2 out of 3 categories)  Category 1: Tests, documents, or independent historian(s)  Any combination of 3 from the following:   Review of prior external note(s) from each unique source*;  Review of the result(s) of each unique test*;   Ordering of each unique test*;   Assessment requiring an independent historian(s)    or   Category 2: Independent interpretation of tests   Independent interpretation of a test performed by another physician/other qualified health care professional (not separately reported);     or  Category 3: Discussion of management or test interpretation  Discussion of management or test interpretation with external physician/other qualified health care professional/appropriate source (not separately reported)   High risk of morbidity from additional diagnostic testing or treatment  Examples only:  Drug therapy requiring intensive monitoring for toxicity  Decision regarding elective major surgery with identified patient or procedure risk factors  Decision regarding emergency major surgery  Decision regarding hospitalization  Decision not to resuscitate or to de-escalate care because of poor prognosis                 I have personally performed a face-to-face diagnostic evaluation and management  service on this patient. I have independently seen the patient. I have independently obtained the above history from the patient/family. I have independently examined the patient with above findings. I have independently reviewed data/labs for this patient and developed the above plan of care (MDM).   Electronically signed by Jessica Almazan MD on 5/19/2022 at 7:59 AM

## 2022-05-19 NOTE — DISCHARGE INSTRUCTIONS
Sacral Wound:  Cleanse wound with normal saline. Apply saline wet-to-dry gauze to wound bed. Cover with ABD pads and paper tape.   Change daily and whenever soiled.     Home health for dressing changes 3 times week

## 2022-05-19 NOTE — WOUND CARE
05/19/22 1022   Wound Sacrum   Date First Assessed/Time First Assessed: 03/03/22 0838   Present on Hospital Admission: Yes  Wound Approximate Age at First Assessment (Weeks): 10 weeks  Primary Wound Type: Pressure Injury  Location: Sacrum   Wound Image    Wound Etiology Pressure Stage 4   Dressing Status Old drainage noted   Cleansed Cleansed with saline   Dressing/Treatment ABD pad;Gauze dressing/dressing sponge   Wound Length (cm) 5 cm   Wound Width (cm) 3 cm   Wound Depth (cm) 1 cm   Wound Surface Area (cm^2) 15 cm^2   Change in Wound Size % 89.57   Wound Volume (cm^3) 15 cm^3   Wound Healing % 99   Undermining Starts ___ O'Clock 8 o'clock   Undermining Ends ___ O'Clock 11 o'clock   Undermining Maximum Distance (cm) 3.5 cm   Wound Assessment Granulation tissue   Drainage Amount Moderate   Drainage Description Serosanguinous   Wound Odor None   Michaela-Wound/Incision Assessment Intact   Wound Thickness Description Full thickness     Wound mechanically debrided with saline and gauze.

## 2022-05-19 NOTE — WOUND CARE
[address]  [address], [address]  Phone: [phone]  Fax: [phone]    Patient: Kelly Licea MRN: [id number]  SSN: [id number]    YOB: 1950  Age: 66 y.o. Sex: female       Return Appointment: 4 Weeks with Pablito Morgan MD    Instructions:   Sacral Wound:  Cleanse wound with normal saline. Apply saline wet-to-dry gauze to wound bed. Cover with ABD pads and paper tape. Change daily and whenever soiled.     Home health for dressing changes 3 times week    Should you experience increased redness, swelling, pain, foul odor, size of wound(s), or have a temperature over 101 degrees please contact the [address] [place] [address] Road at [phone] or if after hours contact your primary care physician or go to the hospital emergency department.     Signed By: Erwin Pastrana RN     May 19, 2022

## 2022-06-16 ENCOUNTER — HOSPITAL ENCOUNTER (OUTPATIENT)
Dept: WOUND CARE | Age: 72
Setting detail: RECURRING SERIES
Discharge: HOME | End: 2022-06-16
Payer: MEDICARE

## 2022-06-16 VITALS
HEART RATE: 46 BPM | HEIGHT: 64 IN | OXYGEN SATURATION: 98 % | DIASTOLIC BLOOD PRESSURE: 77 MMHG | RESPIRATION RATE: 18 BRPM | TEMPERATURE: 98.7 F | WEIGHT: 174 LBS | SYSTOLIC BLOOD PRESSURE: 166 MMHG | BODY MASS INDEX: 29.71 KG/M2

## 2022-06-16 PROBLEM — E66.01 OBESITY, MORBID (HCC): Status: ACTIVE | Noted: 2018-05-23

## 2022-06-16 PROBLEM — L98.429 STAGE 4 SKIN ULCER OF SACRAL REGION (HCC): Status: ACTIVE | Noted: 2022-03-03

## 2022-06-16 PROBLEM — R15.9 FECAL INCONTINENCE: Status: ACTIVE | Noted: 2022-03-03

## 2022-06-16 PROBLEM — D64.89 OTHER SPECIFIED ANEMIAS: Status: ACTIVE | Noted: 2022-03-03

## 2022-06-16 PROCEDURE — 99213 OFFICE O/P EST LOW 20 MIN: CPT

## 2022-06-16 PROCEDURE — 99214 OFFICE O/P EST MOD 30 MIN: CPT | Performed by: SURGERY

## 2022-06-16 RX ORDER — FOLIC ACID/VIT B COMPLEX AND C 0.8 MG
1 TABLET ORAL DAILY
COMMUNITY

## 2022-06-16 RX ORDER — ONDANSETRON 4 MG/1
TABLET, ORALLY DISINTEGRATING ORAL
COMMUNITY
Start: 2022-04-28

## 2022-06-16 RX ORDER — LABETALOL HYDROCHLORIDE 5 MG/ML
20 INJECTION, SOLUTION INTRAVENOUS ONCE
COMMUNITY
Start: 2022-04-14 | End: 2022-06-16

## 2022-06-16 RX ORDER — TORSEMIDE 100 MG/1
TABLET ORAL
COMMUNITY

## 2022-06-16 RX ORDER — SEVELAMER CARBONATE 800 MG/1
TABLET, FILM COATED ORAL
COMMUNITY
Start: 2022-05-16

## 2022-06-16 NOTE — WOUND CARE
Discharge Instructions for  Esteban Bain  [address] [place] [address], [address]   Phone [phone]   Fax [phone]      NAME:  Mitra Carreon  YOB: 1950  MEDICAL RECORD NUMBER:  [id number]  DATE:  6/16/2022    Return Appointment:   1 month with Slade Seth MD      Instructions: Sacral Wound:  Cleanse wound with normal saline. Collagen (preferably Puracol Plus) to wound base, cut to size. Fill remaining space with dry gauze. Cover with ABD pads and paper tape. Change dressing 3 times per week.     Home health for dressing changes 3 times week    Continue to use air mattress to offload sacrum while in bed; change position every 2 hours, incorporating mostly sidelying. Continue to use cushion/pillow with prolonged sitting in recliner during dialysis. Incorporate \"forward sitting\" while in regular chair to offload sacral area. Ambulate with rollator as much as tolerated at home to decrease time sitting. Should you experience increased redness, swelling, pain, foul odor, size of wound(s), or have a temperature over 101 degrees please contact the [address] [place] [address] Road at [phone] or if after hours contact your primary care physician or go to the hospital emergency department. PLEASE NOTE: IF YOU ARE UNABLE TO OBTAIN WOUND SUPPLIES, CONTINUE TO USE THE SUPPLIES YOU HAVE AVAILABLE UNTIL YOU ARE ABLE TO REACH US. IT IS MOST IMPORTANT TO KEEP THE WOUND COVERED AT ALL TIMES.     Electronically signed Torrey Castañeda PT, [facility] on 6/16/2022 at 10:50 AM

## 2022-06-16 NOTE — FLOWSHEET NOTE
06/16/22 1034   Wound 03/03/22 Sacrum #1   Date First Assessed/Time First Assessed: 03/03/22 1103   Present on Hospital Admission: Yes  Primary Wound Type: Pressure Injury  Location: Sacrum  Wound Description (Comments): #1   Wound Image    Wound Etiology Pressure Stage 3   Dressing Status Old drainage noted   Wound Cleansed Cleansed with saline   Dressing/Treatment Gauze dressing/dressing sponge;ABD   Wound Length (cm) 4 cm   Wound Width (cm) 2 cm   Wound Depth (cm) 0.5 cm   Wound Surface Area (cm^2) 8 cm^2   Wound Volume (cm^3) 4 cm^3   Wound Assessment Granulation tissue   Drainage Amount Small   Drainage Description Serosanguinous   Odor None   Carola-wound Assessment Intact   Wound Thickness Description not for Pressure Injury Full thickness   Patient is currently taking Aspirin 81 mg

## 2022-06-16 NOTE — PROGRESS NOTES
[address] [place] [address]  Consult Note/H&P/Progress Note      [address] [facility] RECORD NUMBER:  [id number]  AGE: 66 y.o. RACE Black /   GENDER: female  : 1950  EPISODE DATE:  2022       Subjective:      CC (Chief Complaint) and HISTORY of PRESENT ILLNESS (HPI):    Kay Scott is a 66 y.o. female who presents today for wound/ulcer evaluation. Kay Scott is a 66 y.o.  female who presents today for wound/ulcer evaluation. She came in for her first visit with us on 3/3/2022. She is seeing us for a large stage IV sacral pressure ulcer   She was having packing change performed with Dakin solution prior to wound center enrollment. She had a history of septic shock related to a dialysis catheter in early 2022. This resulted in hospitalization   She was transferred to skilled nursing facility on 2022. She is brought back to the ER on 2022 with a stage II sacral pressure ulcer which has progressed to stage IV.    She had a wound VAC at one point which could not get a seal.   She had C. difficile infection and has had a fecal management system in place for more than a month by her report                his information was obtained from the patient  The following HPI elements were documented for the patient's wound:  Location: Large  stage IV sacral pressure ulcer  Duration: Since approximately early 2022   Severity: Severe   Context:     Modifying Factors:  Nothing makes better or worse   Quality:     Timing:     Associated Signs and Symptoms : No recent fevers         Ulcer Identification:   Ulcer Type: pressure      Contributing Factors: chronic pressure, decreased mobility, incontinence of stool and obesity          PAST MEDICAL HISTORY  Past Medical History:   Diagnosis Date    Anemia     Arrhythmia     patient states she has a heart murmur,Echo 10/12/2021 EF 55-65% sees [facility]    CAD (coronary artery disease) 8/20218    cardiac cath, stent x 1    Cancer ([facility] [address]     uterine    Chronic kidney disease     2018    Diabetes ([facility] [address]     typell, avgfbs 95, last A1C was 7.0 on 06/27/2021 s/sx of hypo below 60    Dialysis patient [facility])     M,W,F    Heart failure ([facility] [address]     Echo 10/12/2021 on Entresto 55-65%, improved from 6/27/2021 Echo 25%    Hypercholesterolemia     Hypertension     Morbid obesity ([facility] [address]     Nausea & vomiting         PAST SURGICAL HISTORY  Past Surgical History:   Procedure Laterality Date    COLONOSCOPY      DILATION AND CURETTAGE OF UTERUS      HYSTERECTOMY (CERVIX STATUS UNKNOWN)      IR TUNNELED CATHETER PLACEMENT GREATER THAN 5 YEARS  8/23/2021    right chest    IR TUNNELED CATHETER PLACEMENT GREATER THAN 5 YEARS  8/23/2021    IR TUNNELED CATHETER PLACEMENT GREATER THAN 5 YEARS 8/23/2021 [facility] SPE[facility]    VASCULAR SURGERY Left 05/30/2018    AVF creation    VASCULAR SURGERY Right 12/01/2021    Creation of right upper arm brachial axillary loop graft       FAMILY HISTORY  Family History   Problem Relation Age of Onset    Hypertension Mother     Stroke Mother     Heart Disease Father         MI    Diabetes Brother     Heart Disease Brother     Heart Disease Brother     Heart Disease Sister     Hypertension Father        SOCIAL HISTORY  Social History     Tobacco Use    Smoking status: Never Smoker    Smokeless tobacco: Never Used   Substance Use Topics    Alcohol use: No    Drug use: No       ALLERGIES  No Known Allergies    MEDICATIONS  Current Outpatient Medications on File Prior to Encounter   Medication Sig Dispense Refill    ondansetron (ZOFRAN-ODT) 4 MG disintegrating tablet ondansetron 4 mg disintegrating tablet   Place 1 tablet 3 times a day by oral route as needed.       sevelamer (RENVELA) 800 MG tablet sevelamer carbonate 800 mg tablet   TAKE 1 TABLET BY MOUTH THREE TIMES DAILY WITH MEALS      B Complex-C-Folic Acid (NEPHRO-DINAH) 0.8 MG TABS Take 1 tablet by mouth daily      torsemide (DEMADEX) 100 MG tablet torsemide 100 mg tablet   Take 1 tablet by oral route.  aspirin 81 MG EC tablet Take by mouth daily      Ferrous Fumarate 325 (106 Fe) MG TABS Take by mouth      gabapentin (NEURONTIN) 100 MG capsule Take 100 mg by mouth daily.  insulin lispro (HUMALOG) 100 UNIT/ML injection vial Inject 100 Units into the skin      insulin NPH (HUMULIN N;NOVOLIN N) 100 UNIT/ML injection vial Inject into the skin      lisinopril (PRINIVIL;ZESTRIL) 40 MG tablet Take 40 mg by mouth daily      metoprolol succinate (TOPROL XL) 50 MG extended release tablet Take 50 mg by mouth daily      traMADol (ULTRAM) 50 MG tablet Take 50 mg by mouth every 6 hours as needed. No current facility-administered medications on file prior to encounter. REVIEW OF SYSTEMS  The patient has no difficulty with chest pain or shortness of breath. No fever or chills. Comprehensive review of systems was otherwise unremarkable except as noted above. Objective:   Physical Exam:   Recent vitals (if inpt):  Patient Vitals for the past 24 hrs:   BP Temp Temp src Pulse Resp SpO2 Height Weight   06/16/22 1034 -- -- -- -- -- -- 5' 4\" (1.626 m) 174 lb (78.9 kg)   06/16/22 1021 (!) 166/77 98.7 °F (37.1 °C) Oral (!) 46 18 98 % -- --       BP (!) 166/77   Pulse (!) 46   Temp 98.7 °F (37.1 °C) (Oral)   Resp 18   Ht 5' 4\" (1.626 m)   Wt 174 lb (78.9 kg)   SpO2 98%   BMI 29.87 kg/m²   Wt Readings from Last 3 Encounters:   06/16/22 174 lb (78.9 kg)   05/19/22 167 lb (75.8 kg)   12/21/21 190 lb (86.2 kg)     Constitutional: Alert, oriented, cooperative patient in no acute distress; appears stated age;  General appearance is within normal limits for wound care patient population. See the today's recorded vitals signs and constitutional data. Eyes: Pupils equal; Sclera are clear. EOMs intact; The eyes appear to track and move normally.  The sclera are not injected. The conjunctive are clear. The eyelids are normal. There is no scleral icterus. ENMT: There are no obvious external ear, nose, lip or mouth lesions. Nares normal; Neck: Overall contour of the neck is normal with no obvious neck masses. Gross hearing is within normal limits. No obvious neck masses  Resp:  Breathing is non-labored; normal rate and effort; no audible wheezing. CV: RRR;  no JVD; No evidence of cyanosis of the upper extremities. The extremities are perfused without embolic sign, splinter hemorrhages, or petechia. GI: soft and non-distended without acute abnormality noted. Musculoskeletal: unremarkable with normal function. No embolic signs or cyanosis. Neuro:  Oriented; moves all 4; no focal deficits  Psychiatric: Judgement and insight are within normal limits for the wound care population of patients. Patient is oriented to person, place, and time. Recent and remote memory are within normal limits. Mood and affect are within normal limits. Integumentary (Skin/Wounds)  See inspection of wound(s) below. There are no other skin areas of palpable concern. See wound center documentation including photos in the [address] Wound Template made part of this record by reference.          Wound Care Documentation:    Wound 03/03/22 Sacrum #1 (Active)   Wound Image   06/16/22 1034   Wound Etiology Pressure Stage 3 06/16/22 1034   Dressing Status Old drainage noted 06/16/22 1034   Wound Cleansed Cleansed with saline 06/16/22 1034   Dressing/Treatment Gauze dressing/dressing sponge;ABD 06/16/22 1034   Wound Length (cm) 4 cm 06/16/22 1034   Wound Width (cm) 2 cm 06/16/22 1034   Wound Depth (cm) 0.5 cm 06/16/22 1034   Wound Surface Area (cm^2) 8 cm^2 06/16/22 1034   Wound Volume (cm^3) 4 cm^3 06/16/22 1034   Wound Assessment Granulation tissue 06/16/22 1034   Drainage Amount Small 06/16/22 1034   Drainage Description Serosanguinous 06/16/22 1034   Odor None 06/16/22 1034   Carola-wound Assessment Intact 06/16/22 1034   Wound Thickness Description not for Pressure Injury Full thickness 06/16/22 1034   Number of days: 104                                           Amount and/or Complexity of Data Reviewed and Analyzed:  I reviewed and analyzed all of the unique labs and radiologic studies that are shown below as well as any that are in the HPI, and any that are in the expanded problem list below  *Each unique test, order, or document contributes to the combination of 2 or combination of 3 in Category 1 below. I also independently reviewed radiology images for studies I described above in the HPI or studies I have ordered. For this visit I also reviewed old records and prior notes. No results for input(s): WBC, HGB, PLT, NA, K, CL, CO2, BUN, CREA, GLU, INR, APTT, ALT, AML, AML, LCAD, PCO2, PO2, HCO3 in the last 72 hours. Invalid input(s): PTP, TBIL, TBILI, CBIL, SGOT, GPT, AP, LPSE, NH4, TROPT, TROIQ,  PH  No results for input(s): INR, APTT, ALT, AML in the last 72 hours.     Invalid input(s): PTP, TBIL, TBILI, CBIL, SGOT, GPT, AP, LPSE    Most recent blood counts (CBC) review  Lab Results   Component Value Date    WBC 9.6 02/03/2022    HGB 6.7 (LL) 02/03/2022    HCT 21.7 (L) 02/03/2022    MCV 90.0 02/03/2022     02/03/2022     Most recent chemistry (BMP) test review   Lab Results   Component Value Date     08/23/2021    K 4.7 08/23/2021     08/23/2021    CO2 27 08/23/2021    BUN 64 08/23/2021    CREATININE 10.40 08/23/2021    GLUCOSE 162 08/23/2021    CALCIUM 8.3 08/23/2021      Most recent Liver enzyme test review  No results found for: ALT, AST, GGT, ALKPHOS, BILITOT  Most recent coagulation (coags) review  @lastinr@  No results found for: INR, APTT, AML, AML, LCAD    Diabetic assessment  No results found for: LABA1C  No results found for: EAG    Nutritional assessment screen to assess wound healing ability:  No results found for: LABPROT, LABALBU  No results found for: TP, ALBR, ALB    Xray Result (most recent):  No results found for this or any previous visit from the past 3650 days. CT Result (most recent):  No results found for this or any previous visit from the past 3650 days. Admission date (for inpatients): 6/16/2022   Day of Surgery  * No procedures listed *    Assessment:      Problem List Items Addressed This Visit     None          [unfilled]    Active Problems:    * No active hospital problems. *  Resolved Problems:    * No resolved hospital problems. *      Patient Active Problem List    Diagnosis Date Noted    Stage 4 skin ulcer of sacral region [facility]) 03/03/2022    Other specified anemias 03/03/2022    Fecal incontinence 03/03/2022    C. difficile colitis 03/03/2022    Obesity, morbid ([address] 05/23/2018           Plan:     Number and Complexity of Problems addressed and   Risks of complications and/or morbidity of management    Treatment Note please see attached Discharge Instructions  Any procedures done today in the wound center (if documented in a separate note) in [facility] are made part of this record by reference  Written patient dismissal instructions given to patient or POA. Large stage IV sacral pressure ulcer  I sharply and selectively debrided this with a #15 scalpel on 3/3/22. Stop Dakin's solution and initiation of saline moistened gauze Kerlix packing into the sacral wound BID + after each bowel movement    On 6/16/2022 we converted to Puracol Plus dressing changes    I previously recommended discontinuation of fecal management system as it has been in place for >1mo and she is having formed stool coming out around the catheter. Recommend nutritional consultation for high-protein supplements  Recommend level 2 low air loss mattress. Recommend frequent turning side to side. See us in 3-4 weeks or sooner if needed    She has home health visits approximately once a week.          Anemia    Hgb 6.7 on 2/3/22   Follow-up LMD         BMI >29/Obesity   Encourage weight loss                Wound Care  No orders of the defined types were placed in this encounter. [unfilled]            Level of MDM (2/3 elements below)  Number and Complexity of Problems Addressed Amount and/or Complexity of Data to be Reviewed and Analyzed  *Each unique test, order, or document contributes to the combination of 2 or combination of 3 in Category 1 below.  Risk of Complications and/or Morbidity or Mortality of pt Management     63253  77745 SF Minimal  1self-limited or minor problem Minimal or none Minimal risk of morbidity from additional diagnostic testing or Rx   44924  69020 Low Low  2or more self-limited or minor problems;    or  1stable chronic illness;    or  7DBOXI, uncomplicated illness or injury   Limited  (Must meet the requirements of at least 1 of the 2 categories)  Category 1: Tests and documents   Any combination of 2 from the following:  Review of prior external note(s) from each unique source*;  review of the result(s) of each unique test*;   ordering of each unique test*    or   Category 2: Assessment requiring an independent historian(s)  (For the categories of independent interpretation of tests and discussion of management or test interpretation, see moderate or high) Low risk of morbidity from additional diagnostic testing or treatment     24821  10527 Mod Moderate  1or more chronic illnesses with exacerbation, progression, or side effects of treatment;    or  2or more stable chronic illnesses;    or  1undiagnosed new problem with uncertain prognosis;    or  1acute illness with systemic symptoms;    or  5ATWUR complicated injury   Moderate  (Must meet the requirements of at least 1 out of 3 categories)  Category 1: Tests, documents, or independent historian(s)  Any combination of 3 from the following:   Review of prior external note(s) from each unique source*;  VEHMAA of the result(s) of each unique test*;  Ordering of each unique test*;  Assessment requiring an independent historian(s)    or  Category 2: Independent interpretation of tests   Independent interpretation of a test performed by another physician/other qualified health care professional (not separately reported);     or  Category 3: Discussion of management or test interpretation  Discussion of management or test interpretation with external physician/other qualified health care professional/appropriate source (not separately reported)   Moderate risk of morbidity from additional diagnostic testing or treatment  Examples only:  Prescription drug management - advanced wound care prescription wound care products- purachol plus started 6/16/22  (eg Iodosorb, hydrofera, silvadene, collagen, puracol plus, optiloc, biologics - placenta, Theraskin, Apligraf).   Note also that if home health care is involved, they will not apply topical therapies without a prescription/order from physician      [facility] regarding minor surgery with identified patient or procedure risk factors  Decision regarding elective major surgery without identified patient or procedure risk factors   Diagnosis or treatment significantly limited by social determinants of health       92035  01362 High High  1or more chronic illnesses with severe exacerbation, progression, or side effects of treatment;    or  1 acute or chronic illness or injury that poses a threat to life or bodily function   Extensive  (Must meet the requirements of at least 2 out of 3 categories)  Category 1: Tests, documents, or independent historian(s)  Any combination of 3 from the following:   Review of prior external note(s) from each unique source*;  Review of the result(s) of each unique test*;   Ordering of each unique test*;   Assessment requiring an independent historian(s)    or   Category 2: Independent interpretation of tests   Independent interpretation of a test performed by another physician/other qualified health care professional (not separately reported);     or  Category 3: Discussion of management or test interpretation  Discussion of management or test interpretation with external physician/other qualified health care professional/appropriate source (not separately reported)   High risk of morbidity from additional diagnostic testing or treatment  Examples only:  Drug therapy requiring intensive monitoring for toxicity  Decision regarding elective major surgery with identified patient or procedure risk factors  Decision regarding emergency major surgery  Decision regarding hospitalization  Decision not to resuscitate or to de-escalate care because of poor prognosis                 I have personally performed a face-to-face diagnostic evaluation and management  service on this patient. I have independently seen the patient. I have independently obtained the above history from the patient/family. I have independently examined the patient with above findings. I have independently reviewed data/labs for this patient and developed the above plan of care (MDM).       Electronically signed by Doreen Hood MD on 6/16/2022 at 10:39 AM

## 2022-07-14 ENCOUNTER — HOSPITAL ENCOUNTER (OUTPATIENT)
Dept: WOUND CARE | Age: 72
Setting detail: RECURRING SERIES
Discharge: HOME | End: 2022-07-14
Payer: MEDICARE

## 2022-07-14 VITALS
BODY MASS INDEX: 29.02 KG/M2 | TEMPERATURE: 96.7 F | WEIGHT: 170 LBS | RESPIRATION RATE: 18 BRPM | HEIGHT: 64 IN | HEART RATE: 44 BPM | DIASTOLIC BLOOD PRESSURE: 85 MMHG | SYSTOLIC BLOOD PRESSURE: 156 MMHG

## 2022-07-14 DIAGNOSIS — E66.01 OBESITY, MORBID (HCC): ICD-10-CM

## 2022-07-14 DIAGNOSIS — L98.429 STAGE 4 SKIN ULCER OF SACRAL REGION (HCC): Primary | ICD-10-CM

## 2022-07-14 PROCEDURE — 99214 OFFICE O/P EST MOD 30 MIN: CPT | Performed by: SURGERY

## 2022-07-14 PROCEDURE — 99213 OFFICE O/P EST LOW 20 MIN: CPT

## 2022-07-14 RX ORDER — LIDOCAINE 40 MG/G
CREAM TOPICAL ONCE
Status: CANCELLED | OUTPATIENT
Start: 2022-07-14 | End: 2022-07-14

## 2022-07-14 RX ORDER — LIDOCAINE 50 MG/G
OINTMENT TOPICAL ONCE
Status: CANCELLED | OUTPATIENT
Start: 2022-07-14 | End: 2022-07-14

## 2022-07-14 RX ORDER — CLOBETASOL PROPIONATE 0.5 MG/G
OINTMENT TOPICAL ONCE
Status: CANCELLED | OUTPATIENT
Start: 2022-07-14 | End: 2022-07-14

## 2022-07-14 RX ORDER — BACITRACIN ZINC AND POLYMYXIN B SULFATE 500; 1000 [USP'U]/G; [USP'U]/G
OINTMENT TOPICAL ONCE
Status: CANCELLED | OUTPATIENT
Start: 2022-07-14 | End: 2022-07-14

## 2022-07-14 RX ORDER — LIDOCAINE HYDROCHLORIDE 40 MG/ML
SOLUTION TOPICAL ONCE
Status: CANCELLED | OUTPATIENT
Start: 2022-07-14 | End: 2022-07-14

## 2022-07-14 RX ORDER — GENTAMICIN SULFATE 1 MG/G
OINTMENT TOPICAL ONCE
Status: CANCELLED | OUTPATIENT
Start: 2022-07-14 | End: 2022-07-14

## 2022-07-14 RX ORDER — BACITRACIN, NEOMYCIN, POLYMYXIN B 400; 3.5; 5 [USP'U]/G; MG/G; [USP'U]/G
OINTMENT TOPICAL ONCE
Status: CANCELLED | OUTPATIENT
Start: 2022-07-14 | End: 2022-07-14

## 2022-07-14 RX ORDER — AMLODIPINE BESYLATE 5 MG/1
5 TABLET ORAL DAILY
COMMUNITY

## 2022-07-14 RX ORDER — BETAMETHASONE DIPROPIONATE 0.05 %
OINTMENT (GRAM) TOPICAL ONCE
Status: CANCELLED | OUTPATIENT
Start: 2022-07-14 | End: 2022-07-14

## 2022-07-14 RX ORDER — LIDOCAINE HYDROCHLORIDE 20 MG/ML
JELLY TOPICAL ONCE
Status: CANCELLED | OUTPATIENT
Start: 2022-07-14 | End: 2022-07-14

## 2022-07-14 RX ORDER — GINSENG 100 MG
CAPSULE ORAL ONCE
Status: CANCELLED | OUTPATIENT
Start: 2022-07-14 | End: 2022-07-14

## 2022-07-14 NOTE — WOUND CARE
Discharge Instructions for  Esteban Bain  [address] [place] [address]  [address], [address]  Phone [phone]   Fax [phone]      NAME:  Arin Baum  YOB: 1950  MEDICAL RECORD NUMBER:  [id number]  DATE:  7/14/2022    Return Appointment:   1 month with Rm Guerrero MD      Instructions:   Sacral Wound:  Cleanse wound with normal saline. Collagen (preferably Puracol Plus) to wound base, cut to size. Fill remaining space with dry gauze. Cover with ABD pads and paper tape. Change dressing 3 times per week.  [address] [place] [address] for dressing changes 3 times week     Avoid back lying. Change position every 2 hours alternating side to side. Lean side to side when sitting. Incorporate \"forward sitting\" while in regular chair to offload pressure from wound site. Continue to use air mattress to offload sacrum while in bed. Continue to use offloading cushion/pillow in recliner during dialysis. Increase ambulation/walking at home, decreasing sitting time. Should you experience increased redness, swelling, pain, foul odor, size of wound(s), or have a temperature over 101 degrees please contact the [address] [place] [address] Road at [phone] or if after hours contact your primary care physician or go to the hospital emergency department. PLEASE NOTE: IF YOU ARE UNABLE TO OBTAIN WOUND SUPPLIES, CONTINUE TO USE THE SUPPLIES YOU HAVE AVAILABLE UNTIL YOU ARE ABLE TO REACH US. IT IS MOST IMPORTANT TO KEEP THE WOUND COVERED AT ALL TIMES. Electronically signed Mark Yates.  Chelsie Chi RN on 7/14/2022 at 10:40 AM [place]

## 2022-07-14 NOTE — FLOWSHEET NOTE
07/14/22 1031   Wound 03/03/22 Sacrum #1   Date First Assessed/Time First Assessed: 03/03/22 1103   Present on Hospital Admission: Yes  Primary Wound Type: Pressure Injury  Location: Sacrum  Wound Description (Comments): #1   Wound Image    Wound Etiology Pressure Stage 3   Dressing Status Clean;Dry;New drainage noted   Wound Cleansed Cleansed with saline   Dressing/Treatment   (ABD)   Wound Length (cm) 3 cm   Wound Width (cm) 1.6 cm   Wound Depth (cm) 0.3 cm   Wound Surface Area (cm^2) 4.8 cm^2   Change in Wound Size % (l*w) 40   Wound Volume (cm^3) 1.44 cm^3   Wound Healing % 64   Wound Assessment Granulation tissue   Drainage Amount Small   Drainage Description Serous   Odor None   Carola-wound Assessment Intact   Margins Epibole (rolled edges)   Wound Thickness Description not for Pressure Injury Full thickness     Patient is currently taking ASA 81 DAILY. Wound mechanically debrided with gauze and normal saline.

## 2022-07-14 NOTE — PROGRESS NOTES
Card    CAD (coronary artery disease) 8/20218    cardiac cath, stent x 1    Cancer ([facility]     uterine    Chronic kidney disease     2018    Diabetes ([facility]     typell, avgfbs 95, last A1C was 7.0 on 06/27/2021 s/sx of hypo below 60    Dialysis patient [facility])     M,W,F    Heart failure ([facility] [address]     Echo 10/12/2021 on Entresto 55-65%, improved from 6/27/2021 Echo 25%    Hypercholesterolemia     Hypertension     Morbid obesity ([facility]     Nausea & vomiting         PAST SURGICAL HISTORY  Past Surgical History:   Procedure Laterality Date    COLONOSCOPY      DILATION AND CURETTAGE OF UTERUS      HYSTERECTOMY (CERVIX STATUS UNKNOWN)      IR TUNNELED CATHETER PLACEMENT GREATER THAN 5 YEARS  8/23/2021    right chest    IR TUNNELED CATHETER PLACEMENT GREATER THAN 5 YEARS  8/23/2021    IR TUNNELED CATHETER PLACEMENT GREATER THAN 5 YEARS 8/23/2021 [facility]    VASCULAR SURGERY Left 05/30/2018    AVF creation    VASCULAR SURGERY Right 12/01/2021    Creation of right upper arm brachial axillary loop graft       FAMILY HISTORY  Family History   Problem Relation Age of Onset    Hypertension Mother     Stroke Mother     Heart Disease Father         MI    Diabetes Brother     Heart Disease Brother     Heart Disease Brother     Heart Disease Sister     Hypertension Father        SOCIAL HISTORY  Social History     Tobacco Use    Smoking status: Never Smoker    Smokeless tobacco: Never Used   Substance Use Topics    Alcohol use: No    Drug use: No       ALLERGIES  No Known Allergies    MEDICATIONS  Current Outpatient Medications on File Prior to Encounter   Medication Sig Dispense Refill    B Complex-C-Folic Acid (NEPHRO-DINAH) 0.8 MG TABS Take 1 tablet by mouth daily      ondansetron (ZOFRAN-ODT) 4 MG disintegrating tablet ondansetron 4 mg disintegrating tablet   Place 1 tablet 3 times a day by oral route as needed.       sevelamer (RENVELA) 800 MG tablet sevelamer carbonate 800 mg tablet   TAKE 1 TABLET BY MOUTH THREE TIMES DAILY WITH MEALS      torsemide (DEMADEX) 100 MG tablet torsemide 100 mg tablet   Take 1 tablet by oral route.  aspirin 81 MG EC tablet Take by mouth daily      Ferrous Fumarate 325 (106 Fe) MG TABS Take by mouth      gabapentin (NEURONTIN) 100 MG capsule Take 100 mg by mouth daily.  insulin lispro (HUMALOG) 100 UNIT/ML injection vial Inject 100 Units into the skin      insulin NPH (HUMULIN N;NOVOLIN N) 100 UNIT/ML injection vial Inject into the skin      lisinopril (PRINIVIL;ZESTRIL) 40 MG tablet Take 40 mg by mouth daily      metoprolol succinate (TOPROL XL) 50 MG extended release tablet Take 50 mg by mouth daily      traMADol (ULTRAM) 50 MG tablet Take 50 mg by mouth every 6 hours as needed. No current facility-administered medications on file prior to encounter. REVIEW OF SYSTEMS  The patient has no difficulty with chest pain or shortness of breath. No fever or chills. Comprehensive review of systems was otherwise unremarkable except as noted above. Objective:   Physical Exam:   Recent vitals (if inpt):  Patient Vitals for the past 24 hrs:   BP Temp Temp src Pulse Resp Height Weight   07/14/22 1026 (!) 156/85 (!) 96.7 °F (35.9 °C) Temporal (!) 44 18 5' 4\" (1.626 m) 170 lb (77.1 kg)       BP (!) 156/85   Pulse (!) 44   Temp (!) 96.7 °F (35.9 °C) (Temporal)   Resp 18   Ht 5' 4\" (1.626 m)   Wt 170 lb (77.1 kg)   BMI 29.18 kg/m²   Wt Readings from Last 3 Encounters:   07/14/22 170 lb (77.1 kg)   06/16/22 174 lb (78.9 kg)   05/19/22 167 lb (75.8 kg)     Constitutional: Alert, oriented, cooperative patient in no acute distress; appears stated age;  General appearance is within normal limits for wound care patient population. See the today's recorded vitals signs and constitutional data. Eyes: Pupils equal; Sclera are clear. EOMs intact; The eyes appear to track and move normally. The sclera are not injected. The conjunctive are clear.  The eyelids are normal. There is no scleral icterus. ENMT: There are no obvious external ear, nose, lip or mouth lesions. Nares normal; Neck: Overall contour of the neck is normal with no obvious neck masses. Gross hearing is within normal limits. No obvious neck masses  Resp:  Breathing is non-labored; normal rate and effort; no audible wheezing. CV: RRR;  no JVD; No evidence of cyanosis of the upper extremities. The extremities are perfused without embolic sign, splinter hemorrhages, or petechia. GI: soft and non-distended without acute abnormality noted. Musculoskeletal: unremarkable with normal function. No embolic signs or cyanosis. Neuro:  Oriented; moves all 4; no focal deficits  Psychiatric: Judgement and insight are within normal limits for the wound care population of patients. Patient is oriented to person, place, and time. Recent and remote memory are within normal limits. Mood and affect are within normal limits. Integumentary (Skin/Wounds)  See inspection of wound(s) below. There are no other skin areas of palpable concern. See wound center documentation including photos in the [address] Wound Template made part of this record by reference.          Wound Care Documentation:    Wound 03/03/22 Sacrum #1 (Active)   Wound Image   06/16/22 1034   Wound Etiology Pressure Stage 3 07/14/22 1031   Dressing Status Clean;Dry;New drainage noted 07/14/22 1031   Wound Cleansed Cleansed with saline 07/14/22 1031   Dressing/Treatment Gauze dressing/dressing sponge;ABD 06/16/22 1034   Wound Length (cm) 3 cm 07/14/22 1031   Wound Width (cm) 1.3 cm 07/14/22 1031   Wound Depth (cm) 0.2 cm 07/14/22 1031   Wound Surface Area (cm^2) 3.9 cm^2 07/14/22 1031   Change in Wound Size % (l*w) 51.25 07/14/22 1031   Wound Volume (cm^3) 0.78 cm^3 07/14/22 1031   Wound Healing % 80 07/14/22 1031   Wound Assessment Granulation tissue 07/14/22 1031   Drainage Amount Scant 07/14/22 1031   Drainage Description Serosanguinous 07/14/22 1031   Odor None 07/14/22 1031   Carola-wound Assessment Intact 07/14/22 1031   Margins Epibole (rolled edges) 07/14/22 1031   Wound Thickness Description not for Pressure Injury Full thickness 07/14/22 1031   Number of days: 132                                               Amount and/or Complexity of Data Reviewed and Analyzed:  I reviewed and analyzed all of the unique labs and radiologic studies that are shown below as well as any that are in the HPI, and any that are in the expanded problem list below  *Each unique test, order, or document contributes to the combination of 2 or combination of 3 in Category 1 below. I also independently reviewed radiology images for studies I described above in the HPI or studies I have ordered. For this visit I also reviewed old records and prior notes. No results for input(s): WBC, HGB, PLT, NA, K, CL, CO2, BUN, CREA, GLU, INR, APTT, ALT, AML, AML, LCAD, PCO2, PO2, HCO3 in the last 72 hours. Invalid input(s): PTP, TBIL, TBILI, CBIL, SGOT, GPT, AP, LPSE, NH4, TROPT, TROIQ,  PH  No results for input(s): INR, APTT, ALT, AML in the last 72 hours.     Invalid input(s): PTP, TBIL, TBILI, CBIL, SGOT, GPT, AP, LPSE    Most recent blood counts (CBC) review  Lab Results   Component Value Date    WBC 9.6 02/03/2022    HGB 6.7 (LL) 02/03/2022    HCT 21.7 (L) 02/03/2022    MCV 90.0 02/03/2022     02/03/2022     Most recent chemistry (BMP) test review   Lab Results   Component Value Date/Time     08/23/2021 11:01 AM    K 4.7 08/23/2021 11:01 AM     08/23/2021 11:01 AM    CO2 27 08/23/2021 11:01 AM    BUN 64 08/23/2021 11:01 AM    CREATININE 10.40 08/23/2021 11:01 AM    GLUCOSE 162 08/23/2021 11:01 AM    CALCIUM 8.3 08/23/2021 11:01 AM      Most recent Liver enzyme test review  No results found for: ALT, AST, GGT, ALKPHOS, BILITOT  Most recent coagulation (coags) review  @lastinr@  No results found for: INR, APTT, AML, AML, LCAD    Diabetic assessment  No results found for: LABA1C  No results found for: EAG    Nutritional assessment screen to assess wound healing ability:  No results found for: LABPROT, LABALBU  No results found for: TP, ALBR, ALB    Xray Result (most recent):  No results found for this or any previous visit from the past 3650 days. CT Result (most recent):  No results found for this or any previous visit from the past 3650 days. Admission date (for inpatients): 7/14/2022   Day of Surgery  * No procedures listed *    Assessment:      Problem List Items Addressed This Visit     None          [unfilled]    Active Problems:    Stage 4 skin ulcer of sacral region [facility])  Resolved Problems:    * No resolved hospital problems. 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 difficile colitis 03/03/2022    Obesity, morbid ([address] 05/23/2018           Plan:     Number and Complexity of Problems addressed and   Risks of complications and/or morbidity of management    Treatment Note please see attached Discharge Instructions  Any procedures done today in the wound center (if documented in a separate note) in [facility] are made part of this record by reference  Written patient dismissal instructions given to patient or POA. Large stage IV sacral pressure ulcer  I sharply and selectively debrided this with a #15 scalpel on 3/3/22. Stop Dakin's solution and initiation of saline moistened gauze Kerlix packing into the sacral wound BID + after each bowel movement    On 6/16/2022 we converted to Puracol Plus dressing changes    I previously recommended discontinuation of fecal management system as it has been in place for >1mo and she is having formed stool coming out around the catheter.   Recommend nutritional consultation for high-protein supplements  Recommend level 2 low air loss mattress. Recommend frequent turning side to side. See us in 3-4 weeks or sooner if needed    She has home health visits approximately once a week. Anemia    Hgb 6.7 on 2/3/22   Follow-up LMD         BMI >29/Obesity   Encourage weight loss                Wound Care  No orders of the defined types were placed in this encounter. [unfilled]            Level of MDM (2/3 elements below)  Number and Complexity of Problems Addressed Amount and/or Complexity of Data to be Reviewed and Analyzed  *Each unique test, order, or document contributes to the combination of 2 or combination of 3 in Category 1 below.  Risk of Complications and/or Morbidity or Mortality of pt Management     99688  71227 SF Minimal  1self-limited or minor problem Minimal or none Minimal risk of morbidity from additional diagnostic testing or Rx   89334  79100 Low Low  2or more self-limited or minor problems;    or  1stable chronic illness;    or  8FHHAM, uncomplicated illness or injury   Limited  (Must meet the requirements of at least 1 of the 2 categories)  Category 1: Tests and documents   Any combination of 2 from the following:  Review of prior external note(s) from each unique source*;  review of the result(s) of each unique test*;   ordering of each unique test*    or   Category 2: Assessment requiring an independent historian(s)  (For the categories of independent interpretation of tests and discussion of management or test interpretation, see moderate or high) Low risk of morbidity from additional diagnostic testing or treatment     10647  88161 Mod Moderate  1or more chronic illnesses with exacerbation, progression, or side effects of treatment;    or  2or more stable chronic illnesses;    or  1undiagnosed new problem with uncertain prognosis;    or  1acute illness with systemic symptoms;    or  8JQPSL complicated injury   Moderate  (Must meet the requirements of at least 1 out of 3 categories)  Category 1: Tests, documents, or independent historian(s)  Any combination of 3 from the following:   Review of prior external note(s) from each unique source*;  Review of the result(s) of each unique test*;  Ordering of each unique test*;  Assessment requiring an independent historian(s)    or  Category 2: Independent interpretation of tests   Independent interpretation of a test performed by another physician/other qualified health care professional (not separately reported);     or  Category 3: Discussion of management or test interpretation  Discussion of management or test interpretation with external physician/other qualified health care professional/appropriate source (not separately reported)   Moderate risk of morbidity from additional diagnostic testing or treatment  Examples only:  Prescription drug management - advanced wound care prescription wound care products- purachol plus started 6/16/22  (eg Iodosorb, hydrofera, silvadene, collagen, puracol plus, optiloc, biologics - placenta, Theraskin, Apligraf).   Note also that if home health care is involved, they will not apply topical therapies without a prescription/order from physician      [facility] regarding minor surgery with identified patient or procedure risk factors  Decision regarding elective major surgery without identified patient or procedure risk factors   Diagnosis or treatment significantly limited by social determinants of health       76002  53413 High High  1or more chronic illnesses with severe exacerbation, progression, or side effects of treatment;    or  1 acute or chronic illness or injury that poses a threat to life or bodily function   Extensive  (Must meet the requirements of at least 2 out of 3 categories)  Category 1: Tests, documents, or independent historian(s)  Any combination of 3 from the following:   Review of prior external note(s) from each unique source*;  Review of the result(s) of each unique test*;   Ordering of each unique test*;   Assessment requiring an independent historian(s)    or   Category 2: Independent interpretation of tests   Independent interpretation of a test performed by another physician/other qualified health care professional (not separately reported);     or  Category 3: Discussion of management or test interpretation  Discussion of management or test interpretation with external physician/other qualified health care professional/appropriate source (not separately reported)   High risk of morbidity from additional diagnostic testing or treatment  Examples only:  Drug therapy requiring intensive monitoring for toxicity  Decision regarding elective major surgery with identified patient or procedure risk factors  Decision regarding emergency major surgery  Decision regarding hospitalization  Decision not to resuscitate or to de-escalate care because of poor prognosis                 I have personally performed a face-to-face diagnostic evaluation and management  service on this patient. I have independently seen the patient. I have independently obtained the above history from the patient/family. I have independently examined the patient with above findings. I have independently reviewed data/labs for this patient and developed the above plan of care (MDM).       Electronically signed by Jean Pierre Lopez MD on 7/14/2022 at 10:36 AM

## 2022-08-11 ENCOUNTER — HOSPITAL ENCOUNTER (OUTPATIENT)
Dept: WOUND CARE | Age: 72
Discharge: HOME | End: 2022-08-11
Payer: MEDICARE

## 2022-08-11 VITALS
OXYGEN SATURATION: 98 % | DIASTOLIC BLOOD PRESSURE: 64 MMHG | BODY MASS INDEX: 28.92 KG/M2 | TEMPERATURE: 97.7 F | HEIGHT: 64 IN | RESPIRATION RATE: 18 BRPM | SYSTOLIC BLOOD PRESSURE: 148 MMHG | WEIGHT: 169.4 LBS | HEART RATE: 46 BPM

## 2022-08-11 DIAGNOSIS — L98.429 STAGE 4 SKIN ULCER OF SACRAL REGION (HCC): Primary | ICD-10-CM

## 2022-08-11 DIAGNOSIS — R15.9 INCONTINENCE OF FECES, UNSPECIFIED FECAL INCONTINENCE TYPE: ICD-10-CM

## 2022-08-11 DIAGNOSIS — E66.01 OBESITY, MORBID (HCC): ICD-10-CM

## 2022-08-11 PROCEDURE — 99214 OFFICE O/P EST MOD 30 MIN: CPT | Performed by: SURGERY

## 2022-08-11 PROCEDURE — 99213 OFFICE O/P EST LOW 20 MIN: CPT

## 2022-08-11 RX ORDER — BACITRACIN, NEOMYCIN, POLYMYXIN B 400; 3.5; 5 [USP'U]/G; MG/G; [USP'U]/G
OINTMENT TOPICAL ONCE
OUTPATIENT
Start: 2022-08-11 | End: 2022-08-11

## 2022-08-11 RX ORDER — LIDOCAINE HYDROCHLORIDE 20 MG/ML
JELLY TOPICAL ONCE
OUTPATIENT
Start: 2022-08-11 | End: 2022-08-11

## 2022-08-11 RX ORDER — LIDOCAINE 50 MG/G
OINTMENT TOPICAL ONCE
OUTPATIENT
Start: 2022-08-11 | End: 2022-08-11

## 2022-08-11 RX ORDER — LIDOCAINE HYDROCHLORIDE 40 MG/ML
SOLUTION TOPICAL ONCE
OUTPATIENT
Start: 2022-08-11 | End: 2022-08-11

## 2022-08-11 RX ORDER — CINACALCET 30 MG/1
TABLET, FILM COATED ORAL EVERY OTHER DAY
COMMUNITY
Start: 2022-05-14

## 2022-08-11 RX ORDER — CLOBETASOL PROPIONATE 0.5 MG/G
OINTMENT TOPICAL ONCE
OUTPATIENT
Start: 2022-08-11 | End: 2022-08-11

## 2022-08-11 RX ORDER — METOPROLOL SUCCINATE 25 MG/1
25 TABLET, EXTENDED RELEASE ORAL DAILY
COMMUNITY

## 2022-08-11 RX ORDER — BACITRACIN ZINC AND POLYMYXIN B SULFATE 500; 1000 [USP'U]/G; [USP'U]/G
OINTMENT TOPICAL ONCE
OUTPATIENT
Start: 2022-08-11 | End: 2022-08-11

## 2022-08-11 RX ORDER — CLONIDINE HYDROCHLORIDE 0.2 MG/1
TABLET ORAL
COMMUNITY
Start: 2022-08-09

## 2022-08-11 RX ORDER — BETAMETHASONE DIPROPIONATE 0.05 %
OINTMENT (GRAM) TOPICAL ONCE
OUTPATIENT
Start: 2022-08-11 | End: 2022-08-11

## 2022-08-11 RX ORDER — LIDOCAINE 40 MG/G
CREAM TOPICAL ONCE
OUTPATIENT
Start: 2022-08-11 | End: 2022-08-11

## 2022-08-11 RX ORDER — GINSENG 100 MG
CAPSULE ORAL ONCE
OUTPATIENT
Start: 2022-08-11 | End: 2022-08-11

## 2022-08-11 RX ORDER — ACETAMINOPHEN 500 MG
TABLET ORAL
COMMUNITY
Start: 2022-03-15

## 2022-08-11 RX ORDER — GENTAMICIN SULFATE 1 MG/G
OINTMENT TOPICAL ONCE
OUTPATIENT
Start: 2022-08-11 | End: 2022-08-11

## 2022-08-11 NOTE — DISCHARGE INSTRUCTIONS
Discharge Instructions for  Esteban Bain  [address] [facility] [address]  [place] , [address]  Phone [phone]  Fax [phone]        NAME:  Ros Edwards  YOB: 1950  MEDICAL RECORD NUMBER:  [id number]  DATE:  8/11/2022     Return Appointment:   5 weeks with Pepper Melchor MD        Instructions: Sacral Wound:  Cleanse wound with normal saline. Collagen (preferably Puracol Plus) to wound base, cut to size. Fill remaining space with dry gauze. Cover with ABD pads and paper tape or bordered foam.  Change dressing 3 times per week. Home Health for dressing changes 3 times week     Avoid back lying. Change position every 2 hours alternating side to side. Incorporate \"forward sitting\" while in regular chair to offload pressure from wound site. Continue to use air mattress to offload sacrum while in bed. Continue to use offloading cushion/pillow in recliner during dialysis. Increase ambulation/walking at home, decreasing sitting time. Should you experience increased redness, swelling, pain, foul odor, size of wound(s), or have a temperature over 101 degrees please contact the [address] [place] [address] Road at [phone] or if after hours contact your primary care physician or go to the hospital emergency department. PLEASE NOTE: IF YOU ARE UNABLE TO OBTAIN WOUND SUPPLIES, CONTINUE TO USE THE SUPPLIES YOU HAVE AVAILABLE UNTIL YOU ARE ABLE TO REACH US. IT IS MOST IMPORTANT TO KEEP THE WOUND COVERED AT ALL TIMES.      Electronically signed Akash Garcia PT on 8/11/2022 at 10:47

## 2022-08-11 NOTE — WOUND CARE
Discharge Instructions for  Esteban Bain  [address] [facility] [address]  [address] [facility], [address]  Phone [phone]   Fax [phone]      NAME:  Norah Espana  YOB: 1950  MEDICAL RECORD NUMBER:  [id number]  DATE:  8/11/2022    Return Appointment:   5 weeks with Nemo Hong MD      Instructions: Sacral Wound:  Cleanse wound with normal saline. Collagen (preferably Puracol Plus) to wound base, cut to size. Fill remaining space with dry gauze. Cover with ABD pads and paper tape or bordered foam.  Change dressing 3 times per week. Home Health for dressing changes 3 times week     Avoid back lying. Change position every 2 hours alternating side to side. Incorporate \"forward sitting\" while in regular chair to offload pressure from wound site. Continue to use air mattress to offload sacrum while in bed. Continue to use offloading cushion/pillow in recliner during dialysis. Increase ambulation/walking at home, decreasing sitting time. Should you experience increased redness, swelling, pain, foul odor, size of wound(s), or have a temperature over 101 degrees please contact the [address] [place] [address] Road at [phone] or if after hours contact your primary care physician or go to the hospital emergency department. PLEASE NOTE: IF YOU ARE UNABLE TO OBTAIN WOUND SUPPLIES, CONTINUE TO USE THE SUPPLIES YOU HAVE AVAILABLE UNTIL YOU ARE ABLE TO REACH US. IT IS MOST IMPORTANT TO KEEP THE WOUND COVERED AT ALL TIMES.     Electronically signed Keegan Deshpande PT on 8/11/2022 at 10:47 AM

## 2022-08-11 NOTE — PROGRESS NOTES
[address]  Consult Note/H&P/Progress Note      [facility] RECORD NUMBER:  [id number]  AGE: 66 y.o. RACE Black /   GENDER: female  : 1950  EPISODE DATE:  2022       Subjective:      CC (Chief Complaint) and HISTORY of PRESENT ILLNESS (HPI):    Maddi Juarez is a 66 y.o. female who presents today for wound/ulcer evaluation. She came in for her first visit with us on 3/3/22. She is seeing us for a large stage IV sacral pressure ulcer   She was having packing change performed with Dakin solution prior to wound center enrollment. She had a history of septic shock related to a dialysis catheter in early 2022. This resulted in hospitalization   She was transferred to skilled nursing facility on 2022. She is brought back to the ER on 2022 with a stage II sacral pressure ulcer which has progressed to stage IV.    She had a wound VAC at one point which could not get a seal.   She had C. difficile infection and has had a fecal management system in place for more than a month by her report                his information was obtained from the patient  The following HPI elements were documented for the patient's wound:  Location: Large  stage IV sacral pressure ulcer  Duration: Since approximately early 2022   Severity: Severe   Context:     Modifying Factors:  Nothing makes better or worse   Quality:     Timing:     Associated Signs and Symptoms : No recent fevers         Ulcer Identification:   Ulcer Type: pressure      Contributing Factors: chronic pressure, decreased mobility, incontinence of stool and obesity          PAST MEDICAL HISTORY  Past Medical History:   Diagnosis Date    Anemia     Arrhythmia     patient states she has a heart murmur,Echo 10/12/2021 EF 55-65% sees [facility] Card    CAD (coronary artery disease)     cardiac cath, stent x 1    Cancer ([facility] [address] uterine    Chronic kidney disease     2018    Diabetes ([facility] [address]     typell, avgfbs 95, last A1C was 7.0 on 06/27/2021 s/sx of hypo below 60    Dialysis patient [facility])     M,W,F    Heart failure ([facility] [address]     Echo 10/12/2021 on Entresto 55-65%, improved from 6/27/2021 Echo 25%    Hypercholesterolemia     Hypertension     Morbid obesity ([facility] [address]     Nausea & vomiting         PAST SURGICAL HISTORY  Past Surgical History:   Procedure Laterality Date    COLONOSCOPY      DILATION AND CURETTAGE OF UTERUS      HYSTERECTOMY (CERVIX STATUS UNKNOWN)      IR TUNNELED CATHETER PLACEMENT GREATER THAN 5 YEARS  8/23/2021    right chest    IR TUNNELED CATHETER PLACEMENT GREATER THAN 5 YEARS  8/23/2021    IR TUNNELED CATHETER PLACEMENT GREATER THAN 5 YEARS 8/23/2021 [facility] RADIOLOGY SPECIALS    VASCULAR SURGERY Left 05/30/2018    AVF creation    VASCULAR SURGERY Right 12/01/2021    Creation of right upper arm brachial axillary loop graft       FAMILY HISTORY  Family History   Problem Relation Age of Onset    Hypertension Mother     Stroke Mother     Heart Disease Father         MI    Diabetes Brother     Heart Disease Brother     Heart Disease Brother     Heart Disease Sister     Hypertension Father        SOCIAL HISTORY  Social History     Tobacco Use    Smoking status: Never    Smokeless tobacco: Never   Substance Use Topics    Alcohol use: No    Drug use: No       ALLERGIES  No Known Allergies    MEDICATIONS  Current Outpatient Medications on File Prior to Encounter   Medication Sig Dispense Refill    metoprolol succinate (TOPROL XL) 25 MG extended release tablet Take 25 mg by mouth in the morning.       cinacalcet (SENSIPAR) 30 MG tablet every other day      acetaminophen (TYLENOL) 500 MG tablet 1 tablet EVERY 6 HOURS (route: oral)      cloNIDine (CATAPRES) 0.2 MG tablet       amLODIPine (NORVASC) 5 MG tablet Take 5 mg by mouth daily      B Complex-C-Folic Acid (NEPHRO-DINAH) 0.8 MG TABS Take 1 tablet by mouth daily      ondansetron (ZOFRAN-ODT) 4 MG disintegrating tablet ondansetron 4 mg disintegrating tablet   Place 1 tablet 3 times a day by oral route as needed. sevelamer (RENVELA) 800 MG tablet sevelamer carbonate 800 mg tablet   TAKE 1 TABLET BY MOUTH THREE TIMES DAILY WITH MEALS      torsemide (DEMADEX) 100 MG tablet torsemide 100 mg tablet   Take 1 tablet by oral route. aspirin 81 MG EC tablet Take by mouth daily      Ferrous Fumarate 325 (106 Fe) MG TABS Take by mouth      gabapentin (NEURONTIN) 100 MG capsule Take 100 mg by mouth daily. insulin lispro (HUMALOG) 100 UNIT/ML injection vial Inject 100 Units into the skin      insulin NPH (HUMULIN N;NOVOLIN N) 100 UNIT/ML injection vial Inject into the skin      lisinopril (PRINIVIL;ZESTRIL) 40 MG tablet Take 40 mg by mouth daily      traMADol (ULTRAM) 50 MG tablet Take 50 mg by mouth every 6 hours as needed. No current facility-administered medications on file prior to encounter. REVIEW OF SYSTEMS  The patient has no difficulty with chest pain or shortness of breath. No fever or chills. Comprehensive review of systems was otherwise unremarkable except as noted above. Objective:   Physical Exam:   Recent vitals (if inpt):  Patient Vitals for the past 24 hrs:   BP Temp Temp src Pulse Resp SpO2 Height Weight   08/11/22 0954 (!) 148/64 97.7 °F (36.5 °C) Oral (!) 46 18 98 % 5' 4\" (1.626 m) 169 lb 6.4 oz (76.8 kg)         BP (!) 148/64   Pulse (!) 46   Temp 97.7 °F (36.5 °C) (Oral)   Resp 18   Ht 5' 4\" (1.626 m)   Wt 169 lb 6.4 oz (76.8 kg)   SpO2 98%   BMI 29.08 kg/m²   Wt Readings from Last 3 Encounters:   08/11/22 169 lb 6.4 oz (76.8 kg)   07/14/22 170 lb (77.1 kg)   06/16/22 174 lb (78.9 kg)     Constitutional: Alert, oriented, cooperative patient in no acute distress; appears stated age;  General appearance is within normal limits for wound care patient population. See the today's recorded vitals signs and constitutional data.   Eyes: Pupils equal; Sclera are clear. EOMs intact; The eyes appear to track and move normally. The sclera are not injected. The conjunctive are clear. The eyelids are normal. There is no scleral icterus. ENMT: There are no obvious external ear, nose, lip or mouth lesions. Nares normal; Neck: Overall contour of the neck is normal with no obvious neck masses. Gross hearing is within normal limits. No obvious neck masses  Resp:  Breathing is non-labored; normal rate and effort; no audible wheezing. CV: RRR;  no JVD; No evidence of cyanosis of the upper extremities. The extremities are perfused without embolic sign, splinter hemorrhages, or petechia. GI: soft and non-distended without acute abnormality noted. Musculoskeletal: unremarkable with normal function. No embolic signs or cyanosis. Neuro:  Oriented; moves all 4; no focal deficits  Psychiatric: Judgement and insight are within normal limits for the wound care population of patients. Patient is oriented to person, place, and time. Recent and remote memory are within normal limits. Mood and affect are within normal limits. Integumentary (Skin/Wounds)  See inspection of wound(s) below. There are no other skin areas of palpable concern. See wound center documentation including photos in the [address] Wound Template made part of this record by reference.          Wound Care Documentation:    Wound 03/03/22 Sacrum #1 (Active)   Wound Image   08/11/22 1008   Wound Etiology Pressure Stage 3 08/11/22 1008   Dressing Status Old drainage noted 08/11/22 1008   Wound Cleansed Cleansed with saline 08/11/22 1008   Dressing/Treatment Collagen;ABD;Tape/Soft cloth adhesive tape 08/11/22 1008   Wound Length (cm) 1.5 cm 08/11/22 1008   Wound Width (cm) 0.8 cm 08/11/22 1008   Wound Depth (cm) 0.3 cm 08/11/22 1008   Wound Surface Area (cm^2) 1.2 cm^2 08/11/22 1008   Change in Wound Size % (l*w) 85 08/11/22 1008   Wound Volume (cm^3) 0.36 cm^3 08/11/22 1008   Wound Healing % 91 08/11/22 1008   Wound Assessment Granulation tissue; Epithelialization 08/11/22 1008   Drainage Amount Small 08/11/22 1008   Drainage Description Serous 08/11/22 1008   Odor None 08/11/22 1008   Carola-wound Assessment Intact 08/11/22 1008   Margins Epibole (rolled edges) 08/11/22 1008   Wound Thickness Description not for Pressure Injury Full thickness 08/11/22 1008   Number of days: 160                                                 Amount and/or Complexity of Data Reviewed and Analyzed:  I reviewed and analyzed all of the unique labs and radiologic studies that are shown below as well as any that are in the HPI, and any that are in the expanded problem list below  *Each unique test, order, or document contributes to the combination of 2 or combination of 3 in Category 1 below. I also independently reviewed radiology images for studies I described above in the HPI or studies I have ordered. For this visit I also reviewed old records and prior notes. No results for input(s): WBC, HGB, PLT, NA, K, CL, CO2, BUN, CREA, GLU, INR, APTT, ALT, AML, AML, LCAD, PCO2, PO2, HCO3 in the last 72 hours. Invalid input(s): PTP, TBIL, TBILI, CBIL, SGOT, GPT, AP, LPSE, NH4, TROPT, TROIQ,  PH  No results for input(s): INR, APTT, ALT, AML in the last 72 hours.     Invalid input(s): PTP, TBIL, TBILI, CBIL, SGOT, GPT, AP, LPSE    Most recent blood counts (CBC) review  Lab Results   Component Value Date    WBC 9.6 02/03/2022    HGB 6.7 (LL) 02/03/2022    HCT 21.7 (L) 02/03/2022    MCV 90.0 02/03/2022     02/03/2022     Most recent chemistry (BMP) test review   Lab Results   Component Value Date/Time     08/23/2021 11:01 AM    K 4.7 08/23/2021 11:01 AM     08/23/2021 11:01 AM    CO2 27 08/23/2021 11:01 AM    BUN 64 08/23/2021 11:01 AM    CREATININE 10.40 08/23/2021 11:01 AM    GLUCOSE 162 08/23/2021 11:01 AM    CALCIUM 8.3 08/23/2021 11:01 AM      Most recent Liver enzyme test review  No results found for: ALT, with a #15 scalpel on 3/3/22. Stop Dakin's solution and initiation of saline moistened gauze Kerlix packing into the sacral wound BID + after each bowel movement    On 6/16/22 we converted to Puracol Plus dressing changes    I previously recommended discontinuation of fecal management system as it has been in place for >1mo and she is having formed stool coming out around the catheter. Recommend nutritional consultation for high-protein supplements  Recommend level 2 low air loss mattress. Recommend frequent turning side to side. See us q3-4 wks or sooner if needed    She has home health visits approximately once a week.          Anemia    Hgb 6.7 on 2/3/22   Follow-up LMD         BMI >29/Obesity   Encourage weight loss                Wound Care  Orders Placed This Encounter   Procedures    Initiate Outpatient Wound Care Protocol     Cleanse wound with saline    If wound contains bioburden or contamination cleanse with wound cleanser or antimicrobial solution     For normal periwound tissue without irritation nor maceration, apply topical skin protectant    For periwound tissue with irritation and/or maceration, apply zinc based product, topical steroid cream/ointment, or equivalent     For wounds with dry firm black eschar and/or without exudate, apply betadine and leave open to air      For wounds with scant/small to no exudate or drainage, apply wound gel, hydrocolloid, polymer, or equivalent and cover with secondary dressing/foam      For wounds with moderate/large exudate or drainage, apply alginate, hydrofiber, polymer, or equivalent and cover with secondary dressing/foam    For wounds with nonviable tissue requiring removal, apply chemical or mechanical debrider and cover with secondary dressing/foam    For wounds with tunneling, dead space, or cavity, fill or pack with strip/gauze/kerlex to fit and cover with secondary dressing/foam    For wounds with adequate granulation or epithelization, apply wound gel, hydrocolloid, polymer, collagen, or transparent film, and cover secondary dry dressing/foam    For wounds that need additional secondary dressing to help pad or control additional drainage/exudates, add foam, absorbent pad or hydrocolloid    For wounds with suspected or known infection, apply antimicrobial mesh and/or antimicrobial alginate/hydrofiber, or antimicrobial solution moistened gauze/kerlex, or equivalent and cover with secondary dressing/foam    Compression Management needed for edema control, apply multilayer compression or tubular garment or equivalent    Offloading Management needed for pressure relief, apply offloading shoe/boot or equivalent     Standing Status:   Standing     Number of Occurrences:   1         [unfilled]            Level of MDM (2/3 elements below)  Number and Complexity of Problems Addressed Amount and/or Complexity of Data to be Reviewed and Analyzed  *Each unique test, order, or document contributes to the combination of 2 or combination of 3 in Category 1 below. Risk of Complications and/or Morbidity or Mortality of pt Management     92799  08277 SF Minimal  ?1self-limited or minor problem Minimal or none Minimal risk of morbidity from additional diagnostic testing or Rx   32913  74635 Low Low  ? 2or more self-limited or minor problems;    or  ? 1stable chronic illness;    or  ?1acute, uncomplicated illness or injury   Limited  (Must meet the requirements of at least 1 of the 2 categories)  Category 1: Tests and documents   ? Any combination of 2 from the following:  ?Review of prior external note(s) from each unique source*;  ?review of the result(s) of each unique test*;   ?ordering of each unique test*    or   Category 2: Assessment requiring an independent historian(s)  (For the categories of independent interpretation of tests and discussion of management or test interpretation, see moderate or high) Low risk of morbidity from additional diagnostic testing or treatment     76460  85353 Mod Moderate  ? 1or more chronic illnesses with exacerbation, progression, or side effects of treatment;    or  ?2or more stable chronic illnesses;    or  ?1undiagnosed new problem with uncertain prognosis;    or  ?1acute illness with systemic symptoms;    or  ?1acute complicated injury   Moderate  (Must meet the requirements of at least 1 out of 3 categories)  Category 1: Tests, documents, or independent historian(s)  ? Any combination of 3 from the following:   ?Review of prior external note(s) from each unique source*;  ?Review of the result(s) of each unique test*;  ?Ordering of each unique test*;  ?Assessment requiring an independent historian(s)    or  Category 2: Independent interpretation of tests   ? Independent interpretation of a test performed by another physician/other qualified health care professional (not separately reported);     or  Category 3: Discussion of management or test interpretation  ? Discussion of management or test interpretation with external physician/other qualified health care professional/appropriate source (not separately reported)   Moderate risk of morbidity from additional diagnostic testing or treatment  Examples only:  ?Prescription drug management - advanced wound care prescription wound care products- purachol plus started 6/16/22  (eg Iodosorb, hydrofera, silvadene, collagen, puracol plus, optiloc, biologics - placenta, Theraskin, Apligraf). Note also that if home health care is involved, they will not apply topical therapies without a prescription/order from physician      ? Decision regarding minor surgery with identified patient or procedure risk factors  ? Decision regarding elective major surgery without identified patient or procedure risk factors   ? Diagnosis or treatment significantly limited by social determinants of health       45942  08559 High High  ? 1or more chronic illnesses with severe exacerbation, progression, or side effects of treatment;    or  ?1 acute or chronic illness or injury that poses a threat to life or bodily function   Extensive  (Must meet the requirements of at least 2 out of 3 categories)  Category 1: Tests, documents, or independent historian(s)  ? Any combination of 3 from the following:   ?Review of prior external note(s) from each unique source*;  ?Review of the result(s) of each unique test*;   ?Ordering of each unique test*;   ?Assessment requiring an independent historian(s)    or   Category 2: Independent interpretation of tests   ? Independent interpretation of a test performed by another physician/other qualified health care professional (not separately reported);     or  Category 3: Discussion of management or test interpretation  ? Discussion of management or test interpretation with external physician/other qualified health care professional/appropriate source (not separately reported)   High risk of morbidity from additional diagnostic testing or treatment  Examples only:  ?Drug therapy requiring intensive monitoring for toxicity  ? Decision regarding elective major surgery with identified patient or procedure risk factors  ? Decision regarding emergency major surgery  ? Decision regarding hospitalization  ? Decision not to resuscitate or to de-escalate care because of poor prognosis                 I have personally performed a face-to-face diagnostic evaluation and management  service on this patient. I have independently seen the patient. I have independently obtained the above history from the patient/family. I have independently examined the patient with above findings. I have independently reviewed data/labs for this patient and developed the above plan of care (MDM).       Electronically signed by Adis Cabezas MD on 8/11/2022 at 10:41 AM

## 2022-08-11 NOTE — FLOWSHEET NOTE
08/11/22 1008   Wound 03/03/22 Sacrum #1   Date First Assessed/Time First Assessed: 03/03/22 1103   Present on Hospital Admission: Yes  Primary Wound Type: Pressure Injury  Location: Sacrum  Wound Description (Comments): #1   Wound Image    Wound Etiology Pressure Stage 3   Dressing Status Old drainage noted   Wound Cleansed Cleansed with saline   Dressing/Treatment Collagen;ABD;Tape/Soft cloth adhesive tape   Wound Length (cm) 1.5 cm   Wound Width (cm) 0.8 cm   Wound Depth (cm) 0.3 cm   Wound Surface Area (cm^2) 1.2 cm^2   Change in Wound Size % (l*w) 85   Wound Volume (cm^3) 0.36 cm^3   Wound Healing % 91   Wound Assessment Granulation tissue; Epithelialization   Drainage Amount Small   Drainage Description Serous   Odor None   Carola-wound Assessment Intact   Margins Epibole (rolled edges)   Wound Thickness Description not for Pressure Injury Full thickness   Pain Assessment   Pain Assessment None - Denies Pain   Patient is currently taking Aspirin 81 mg

## 2022-09-15 ENCOUNTER — HOSPITAL ENCOUNTER (OUTPATIENT)
Dept: WOUND CARE | Age: 72
Discharge: HOME | End: 2022-09-15
Payer: MEDICARE

## 2022-09-15 VITALS
DIASTOLIC BLOOD PRESSURE: 71 MMHG | HEART RATE: 56 BPM | RESPIRATION RATE: 18 BRPM | SYSTOLIC BLOOD PRESSURE: 146 MMHG | HEIGHT: 64 IN | BODY MASS INDEX: 29.02 KG/M2 | TEMPERATURE: 97 F | WEIGHT: 170 LBS

## 2022-09-15 PROCEDURE — 99213 OFFICE O/P EST LOW 20 MIN: CPT

## 2022-09-15 PROCEDURE — 99214 OFFICE O/P EST MOD 30 MIN: CPT | Performed by: SURGERY

## 2022-09-15 NOTE — PROGRESS NOTES
[facility] [address]    [address] Located within [facility] [address] [place], [place]  Consult Note/H&P/Progress Note      [facility]:  [id number]  AGE: 74 y.o. RACE Black /   GENDER: female  : 1950  EPISODE DATE:  9/15/2022       Subjective:      CC (Chief Complaint) and HISTORY of PRESENT ILLNESS (HPI):    Paul Marroquin is a 74 y.o. female who presents today for wound/ulcer evaluation. She came in for her first visit with us on 3/3/22. She is seeing us for a large stage IV sacral pressure ulcer   She was having packing change performed with Dakin solution prior to wound center enrollment. She had a history of septic shock related to a dialysis catheter in early 2022. This resulted in hospitalization   She was transferred to skilled nursing facility on 2022. She is brought back to the ER on 2022 with a stage II sacral pressure ulcer which has progressed to stage IV.    She had a wound VAC at one point which could not get a seal.   She had C. difficile infection and has had a fecal management system in place for more than a month by her report                his information was obtained from the patient  The following HPI elements were documented for the patient's wound:  Location: Large  stage IV sacral pressure ulcer  Duration: Since approximately early 2022   Severity: Severe   Context:     Modifying Factors:  Nothing makes better or worse   Quality:     Timing:     Associated Signs and Symptoms : No recent fevers         Ulcer Identification:   Ulcer Type: pressure      Contributing Factors: chronic pressure, decreased mobility, incontinence of stool and obesity          PAST MEDICAL HISTORY  Past Medical History:   Diagnosis Date    Anemia     Arrhythmia     patient states she has a heart murmur,Echo 10/12/2021 EF 55-65% sees [facility]    CAD (coronary artery disease)     cardiac cath, stent x 1    Cancer ([facility] [address] uterine    Chronic kidney disease     2018    Diabetes ([facility] [address]     typell, avgfbs 95, last A1C was 7.0 on 06/27/2021 s/sx of hypo below 60    Dialysis patient [facility])     M,W,F    Heart failure ([facility] [address]     Echo 10/12/2021 on Entresto 55-65%, improved from 6/27/2021 Echo 25%    Hypercholesterolemia     Hypertension     Morbid obesity ([facility] [address]     Nausea & vomiting         PAST SURGICAL HISTORY  Past Surgical History:   Procedure Laterality Date    COLONOSCOPY      DILATION AND CURETTAGE OF UTERUS      HYSTERECTOMY (CERVIX STATUS UNKNOWN)      IR TUNNELED CATHETER PLACEMENT GREATER THAN 5 YEARS  8/23/2021    right chest    IR TUNNELED CATHETER PLACEMENT GREATER THAN 5 YEARS  8/23/2021    IR TUNNELED CATHETER PLACEMENT GREATER THAN 5 YEARS 8/23/2021 [facility] RADIOLOGY SPECIALS    VASCULAR SURGERY Left 05/30/2018    AVF creation    VASCULAR SURGERY Right 12/01/2021    Creation of right upper arm brachial axillary loop graft       FAMILY HISTORY  Family History   Problem Relation Age of Onset    Hypertension Mother     Stroke Mother     Heart Disease Father         MI    Diabetes Brother     Heart Disease Brother     Heart Disease Brother     Heart Disease Sister     Hypertension Father        SOCIAL HISTORY  Social History     Tobacco Use    Smoking status: Never    Smokeless tobacco: Never   Substance Use Topics    Alcohol use: No    Drug use: No       ALLERGIES  No Known Allergies    MEDICATIONS  Current Outpatient Medications on File Prior to Encounter   Medication Sig Dispense Refill    cloNIDine (CATAPRES) 0.2 MG tablet       metoprolol succinate (TOPROL XL) 25 MG extended release tablet Take 25 mg by mouth in the morning.       cinacalcet (SENSIPAR) 30 MG tablet every other day      acetaminophen (TYLENOL) 500 MG tablet 1 tablet EVERY 6 HOURS (route: oral)      amLODIPine (NORVASC) 5 MG tablet Take 5 mg by mouth daily      B Complex-C-Folic Acid (NEPHRO-DINAH) 0.8 MG TABS Take 1 tablet by mouth daily      ondansetron (ZOFRAN-ODT) 4 MG disintegrating tablet ondansetron 4 mg disintegrating tablet   Place 1 tablet 3 times a day by oral route as needed. sevelamer (RENVELA) 800 MG tablet sevelamer carbonate 800 mg tablet   TAKE 1 TABLET BY MOUTH THREE TIMES DAILY WITH MEALS      torsemide (DEMADEX) 100 MG tablet torsemide 100 mg tablet   Take 1 tablet by oral route. aspirin 81 MG EC tablet Take by mouth daily      Ferrous Fumarate 325 (106 Fe) MG TABS Take by mouth      gabapentin (NEURONTIN) 100 MG capsule Take 100 mg by mouth daily. insulin lispro (HUMALOG) 100 UNIT/ML injection vial Inject 100 Units into the skin      insulin NPH (HUMULIN N;NOVOLIN N) 100 UNIT/ML injection vial Inject into the skin      lisinopril (PRINIVIL;ZESTRIL) 40 MG tablet Take 40 mg by mouth daily      traMADol (ULTRAM) 50 MG tablet Take 50 mg by mouth every 6 hours as needed. No current facility-administered medications on file prior to encounter. REVIEW OF SYSTEMS  The patient has no difficulty with chest pain or shortness of breath. No fever or chills. Comprehensive review of systems was otherwise unremarkable except as noted above. Objective:   Physical Exam:   Recent vitals (if inpt):  Patient Vitals for the past 24 hrs:   BP Temp Temp src Pulse Resp Height Weight   09/15/22 1001 -- -- -- -- -- 5' 4\" (1.626 m) 170 lb (77.1 kg)   09/15/22 0951 (!) 146/71 97 °F (36.1 °C) Temporal 56 18 -- --           BP (!) 146/71   Pulse 56   Temp 97 °F (36.1 °C) (Temporal)   Resp 18   Ht 5' 4\" (1.626 m)   Wt 170 lb (77.1 kg)   BMI 29.18 kg/m²   Wt Readings from Last 3 Encounters:   09/15/22 170 lb (77.1 kg)   08/11/22 169 lb 6.4 oz (76.8 kg)   07/14/22 170 lb (77.1 kg)     Constitutional: Alert, oriented, cooperative patient in no acute distress; appears stated age;  General appearance is within normal limits for wound care patient population. See the today's recorded vitals signs and constitutional data.   Eyes: Pupils equal; Sclera are clear. EOMs intact; The eyes appear to track and move normally. The sclera are not injected. The conjunctive are clear. The eyelids are normal. There is no scleral icterus. ENMT: There are no obvious external ear, nose, lip or mouth lesions. Nares normal; Neck: Overall contour of the neck is normal with no obvious neck masses. Gross hearing is within normal limits. No obvious neck masses  Resp:  Breathing is non-labored; normal rate and effort; no audible wheezing. CV: RRR;  no JVD; No evidence of cyanosis of the upper extremities. The extremities are perfused without embolic sign, splinter hemorrhages, or petechia. GI: soft and non-distended without acute abnormality noted. Musculoskeletal: unremarkable with normal function. No embolic signs or cyanosis. Neuro:  Oriented; moves all 4; no focal deficits  Psychiatric: Judgement and insight are within normal limits for the wound care population of patients. Patient is oriented to person, place, and time. Recent and remote memory are within normal limits. Mood and affect are within normal limits. Integumentary (Skin/Wounds)  See inspection of wound(s) below. There are no other skin areas of palpable concern. See wound center documentation including photos in the [address] Wound Template made part of this record by reference.          Wound Care Documentation:    Wound 03/03/22 Sacrum #1 (Active)   Wound Image   09/15/22 0956   Wound Etiology Pressure Stage 3 09/15/22 0956   Dressing Status Intact 09/15/22 0956   Wound Cleansed Cleansed with saline 09/15/22 0956   Dressing/Treatment Gauze dressing/dressing sponge 09/15/22 0956   Wound Length (cm) 0.5 cm 09/15/22 0956   Wound Width (cm) 0.2 cm 09/15/22 0956   Wound Depth (cm) 0.1 cm 09/15/22 0956   Wound Surface Area (cm^2) 0.1 cm^2 09/15/22 0956   Change in Wound Size % (l*w) 98.75 09/15/22 0956   Wound Volume (cm^3) 0.01 cm^3 09/15/22 0956   Wound Healing % 100 09/15/22 0956   Wound Assessment Epithelialization 09/15/22 0956   Drainage Amount Scant 09/15/22 0956   Drainage Description Serous 09/15/22 0956   Odor None 09/15/22 0956   Carola-wound Assessment Intact 09/15/22 0956   Margins Epibole (rolled edges) 08/11/22 1008   Wound Thickness Description not for Pressure Injury Full thickness 09/15/22 0956   Number of days: 195                                                    Amount and/or Complexity of Data Reviewed and Analyzed:  I reviewed and analyzed all of the unique labs and radiologic studies that are shown below as well as any that are in the HPI, and any that are in the expanded problem list below  *Each unique test, order, or document contributes to the combination of 2 or combination of 3 in Category 1 below. I also independently reviewed radiology images for studies I described above in the HPI or studies I have ordered. For this visit I also reviewed old records and prior notes. No results for input(s): WBC, HGB, PLT, NA, K, CL, CO2, BUN, CREA, GLU, INR, APTT, ALT, AML, AML, LCAD, PCO2, PO2, HCO3 in the last 72 hours. Invalid input(s): PTP, TBIL, TBILI, CBIL, SGOT, GPT, AP, LPSE, NH4, TROPT, TROIQ,  PH  No results for input(s): INR, APTT, ALT, AML in the last 72 hours.     Invalid input(s): PTP, TBIL, TBILI, CBIL, SGOT, GPT, AP, LPSE    Most recent blood counts (CBC) review  Lab Results   Component Value Date    WBC 9.6 02/03/2022    HGB 6.7 (LL) 02/03/2022    HCT 21.7 (L) 02/03/2022    MCV 90.0 02/03/2022     02/03/2022     Most recent chemistry (BMP) test review   Lab Results   Component Value Date/Time     08/23/2021 11:01 AM    K 4.7 08/23/2021 11:01 AM     08/23/2021 11:01 AM    CO2 27 08/23/2021 11:01 AM    BUN 64 08/23/2021 11:01 AM    CREATININE 10.40 08/23/2021 11:01 AM    GLUCOSE 162 08/23/2021 11:01 AM    CALCIUM 8.3 08/23/2021 11:01 AM      Most recent Liver enzyme test review  No results found for: ALT, AST, GGT, ALKPHOS, BILITOT  Most recent coagulation (coags) review  @lastinr@  No results found for: INR, APTT, AML, AML, LCAD    Diabetic assessment  No results found for: LABA1C  No results found for: EAG    Nutritional assessment screen to assess wound healing ability:  No results found for: LABPROT, LABALBU  No results found for: TP, ALBR, ALB    Xray Result (most recent):  No results found for this or any previous visit from the past 3650 days. CT Result (most recent):  No results found for this or any previous visit from the past 3650 days. Admission date (for inpatients): 9/15/2022   Day of Surgery  * No procedures listed *    Assessment:      Problem List Items Addressed This Visit    None      [unfilled]    Active Problems:    BMI 29.0-29.9,adult    Stage 4 skin ulcer of sacral region [facility])  Resolved Problems:    * No resolved hospital problems. *      Patient Active Problem List    Diagnosis Date Noted    BMI 29.0-29.9,adult 08/11/2022     Priority: Medium    Stage 4 skin ulcer of sacral region ([facility] [address] 03/03/2022    Other specified anemias 03/03/2022    Fecal incontinence 03/03/2022    C. difficile colitis 03/03/2022    Obesity, morbid ([address] 05/23/2018           Plan:     Number and Complexity of Problems addressed and   Risks of complications and/or morbidity of management    Treatment Note please see attached Discharge Instructions  Any procedures done today in the wound center (if documented in a separate note) in [facility] are made part of this record by reference  Written patient dismissal instructions given to patient or POA. Large stage IV sacral pressure ulcer  I sharply and selectively debrided this with a #15 scalpel on 3/3/22.   Stop Dakin's solution and initiation of saline moistened gauze Kerlix packing into the sacral wound BID + after each bowel movement    On 6/16/22 we converted to Puracol Plus dressing changes    I previously recommended discontinuation of fecal management system as it has been in place for >1mo and she is having formed stool coming out around the catheter. Recommend nutritional consultation for high-protein supplements  Recommend level 2 low air loss mattress. Recommend frequent turning side to side. See us q3-4 wks or sooner if needed    She has home health visits approximately once a week. Anemia    Hgb 6.7 on 2/3/22   Follow-up LMD         BMI >29/Obesity   Encourage weight loss                Wound Care  No orders of the defined types were placed in this encounter. [unfilled]            Level of MDM (2/3 elements below)  Number and Complexity of Problems Addressed Amount and/or Complexity of Data to be Reviewed and Analyzed  *Each unique test, order, or document contributes to the combination of 2 or combination of 3 in Category 1 below. Risk of Complications and/or Morbidity or Mortality of pt Management     86768  23870 SF Minimal  ?1self-limited or minor problem Minimal or none Minimal risk of morbidity from additional diagnostic testing or Rx   26046  02992 Low Low  ? 2or more self-limited or minor problems;    or  ? 1stable chronic illness;    or  ?1acute, uncomplicated illness or injury   Limited  (Must meet the requirements of at least 1 of the 2 categories)  Category 1: Tests and documents   ? Any combination of 2 from the following:  ?Review of prior external note(s) from each unique source*;  ?review of the result(s) of each unique test*;   ?ordering of each unique test*    or   Category 2: Assessment requiring an independent historian(s)  (For the categories of independent interpretation of tests and discussion of management or test interpretation, see moderate or high) Low risk of morbidity from additional diagnostic testing or treatment     51710  04555 Mod Moderate  ? 1or more chronic illnesses with exacerbation, progression, or side effects of treatment;    or  ?2or more stable chronic illnesses;    or  ?1undiagnosed new problem with uncertain prognosis;    or  ?1acute illness with systemic symptoms;    or  ?1acute complicated injury   Moderate  (Must meet the requirements of at least 1 out of 3 categories)  Category 1: Tests, documents, or independent historian(s)  ? Any combination of 3 from the following:   ?Review of prior external note(s) from each unique source*;  ?Review of the result(s) of each unique test*;  ?Ordering of each unique test*;  ?Assessment requiring an independent historian(s)    or  Category 2: Independent interpretation of tests   ? Independent interpretation of a test performed by another physician/other qualified health care professional (not separately reported);     or  Category 3: Discussion of management or test interpretation  ? Discussion of management or test interpretation with external physician/other qualified health care professional/appropriate source (not separately reported)   Moderate risk of morbidity from additional diagnostic testing or treatment  Examples only:  ?Prescription drug management - advanced wound care prescription wound care products- purachol plus started 6/16/22  (eg Iodosorb, hydrofera, silvadene, collagen, puracol plus, optiloc, biologics - placenta, Theraskin, Apligraf). Note also that if home health care is involved, they will not apply topical therapies without a prescription/order from physician      ? Decision regarding minor surgery with identified patient or procedure risk factors  ? Decision regarding elective major surgery without identified patient or procedure risk factors   ? Diagnosis or treatment significantly limited by social determinants of health       [id number] High High  ? 1or more chronic illnesses with severe exacerbation, progression, or side effects of treatment;    or  ?1 acute or chronic illness or injury that poses a threat to life or bodily function   Extensive  (Must meet the requirements of at least 2 out of 3 categories)  Category 1: Tests, documents, or independent historian(s)  ? Any combination of 3 from the following:   ?Review of prior external note(s) from each unique source*;  ?Review of the result(s) of each unique test*;   ?Ordering of each unique test*;   ?Assessment requiring an independent historian(s)    or   Category 2: Independent interpretation of tests   ? Independent interpretation of a test performed by another physician/other qualified health care professional (not separately reported);     or  Category 3: Discussion of management or test interpretation  ? Discussion of management or test interpretation with external physician/other qualified health care professional/appropriate source (not separately reported)   High risk of morbidity from additional diagnostic testing or treatment  Examples only:  ?Drug therapy requiring intensive monitoring for toxicity  ? Decision regarding elective major surgery with identified patient or procedure risk factors  ? Decision regarding emergency major surgery  ? Decision regarding hospitalization  ? Decision not to resuscitate or to de-escalate care because of poor prognosis                 I have personally performed a face-to-face diagnostic evaluation and management  service on this patient. I have independently seen the patient. I have independently obtained the above history from the patient/family. I have independently examined the patient with above findings. I have independently reviewed data/labs for this patient and developed the above plan of care (MDM).       Electronically signed by Kyleigh Corrales MD on 9/15/2022 at 10:11 AM

## 2022-09-15 NOTE — WOUND CARE
Discharge Instructions for  Esteban Bain  [address] [facility] [address]  [place] , [address]  Phone [phone]   Fax [phone]      NAME:  Dave Richardson  YOB: 1950  MEDICAL RECORD NUMBER:  [id number]  DATE:  9/15/2022    Return Appointment:   1 month with Tessa Rain MD      Instructions: Sacral Wound:  Cleanse wound with normal saline. Collagen (preferably Puracol Plus) to wound base, cut to size. Fill remaining space with dry gauze. Cover with ABD pads and paper tape or bordered foam.  Change dressing 3 times per week. Home Health for dressing changes 3 times week     Avoid back lying. Change position every 2 hours alternating side to side. Incorporate \"forward sitting\" while in regular chair to offload pressure from wound site. Continue to use air mattress to offload sacrum while in bed. Continue to use offloading cushion/pillow in recliner during dialysis. Increase ambulation/walking at home, decreasing sitting time. Should you experience increased redness, swelling, pain, foul odor, size of wound(s), or have a temperature over 101 degrees please contact the [address] [place] [address] Road at [phone] or if after hours contact your primary care physician or go to the hospital emergency department. PLEASE NOTE: IF YOU ARE UNABLE TO OBTAIN WOUND SUPPLIES, CONTINUE TO USE THE SUPPLIES YOU HAVE AVAILABLE UNTIL YOU ARE ABLE TO REACH US. IT IS MOST IMPORTANT TO KEEP THE WOUND COVERED AT ALL TIMES.     Electronically signed Bunny LYNETTE Walsh on 9/15/2022 at 10:13 AM

## 2022-09-15 NOTE — FLOWSHEET NOTE
09/15/22 0956   Wound 03/03/22 Sacrum #1   Date First Assessed/Time First Assessed: 03/03/22 1103   Present on Hospital Admission: Yes  Primary Wound Type: Pressure Injury  Location: Sacrum  Wound Description (Comments): #1   Wound Image    Wound Etiology Pressure Stage 3   Dressing Status Intact   Wound Cleansed Cleansed with saline   Dressing/Treatment Gauze dressing/dressing sponge   Wound Length (cm) 0.5 cm   Wound Width (cm) 0.2 cm   Wound Depth (cm) 0.1 cm   Wound Surface Area (cm^2) 0.1 cm^2   Change in Wound Size % (l*w) 98.75   Wound Volume (cm^3) 0.01 cm^3   Wound Healing % 100   Wound Assessment Epithelialization   Drainage Amount Scant   Drainage Description Serous   Odor None   Carola-wound Assessment Intact   Wound Thickness Description not for Pressure Injury Full thickness   Patient is taking aspirin

## 2022-10-13 ENCOUNTER — HOSPITAL ENCOUNTER (OUTPATIENT)
Dept: WOUND CARE | Age: 72
Discharge: HOME | End: 2022-10-13
Payer: MEDICARE

## 2022-10-13 VITALS
HEART RATE: 60 BPM | RESPIRATION RATE: 18 BRPM | SYSTOLIC BLOOD PRESSURE: 159 MMHG | HEIGHT: 64 IN | OXYGEN SATURATION: 97 % | WEIGHT: 168 LBS | TEMPERATURE: 98.5 F | DIASTOLIC BLOOD PRESSURE: 69 MMHG | BODY MASS INDEX: 28.68 KG/M2

## 2022-10-13 PROCEDURE — 99213 OFFICE O/P EST LOW 20 MIN: CPT

## 2022-10-13 PROCEDURE — 99214 OFFICE O/P EST MOD 30 MIN: CPT | Performed by: SURGERY

## 2022-10-13 NOTE — PROGRESS NOTES
[address]  Consult Note/H&P/Progress Note      [address] [facility] RECORD NUMBER:  [id number]  AGE: 74 y.o. RACE Black /   GENDER: female  : 1950  EPISODE DATE:  10/13/2022       Subjective:      CC (Chief Complaint) and HISTORY of PRESENT ILLNESS (HPI):    Kanchan Thompson is a 74 y.o. female who presents today for wound/ulcer evaluation. She came in for her first visit with us on 3/3/22. She is seeing us for a large stage IV sacral pressure ulcer   She was having packing change performed with Dakin solution prior to wound center enrollment. She had a history of septic shock related to a dialysis catheter in early 2022. This resulted in hospitalization   She was transferred to skilled nursing facility on 2022. She is brought back to the ER on 2022 with a stage II sacral pressure ulcer which has progressed to stage IV.    She had a wound VAC at one point which could not get a seal.   She had C. difficile infection and has had a fecal management system in place for more than a month by her report                his information was obtained from the patient  The following HPI elements were documented for the patient's wound:  Location: Large  stage IV sacral pressure ulcer  Duration: Since approximately early 2022   Severity: Severe   Context:     Modifying Factors:  Nothing makes better or worse   Quality:     Timing:     Associated Signs and Symptoms : No recent fevers         Ulcer Identification:   Ulcer Type: pressure      Contributing Factors: chronic pressure, decreased mobility, incontinence of stool and obesity          PAST MEDICAL HISTORY  Past Medical History:   Diagnosis Date    Anemia     Arrhythmia     patient states she has a heart murmur,Echo 10/12/2021 EF 55-65% sees [facility]    CAD (coronary artery disease)     cardiac cath, stent x 1    Cancer ([facility] [address] uterine    Chronic kidney disease     2018    Diabetes ([facility] [address]     typell, avgfbs 95, last A1C was 7.0 on 06/27/2021 s/sx of hypo below 60    Dialysis patient [facility])     M,W,F    Heart failure ([facility] [address]     Echo 10/12/2021 on Entresto 55-65%, improved from 6/27/2021 Echo 25%    Hypercholesterolemia     Hypertension     Morbid obesity ([facility] [address]     Nausea & vomiting         PAST SURGICAL HISTORY  Past Surgical History:   Procedure Laterality Date    COLONOSCOPY      DILATION AND CURETTAGE OF UTERUS      HYSTERECTOMY (CERVIX STATUS UNKNOWN)      IR TUNNELED CATHETER PLACEMENT GREATER THAN 5 YEARS  8/23/2021    right chest    IR TUNNELED CATHETER PLACEMENT GREATER THAN 5 YEARS  8/23/2021    IR TUNNELED CATHETER PLACEMENT GREATER THAN 5 YEARS 8/23/2021 [facility] RADIOLOGY SPECIALS    VASCULAR SURGERY Left 05/30/2018    AVF creation    VASCULAR SURGERY Right 12/01/2021    Creation of right upper arm brachial axillary loop graft       FAMILY HISTORY  Family History   Problem Relation Age of Onset    Hypertension Mother     Stroke Mother     Heart Disease Father         MI    Diabetes Brother     Heart Disease Brother     Heart Disease Brother     Heart Disease Sister     Hypertension Father        SOCIAL HISTORY  Social History     Tobacco Use    Smoking status: Never    Smokeless tobacco: Never   Substance Use Topics    Alcohol use: No    Drug use: No       ALLERGIES  No Known Allergies    MEDICATIONS  Current Outpatient Medications on File Prior to Encounter   Medication Sig Dispense Refill    cloNIDine (CATAPRES) 0.2 MG tablet       metoprolol succinate (TOPROL XL) 25 MG extended release tablet Take 25 mg by mouth in the morning.       cinacalcet (SENSIPAR) 30 MG tablet every other day      acetaminophen (TYLENOL) 500 MG tablet 1 tablet EVERY 6 HOURS (route: oral)      amLODIPine (NORVASC) 5 MG tablet Take 5 mg by mouth daily      B Complex-C-Folic Acid (NEPHRO-DINAH) 0.8 MG TABS Take 1 tablet by mouth daily      ondansetron (ZOFRAN-ODT) 4 MG disintegrating tablet ondansetron 4 mg disintegrating tablet   Place 1 tablet 3 times a day by oral route as needed. sevelamer (RENVELA) 800 MG tablet sevelamer carbonate 800 mg tablet   TAKE 1 TABLET BY MOUTH THREE TIMES DAILY WITH MEALS      torsemide (DEMADEX) 100 MG tablet torsemide 100 mg tablet   Take 1 tablet by oral route. aspirin 81 MG EC tablet Take by mouth daily      Ferrous Fumarate 325 (106 Fe) MG TABS Take by mouth      gabapentin (NEURONTIN) 100 MG capsule Take 100 mg by mouth daily. insulin lispro (HUMALOG) 100 UNIT/ML injection vial Inject 100 Units into the skin      insulin NPH (HUMULIN N;NOVOLIN N) 100 UNIT/ML injection vial Inject into the skin      lisinopril (PRINIVIL;ZESTRIL) 40 MG tablet Take 40 mg by mouth daily      traMADol (ULTRAM) 50 MG tablet Take 50 mg by mouth every 6 hours as needed. No current facility-administered medications on file prior to encounter. REVIEW OF SYSTEMS  The patient has no difficulty with chest pain or shortness of breath. No fever or chills. Comprehensive review of systems was otherwise unremarkable except as noted above. Objective:   Physical Exam:   Recent vitals (if inpt):  Patient Vitals for the past 24 hrs:   BP Temp Temp src Pulse Resp SpO2 Height Weight   10/13/22 1036 (!) 159/69 98.5 °F (36.9 °C) Oral 60 18 97 % 5' 4\" (1.626 m) 168 lb (76.2 kg)             BP (!) 159/69   Pulse 60   Temp 98.5 °F (36.9 °C) (Oral)   Resp 18   Ht 5' 4\" (1.626 m)   Wt 168 lb (76.2 kg)   SpO2 97%   BMI 28.84 kg/m²   Wt Readings from Last 3 Encounters:   10/13/22 168 lb (76.2 kg)   09/15/22 170 lb (77.1 kg)   08/11/22 169 lb 6.4 oz (76.8 kg)     Constitutional: Alert, oriented, cooperative patient in no acute distress; appears stated age;  General appearance is within normal limits for wound care patient population. See the today's recorded vitals signs and constitutional data. Eyes: Pupils equal; Sclera are clear.  EOMs intact; The eyes appear to track and move normally. The sclera are not injected. The conjunctive are clear. The eyelids are normal. There is no scleral icterus. ENMT: There are no obvious external ear, nose, lip or mouth lesions. Nares normal; Neck: Overall contour of the neck is normal with no obvious neck masses. Gross hearing is within normal limits. No obvious neck masses  Resp:  Breathing is non-labored; normal rate and effort; no audible wheezing. CV: RRR;  no JVD; No evidence of cyanosis of the upper extremities. The extremities are perfused without embolic sign, splinter hemorrhages, or petechia. GI: soft and non-distended without acute abnormality noted. Musculoskeletal: unremarkable with normal function. No embolic signs or cyanosis. Neuro:  Oriented; moves all 4; no focal deficits  Psychiatric: Judgement and insight are within normal limits for the wound care population of patients. Patient is oriented to person, place, and time. Recent and remote memory are within normal limits. Mood and affect are within normal limits. Integumentary (Skin/Wounds)  See inspection of wound(s) below. There are no other skin areas of palpable concern. See wound center documentation including photos in the [address] Wound Template made part of this record by reference.          Wound Care Documentation:    Wound 03/03/22 Sacrum #1 (Active)   Wound Image   10/13/22 1023   Wound Etiology Pressure Stage 3 10/13/22 1023   Dressing Status Intact 10/13/22 1023   Wound Cleansed Cleansed with saline 10/13/22 1023   Dressing/Treatment Gauze dressing/dressing sponge 09/15/22 0956   Wound Length (cm) 0.3 cm 10/13/22 1023   Wound Width (cm) 0.1 cm 10/13/22 1023   Wound Depth (cm) 0.1 cm 10/13/22 1023   Wound Surface Area (cm^2) 0.03 cm^2 10/13/22 1023   Change in Wound Size % (l*w) 99.63 10/13/22 1023   Wound Volume (cm^3) 0.003 cm^3 10/13/22 1023   Wound Healing % 100 10/13/22 1023   Wound Assessment Epithelialization 09/15/22 0956   Drainage Amount Scant 09/15/22 0956   Drainage Description Serous 09/15/22 0956   Odor None 09/15/22 0956   Carola-wound Assessment Intact 09/15/22 0956   Margins Epibole (rolled edges) 08/11/22 1008   Wound Thickness Description not for Pressure Injury Full thickness 09/15/22 0956   Number of days: 223                                                      Amount and/or Complexity of Data Reviewed and Analyzed:  I reviewed and analyzed all of the unique labs and radiologic studies that are shown below as well as any that are in the HPI, and any that are in the expanded problem list below  *Each unique test, order, or document contributes to the combination of 2 or combination of 3 in Category 1 below. I also independently reviewed radiology images for studies I described above in the HPI or studies I have ordered. For this visit I also reviewed old records and prior notes. No results for input(s): WBC, HGB, PLT, NA, K, CL, CO2, BUN, CREA, GLU, INR, APTT, ALT, AML, AML, LCAD, PCO2, PO2, HCO3 in the last 72 hours. Invalid input(s): PTP, TBIL, TBILI, CBIL, SGOT, GPT, AP, LPSE, NH4, TROPT, TROIQ,  PH  No results for input(s): INR, APTT, ALT, AML in the last 72 hours.     Invalid input(s): PTP, TBIL, TBILI, CBIL, SGOT, GPT, AP, LPSE    Most recent blood counts (CBC) review  Lab Results   Component Value Date    WBC 9.6 02/03/2022    HGB 6.7 (LL) 02/03/2022    HCT 21.7 (L) 02/03/2022    MCV 90.0 02/03/2022     02/03/2022     Most recent chemistry (BMP) test review   Lab Results   Component Value Date/Time     08/23/2021 11:01 AM    K 4.7 08/23/2021 11:01 AM     08/23/2021 11:01 AM    CO2 27 08/23/2021 11:01 AM    BUN 64 08/23/2021 11:01 AM    CREATININE 10.40 08/23/2021 11:01 AM    GLUCOSE 162 08/23/2021 11:01 AM    CALCIUM 8.3 08/23/2021 11:01 AM      Most recent Liver enzyme test review  No results found for: ALT, AST, GGT, ALKPHOS, BILITOT  Most recent for >1mo and she is having formed stool coming out around the catheter. Recommend nutritional consultation for high-protein supplements  Recommend level 2 low air loss mattress. Recommend frequent turning side to side. See us q3-4 wks or sooner if needed    She has home health visits approximately once a week. Anemia    Hgb 6.7 on 2/3/22   Follow-up LMD         BMI >29/Obesity   Encourage weight loss                Wound Care  No orders of the defined types were placed in this encounter. [unfilled]            Level of MDM (2/3 elements below)  Number and Complexity of Problems Addressed Amount and/or Complexity of Data to be Reviewed and Analyzed  *Each unique test, order, or document contributes to the combination of 2 or combination of 3 in Category 1 below. Risk of Complications and/or Morbidity or Mortality of pt Management     32609  87211 SF Minimal  ?1self-limited or minor problem Minimal or none Minimal risk of morbidity from additional diagnostic testing or Rx   08548  08351 Low Low  ? 2or more self-limited or minor problems;    or  ? 1stable chronic illness;    or  ?1acute, uncomplicated illness or injury   Limited  (Must meet the requirements of at least 1 of the 2 categories)  Category 1: Tests and documents   ? Any combination of 2 from the following:  ?Review of prior external note(s) from each unique source*;  ?review of the result(s) of each unique test*;   ?ordering of each unique test*    or   Category 2: Assessment requiring an independent historian(s)  (For the categories of independent interpretation of tests and discussion of management or test interpretation, see moderate or high) Low risk of morbidity from additional diagnostic testing or treatment     39157  04500 Mod Moderate  ? 1or more chronic illnesses with exacerbation, progression, or side effects of treatment;    or  ?2or more stable chronic illnesses;    or  ?1undiagnosed new problem with uncertain prognosis;    or  ?1acute illness with systemic symptoms;    or  ?1acute complicated injury   Moderate  (Must meet the requirements of at least 1 out of 3 categories)  Category 1: Tests, documents, or independent historian(s)  ? Any combination of 3 from the following:   ?Review of prior external note(s) from each unique source*;  ?Review of the result(s) of each unique test*;  ?Ordering of each unique test*;  ?Assessment requiring an independent historian(s)    or  Category 2: Independent interpretation of tests   ? Independent interpretation of a test performed by another physician/other qualified health care professional (not separately reported);     or  Category 3: Discussion of management or test interpretation  ? Discussion of management or test interpretation with external physician/other qualified health care professional/appropriate source (not separately reported)   Moderate risk of morbidity from additional diagnostic testing or treatment  Examples only:  ?Prescription drug management - advanced wound care prescription wound care products- purachol plus started 6/16/22  (eg Iodosorb, hydrofera, silvadene, collagen, puracol plus, optiloc, biologics - placenta, Theraskin, Apligraf). Note also that if home health care is involved, they will not apply topical therapies without a prescription/order from physician      ? Decision regarding minor surgery with identified patient or procedure risk factors  ? Decision regarding elective major surgery without identified patient or procedure risk factors   ? Diagnosis or treatment significantly limited by social determinants of health       [id number] High High  ? 1or more chronic illnesses with severe exacerbation, progression, or side effects of treatment;    or  ?1 acute or chronic illness or injury that poses a threat to life or bodily function   Extensive  (Must meet the requirements of at least 2 out of 3 categories)  Category 1: Tests, documents, or independent historian(s)  ? Any combination of 3 from the following:   ?Review of prior external note(s) from each unique source*;  ?Review of the result(s) of each unique test*;   ?Ordering of each unique test*;   ?Assessment requiring an independent historian(s)    or   Category 2: Independent interpretation of tests   ? Independent interpretation of a test performed by another physician/other qualified health care professional (not separately reported);     or  Category 3: Discussion of management or test interpretation  ? Discussion of management or test interpretation with external physician/other qualified health care professional/appropriate source (not separately reported)   High risk of morbidity from additional diagnostic testing or treatment  Examples only:  ?Drug therapy requiring intensive monitoring for toxicity  ? Decision regarding elective major surgery with identified patient or procedure risk factors  ? Decision regarding emergency major surgery  ? Decision regarding hospitalization  ? Decision not to resuscitate or to de-escalate care because of poor prognosis                 I have personally performed a face-to-face diagnostic evaluation and management  service on this patient. I have independently seen the patient. I have independently obtained the above history from the patient/family. I have independently examined the patient with above findings. I have independently reviewed data/labs for this patient and developed the above plan of care (MDM).       Electronically signed by Ashlie Meraz MD on 10/13/2022 at 10:49 AM

## 2022-10-13 NOTE — DISCHARGE INSTRUCTIONS
Return Appointment:   3 weeks with Abhay Mendosa MD        Instructions: Sacral Wound:  Cleanse wound with normal saline. Collagen (preferably Puracol Plus) to wound base, cut to size. Fill remaining space with dry gauze. Cover with ABD pads and paper tape or bordered foam.  Change dressing 3 times per week. Home Health for dressing changes 3 times week     Avoid back lying. Change position every 2 hours alternating side to side. Incorporate \"forward sitting\" while in regular chair to offload pressure from wound site. Continue to use air mattress to offload sacrum while in bed. Continue to use offloading cushion/pillow in recliner during dialysis. Increase ambulation/walking at home, decreasing sitting time.

## 2022-10-13 NOTE — WOUND CARE
Discharge Instructions for  Esteban Bain  [address] [facility] [address]  [facility] [address]  Phone [phone]   Fax [phone]      NAME:  Jessica Alonso  YOB: 1950  MEDICAL RECORD NUMBER:  [id number]  DATE:  10/13/2022    Return Appointment:   3 weeks with Dion Glover MD      Instructions: Sacral Wound:  Cleanse wound with normal saline. Collagen (preferably Puracol Plus) to wound base, cut to size. Fill remaining space with dry gauze. Cover with ABD pads and paper tape or bordered foam.  Change dressing 3 times per week. Home Health for dressing changes 3 times week     Avoid back lying. Change position every 2 hours alternating side to side. Incorporate \"forward sitting\" while in regular chair to offload pressure from wound site. Continue to use air mattress to offload sacrum while in bed. Continue to use offloading cushion/pillow in recliner during dialysis. Increase ambulation/walking at home, decreasing sitting time. Should you experience increased redness, swelling, pain, foul odor, size of wound(s), or have a temperature over 101 degrees please contact the [address] [place] [address] Road at [phone] or if after hours contact your primary care physician or go to the hospital emergency department. PLEASE NOTE: IF YOU ARE UNABLE TO OBTAIN WOUND SUPPLIES, CONTINUE TO USE THE SUPPLIES YOU HAVE AVAILABLE UNTIL YOU ARE ABLE TO REACH US. IT IS MOST IMPORTANT TO KEEP THE WOUND COVERED AT ALL TIMES.     Electronically signed Estuardo Hackett PT, [facility] on 10/13/2022 at 10:28 AM

## 2022-11-03 ENCOUNTER — HOSPITAL ENCOUNTER (OUTPATIENT)
Dept: WOUND CARE | Age: 72
Discharge: HOME | End: 2022-11-03
Payer: MEDICARE

## 2022-11-03 VITALS
TEMPERATURE: 96.7 F | RESPIRATION RATE: 18 BRPM | BODY MASS INDEX: 29.02 KG/M2 | DIASTOLIC BLOOD PRESSURE: 71 MMHG | HEART RATE: 60 BPM | HEIGHT: 64 IN | WEIGHT: 170 LBS | SYSTOLIC BLOOD PRESSURE: 169 MMHG

## 2022-11-03 PROCEDURE — 99214 OFFICE O/P EST MOD 30 MIN: CPT | Performed by: SURGERY

## 2022-11-03 PROCEDURE — 99213 OFFICE O/P EST LOW 20 MIN: CPT

## 2022-11-03 NOTE — PROGRESS NOTES
[address]  Consult Note/H&P/Progress Note      [address] [facility] RECORD NUMBER:  [id number]  AGE: 74 y.o. RACE Black /   GENDER: female  : 1950  EPISODE DATE:  11/3/2022       Subjective:      CC (Chief Complaint) and HISTORY of PRESENT ILLNESS (HPI):    Glenny Buck is a 74 y.o. female who presents today for wound/ulcer evaluation. She came in for her first visit with us on 3/3/22. She is seeing us for a large stage IV sacral pressure ulcer   She was having packing change performed with Dakin solution prior to wound center enrollment. She had a history of septic shock related to a dialysis catheter in early 2022. This resulted in hospitalization   She was transferred to skilled nursing facility on 2022. She is brought back to the ER on 2022 with a stage II sacral pressure ulcer which has progressed to stage IV.    She had a wound VAC at one point which could not get a seal.   She had C. difficile infection and has had a fecal management system in place for more than a month by her report                his information was obtained from the patient  The following HPI elements were documented for the patient's wound:  Location: Large  stage IV sacral pressure ulcer  Duration: Since approximately early 2022   Severity: Severe   Context:     Modifying Factors:  Nothing makes better or worse   Quality:     Timing:     Associated Signs and Symptoms : No recent fevers         Ulcer Identification:   Ulcer Type: pressure      Contributing Factors: chronic pressure, decreased mobility, incontinence of stool and obesity          PAST MEDICAL HISTORY  Past Medical History:   Diagnosis Date    Anemia     Arrhythmia     patient states she has a heart murmur,Echo 10/12/2021 EF 55-65% sees [facility]    CAD (coronary artery disease)     cardiac cath, stent x 1    Cancer ([facility] [address] uterine    Chronic kidney disease     2018    Diabetes ([facility] [address]     typell, avgfbs 95, last A1C was 7.0 on 06/27/2021 s/sx of hypo below 60    Dialysis patient [facility])     M,W,F    Heart failure ([facility] [address]     Echo 10/12/2021 on Entresto 55-65%, improved from 6/27/2021 Echo 25%    Hypercholesterolemia     Hypertension     Morbid obesity ([facility] [address]     Nausea & vomiting         PAST SURGICAL HISTORY  Past Surgical History:   Procedure Laterality Date    COLONOSCOPY      DILATION AND CURETTAGE OF UTERUS      HYSTERECTOMY (CERVIX STATUS UNKNOWN)      IR TUNNELED CATHETER PLACEMENT GREATER THAN 5 YEARS  8/23/2021    right chest    IR TUNNELED CATHETER PLACEMENT GREATER THAN 5 YEARS  8/23/2021    IR TUNNELED CATHETER PLACEMENT GREATER THAN 5 YEARS 8/23/2021 [facility] RADIOLOGY SPECIALS    VASCULAR SURGERY Left 05/30/2018    AVF creation    VASCULAR SURGERY Right 12/01/2021    Creation of right upper arm brachial axillary loop graft       FAMILY HISTORY  Family History   Problem Relation Age of Onset    Hypertension Mother     Stroke Mother     Heart Disease Father         MI    Diabetes Brother     Heart Disease Brother     Heart Disease Brother     Heart Disease Sister     Hypertension Father        SOCIAL HISTORY  Social History     Tobacco Use    Smoking status: Never    Smokeless tobacco: Never   Substance Use Topics    Alcohol use: No    Drug use: No       ALLERGIES  No Known Allergies    MEDICATIONS  Current Outpatient Medications on File Prior to Encounter   Medication Sig Dispense Refill    cloNIDine (CATAPRES) 0.2 MG tablet       metoprolol succinate (TOPROL XL) 25 MG extended release tablet Take 25 mg by mouth in the morning.       cinacalcet (SENSIPAR) 30 MG tablet every other day      acetaminophen (TYLENOL) 500 MG tablet 1 tablet EVERY 6 HOURS (route: oral)      amLODIPine (NORVASC) 5 MG tablet Take 5 mg by mouth daily      B Complex-C-Folic Acid (NEPHRO-DINAH) 0.8 MG TABS Take 1 tablet by mouth daily      ondansetron (ZOFRAN-ODT) 4 MG disintegrating tablet ondansetron 4 mg disintegrating tablet   Place 1 tablet 3 times a day by oral route as needed. sevelamer (RENVELA) 800 MG tablet sevelamer carbonate 800 mg tablet   TAKE 1 TABLET BY MOUTH THREE TIMES DAILY WITH MEALS      torsemide (DEMADEX) 100 MG tablet torsemide 100 mg tablet   Take 1 tablet by oral route. aspirin 81 MG EC tablet Take by mouth daily      Ferrous Fumarate 325 (106 Fe) MG TABS Take by mouth      gabapentin (NEURONTIN) 100 MG capsule Take 100 mg by mouth daily. insulin lispro (HUMALOG) 100 UNIT/ML injection vial Inject 100 Units into the skin      insulin NPH (HUMULIN N;NOVOLIN N) 100 UNIT/ML injection vial Inject into the skin      lisinopril (PRINIVIL;ZESTRIL) 40 MG tablet Take 40 mg by mouth daily      traMADol (ULTRAM) 50 MG tablet Take 50 mg by mouth every 6 hours as needed. No current facility-administered medications on file prior to encounter. REVIEW OF SYSTEMS  The patient has no difficulty with chest pain or shortness of breath. No fever or chills. Comprehensive review of systems was otherwise unremarkable except as noted above. Objective:   Physical Exam:   Recent vitals (if inpt):  Patient Vitals for the past 24 hrs:   BP Temp Temp src Pulse Resp Height Weight   11/03/22 0958 (!) 169/71 (!) 96.7 °F (35.9 °C) Temporal 60 18 -- --   11/03/22 0956 -- -- -- -- -- 5' 4\" (1.626 m) 170 lb (77.1 kg)               BP (!) 169/71   Pulse 60   Temp (!) 96.7 °F (35.9 °C) (Temporal)   Resp 18   Ht 5' 4\" (1.626 m)   Wt 170 lb (77.1 kg)   BMI 29.18 kg/m²   Wt Readings from Last 3 Encounters:   11/03/22 170 lb (77.1 kg)   10/13/22 168 lb (76.2 kg)   09/15/22 170 lb (77.1 kg)     Constitutional: Alert, oriented, cooperative patient in no acute distress; appears stated age;  General appearance is within normal limits for wound care patient population. See the today's recorded vitals signs and constitutional data.   Eyes: Pupils equal; Sclera are clear. EOMs intact; The eyes appear to track and move normally. The sclera are not injected. The conjunctive are clear. The eyelids are normal. There is no scleral icterus. ENMT: There are no obvious external ear, nose, lip or mouth lesions. Nares normal; Neck: Overall contour of the neck is normal with no obvious neck masses. Gross hearing is within normal limits. No obvious neck masses  Resp:  Breathing is non-labored; normal rate and effort; no audible wheezing. CV: RRR;  no JVD; No evidence of cyanosis of the upper extremities. The extremities are perfused without embolic sign, splinter hemorrhages, or petechia. GI: soft and non-distended without acute abnormality noted. Musculoskeletal: unremarkable with normal function. No embolic signs or cyanosis. Neuro:  Oriented; moves all 4; no focal deficits  Psychiatric: Judgement and insight are within normal limits for the wound care population of patients. Patient is oriented to person, place, and time. Recent and remote memory are within normal limits. Mood and affect are within normal limits. Integumentary (Skin/Wounds)  See inspection of wound(s) below. There are no other skin areas of palpable concern. See wound center documentation including photos in the [address] Wound Template made part of this record by reference.          Wound Care Documentation:    Wound 03/03/22 Sacrum #1 (Active)   Wound Image   11/03/22 0959   Wound Etiology Pressure Stage 3 11/03/22 0959   Dressing Status Intact 11/03/22 0959   Wound Cleansed Cleansed with saline 11/03/22 0959   Dressing/Treatment Gauze dressing/dressing sponge 11/03/22 0959   Wound Length (cm) 0.1 cm 11/03/22 0959   Wound Width (cm) 0.1 cm 11/03/22 0959   Wound Depth (cm) 0.1 cm 11/03/22 0959   Wound Surface Area (cm^2) 0.01 cm^2 11/03/22 0959   Change in Wound Size % (l*w) 99.88 11/03/22 0959   Wound Volume (cm^3) 0.001 cm^3 11/03/22 0959   Wound Healing % 100 11/03/22 9579   Wound Assessment Epithelialization 11/03/22 0959   Drainage Amount None 11/03/22 0959   Drainage Description Serous 09/15/22 0956   Odor None 11/03/22 0959   Carola-wound Assessment Intact 11/03/22 0959   Margins Epibole (rolled edges) 08/11/22 1008   Wound Thickness Description not for Pressure Injury Full thickness 11/03/22 0959   Number of days: 244                                                      Amount and/or Complexity of Data Reviewed and Analyzed:  I reviewed and analyzed all of the unique labs and radiologic studies that are shown below as well as any that are in the HPI, and any that are in the expanded problem list below  *Each unique test, order, or document contributes to the combination of 2 or combination of 3 in Category 1 below. I also independently reviewed radiology images for studies I described above in the HPI or studies I have ordered. For this visit I also reviewed old records and prior notes. No results for input(s): WBC, HGB, PLT, NA, K, CL, CO2, BUN, CREA, GLU, INR, APTT, ALT, AML, AML, LCAD, PCO2, PO2, HCO3 in the last 72 hours. Invalid input(s): PTP, TBIL, TBILI, CBIL, SGOT, GPT, AP, LPSE, NH4, TROPT, TROIQ,  PH  No results for input(s): INR, APTT, ALT, AML in the last 72 hours.     Invalid input(s): PTP, TBIL, TBILI, CBIL, SGOT, GPT, AP, LPSE    Most recent blood counts (CBC) review  Lab Results   Component Value Date    WBC 9.6 02/03/2022    HGB 6.7 (LL) 02/03/2022    HCT 21.7 (L) 02/03/2022    MCV 90.0 02/03/2022     02/03/2022     Most recent chemistry (BMP) test review   Lab Results   Component Value Date/Time     08/23/2021 11:01 AM    K 4.7 08/23/2021 11:01 AM     08/23/2021 11:01 AM    CO2 27 08/23/2021 11:01 AM    BUN 64 08/23/2021 11:01 AM    CREATININE 10.40 08/23/2021 11:01 AM    GLUCOSE 162 08/23/2021 11:01 AM    CALCIUM 8.3 08/23/2021 11:01 AM      Most recent Liver enzyme test review  No results found for: ALT, AST, GGT, ALKPHOS, BILITOT  Most recent coagulation (coags) review  @lastinr@  No results found for: INR, APTT, AML, AML, LCAD    Diabetic assessment  No results found for: LABA1C  No results found for: EAG    Nutritional assessment screen to assess wound healing ability:  No results found for: LABPROT, LABALBU  No results found for: TP, ALBR, ALB    Xray Result (most recent):  No results found for this or any previous visit from the past 3650 days. CT Result (most recent):  No results found for this or any previous visit from the past 3650 days. Admission date (for inpatients): 11/3/2022   Day of Surgery  * No procedures listed *    Assessment:      Problem List Items Addressed This Visit          Other    BMI 28.0-28.9,adult         [unfilled]    Active Problems:    BMI 28.0-28.9,adult    Stage 4 skin ulcer of sacral region [facility])  Resolved Problems:    * No resolved hospital problems. *      Patient Active Problem List    Diagnosis Date Noted    BMI 28.0-28.9,adult 11/03/2022     Priority: Medium    BMI 29.0-29.9,adult 08/11/2022     Priority: Medium    Stage 4 skin ulcer of sacral region ([facility] [address] 03/03/2022    Other specified anemias 03/03/2022    Fecal incontinence 03/03/2022    C. difficile colitis 03/03/2022    Obesity, morbid ([facility] [address] 05/23/2018           Plan:     Number and Complexity of Problems addressed and   Risks of complications and/or morbidity of management    Treatment Note please see attached Discharge Instructions  Any procedures done today in the wound center (if documented in a separate note) in [facility] are made part of this record by reference  Written patient dismissal instructions given to patient or POA. Large stage IV sacral pressure ulcer  I sharply and selectively debrided this with a #15 scalpel on 3/3/22.   Stop Dakin's solution and initiation of saline moistened gauze Kerlix packing into the sacral wound BID + after each bowel movement    On 6/16/22 we converted to Puracol Plus diagnostic testing or treatment     21554  12790 Mod Moderate  ? 1or more chronic illnesses with exacerbation, progression, or side effects of treatment;    or  ?2or more stable chronic illnesses;    or  ?1undiagnosed new problem with uncertain prognosis;    or  ?1acute illness with systemic symptoms;    or  ?1acute complicated injury   Moderate  (Must meet the requirements of at least 1 out of 3 categories)  Category 1: Tests, documents, or independent historian(s)  ? Any combination of 3 from the following:   ?Review of prior external note(s) from each unique source*;  ?Review of the result(s) of each unique test*;  ?Ordering of each unique test*;  ?Assessment requiring an independent historian(s)    or  Category 2: Independent interpretation of tests   ? Independent interpretation of a test performed by another physician/other qualified health care professional (not separately reported);     or  Category 3: Discussion of management or test interpretation  ? Discussion of management or test interpretation with external physician/other qualified health care professional/appropriate source (not separately reported)   Moderate risk of morbidity from additional diagnostic testing or treatment  Examples only:  ?Prescription drug management - advanced wound care prescription wound care products- purachol plus started 6/16/22  (eg Iodosorb, hydrofera, silvadene, collagen, puracol plus, optiloc, biologics - placenta, Theraskin, Apligraf). Note also that if home health care is involved, they will not apply topical therapies without a prescription/order from physician      ? Decision regarding minor surgery with identified patient or procedure risk factors  ? Decision regarding elective major surgery without identified patient or procedure risk factors   ? Diagnosis or treatment significantly limited by social determinants of health       [id number] High High  ? 1or more chronic illnesses with severe exacerbation, progression, or side effects of treatment;    or  ?1 acute or chronic illness or injury that poses a threat to life or bodily function   Extensive  (Must meet the requirements of at least 2 out of 3 categories)  Category 1: Tests, documents, or independent historian(s)  ? Any combination of 3 from the following:   ?Review of prior external note(s) from each unique source*;  ?Review of the result(s) of each unique test*;   ?Ordering of each unique test*;   ?Assessment requiring an independent historian(s)    or   Category 2: Independent interpretation of tests   ? Independent interpretation of a test performed by another physician/other qualified health care professional (not separately reported);     or  Category 3: Discussion of management or test interpretation  ? Discussion of management or test interpretation with external physician/other qualified health care professional/appropriate source (not separately reported)   High risk of morbidity from additional diagnostic testing or treatment  Examples only:  ?Drug therapy requiring intensive monitoring for toxicity  ? Decision regarding elective major surgery with identified patient or procedure risk factors  ? Decision regarding emergency major surgery  ? Decision regarding hospitalization  ? Decision not to resuscitate or to de-escalate care because of poor prognosis                 I have personally performed a face-to-face diagnostic evaluation and management  service on this patient. I have independently seen the patient. I have independently obtained the above history from the patient/family. I have independently examined the patient with above findings. I have independently reviewed data/labs for this patient and developed the above plan of care (MDM).       Electronically signed by Indigo Nicolas MD on 11/3/2022 at 10:11 AM

## 2022-11-03 NOTE — WOUND CARE
Discharge Instructions for  Esteban Bain  [address] [place] [address], [facility] [address]  Phone [phone]   Fax [phone]      NAME:  Glenny Buck  YOB: 1950  MEDICAL RECORD NUMBER:  [id number]  DATE:  11/3/2022    Return Appointment:   1 month with Dolly Suresh MD      Instructions: Sacral wound:  Clean with saline. Apply thin layer of desitin. Cover with bordered foam  Change 3 times weekly or as needed after bowel movements. Home health for wound assessment and ensure proper technique when patient changes bandage herself. Should you experience increased redness, swelling, pain, foul odor, size of wound(s), or have a temperature over 101 degrees please contact the [address] [place] [address] Road at [phone] or if after hours contact your primary care physician or go to the hospital emergency department. PLEASE NOTE: IF YOU ARE UNABLE TO OBTAIN WOUND SUPPLIES, CONTINUE TO USE THE SUPPLIES YOU HAVE AVAILABLE UNTIL YOU ARE ABLE TO REACH US. IT IS MOST IMPORTANT TO KEEP THE WOUND COVERED AT ALL TIMES.     Electronically signed Fawn Corey RN on 11/3/2022 at 10:15 AM

## 2022-11-03 NOTE — FLOWSHEET NOTE
11/03/22 0959   Wound 03/03/22 Sacrum #1   Date First Assessed/Time First Assessed: 03/03/22 1103   Present on Hospital Admission: Yes  Primary Wound Type: Pressure Injury  Location: Sacrum  Wound Description (Comments): #1   Wound Image    Wound Etiology Pressure Stage 3   Dressing Status Intact   Wound Cleansed Cleansed with saline   Dressing/Treatment Gauze dressing/dressing sponge   Wound Length (cm) 0.1 cm   Wound Width (cm) 0.1 cm   Wound Depth (cm) 0.1 cm   Wound Surface Area (cm^2) 0.01 cm^2   Change in Wound Size % (l*w) 99.88   Wound Volume (cm^3) 0.001 cm^3   Wound Healing % 100   Wound Assessment Epithelialization   Drainage Amount None   Odor None   Carola-wound Assessment Intact   Wound Thickness Description not for Pressure Injury Full thickness

## 2022-12-01 ENCOUNTER — HOSPITAL ENCOUNTER (OUTPATIENT)
Dept: WOUND CARE | Age: 72
Discharge: HOME | End: 2022-12-01
Payer: MEDICARE

## 2022-12-01 VITALS
BODY MASS INDEX: 29.02 KG/M2 | HEART RATE: 58 BPM | HEIGHT: 64 IN | DIASTOLIC BLOOD PRESSURE: 66 MMHG | WEIGHT: 170 LBS | RESPIRATION RATE: 18 BRPM | TEMPERATURE: 96.5 F | SYSTOLIC BLOOD PRESSURE: 152 MMHG

## 2022-12-01 PROCEDURE — 99214 OFFICE O/P EST MOD 30 MIN: CPT | Performed by: SURGERY

## 2022-12-01 PROCEDURE — 99213 OFFICE O/P EST LOW 20 MIN: CPT

## 2022-12-01 NOTE — WOUND CARE
Discharge Instructions for  Esteban Bain  [address] [place] [address]  [address], [address]   Phone [phone]   Fax [phone]      NAME:  Glenny Buck  YOB: 1950  MEDICAL RECORD NUMBER:  [id number]  DATE:  12/1/2022    Return Appointment:   Discharge with Dolly Suresh MD      Instructions: Wound is healed at this time. Keep sacral area/scar covered with duoderm patch, changing 1-2 times weekly. Home health, please instruct family member on how to apply duoderm and then may discharge. Continue to offload pressure to sacrum. Should you experience increased redness, swelling, pain, foul odor, size of wound(s), or have a temperature over 101 degrees please contact the [address] [place] [address] Road at [phone] or if after hours contact your primary care physician or go to the hospital emergency department. PLEASE NOTE: IF YOU ARE UNABLE TO OBTAIN WOUND SUPPLIES, CONTINUE TO USE THE SUPPLIES YOU HAVE AVAILABLE UNTIL YOU ARE ABLE TO REACH US. IT IS MOST IMPORTANT TO KEEP THE WOUND COVERED AT ALL TIMES.     Electronically signed Fawn Corey RN on 12/1/2022 at 10:35 AM

## 2022-12-01 NOTE — FLOWSHEET NOTE
12/01/22 1002   Wound 03/03/22 Sacrum #1   Date First Assessed/Time First Assessed: 03/03/22 1103   Present on Hospital Admission: Yes  Primary Wound Type: Pressure Injury  Location: Sacrum  Wound Description (Comments): #1   Wound Image    Wound Etiology Pressure Stage 3   Dressing Status Intact   Wound Cleansed Cleansed with saline   Dressing/Treatment Collagen   Wound Length (cm) 0 cm   Wound Width (cm) 0 cm   Wound Depth (cm) 0 cm   Wound Surface Area (cm^2) 0 cm^2   Change in Wound Size % (l*w) 100   Wound Volume (cm^3) 0 cm^3   Wound Healing % 100   Wound Assessment Epithelialization   Drainage Amount None   Odor None   Carola-wound Assessment Intact

## 2022-12-01 NOTE — PROGRESS NOTES
[address]  Consult Note/H&P/Progress Note      [facility] RECORD NUMBER:  [id number]  AGE: 74 y.o. RACE Black /   GENDER: female  : 1950  EPISODE DATE:  2022       Subjective:      CC (Chief Complaint) and HISTORY of PRESENT ILLNESS (HPI):    Ana Billings is a 74 y.o. female who presents today for wound/ulcer evaluation. She came in for her first visit with us on 3/3/22. She is seeing us for a large stage IV sacral pressure ulcer   She was having packing change performed with Dakin solution prior to wound center enrollment. She had a history of septic shock related to a dialysis catheter in early 2022. This resulted in hospitalization   She was transferred to skilled nursing facility on 2022. She is brought back to the ER on 2022 with a stage II sacral pressure ulcer which has progressed to stage IV.    She had a wound VAC at one point which could not get a seal.   She had C. difficile infection and has had a fecal management system in place for more than a month by her report                his information was obtained from the patient  The following HPI elements were documented for the patient's wound:  Location: Large  stage IV sacral pressure ulcer  Duration: Since approximately early 2022   Severity: Severe   Context:     Modifying Factors:  Nothing makes better or worse   Quality:     Timing:     Associated Signs and Symptoms : No recent fevers         Ulcer Identification:   Ulcer Type: pressure      Contributing Factors: chronic pressure, decreased mobility, incontinence of stool and obesity          PAST MEDICAL HISTORY  Past Medical History:   Diagnosis Date    Anemia     Arrhythmia     patient states she has a heart murmur,Echo 10/12/2021 EF 55-65% sees [facility] Card    CAD (coronary artery disease)     cardiac cath, stent x 1    Cancer ([facility] [address] uterine    Chronic kidney disease     2018    Diabetes ([facility] [address]     typell, avgfbs 95, last A1C was 7.0 on 06/27/2021 s/sx of hypo below 60    Dialysis patient [facility])     M,W,F    Heart failure ([facility] [address]     Echo 10/12/2021 on Entresto 55-65%, improved from 6/27/2021 Echo 25%    Hypercholesterolemia     Hypertension     Morbid obesity ([facility] [address]     Nausea & vomiting         PAST SURGICAL HISTORY  Past Surgical History:   Procedure Laterality Date    COLONOSCOPY      DILATION AND CURETTAGE OF UTERUS      HYSTERECTOMY (CERVIX STATUS UNKNOWN)      IR TUNNELED CATHETER PLACEMENT GREATER THAN 5 YEARS  8/23/2021    right chest    IR TUNNELED CATHETER PLACEMENT GREATER THAN 5 YEARS  8/23/2021    IR TUNNELED CATHETER PLACEMENT GREATER THAN 5 YEARS 8/23/2021 [facility] SPECIALS    VASCULAR SURGERY Left 05/30/2018    AVF creation    VASCULAR SURGERY Right 12/01/2021    Creation of right upper arm brachial axillary loop graft       FAMILY HISTORY  Family History   Problem Relation Age of Onset    Hypertension Mother     Stroke Mother     Heart Disease Father         MI    Diabetes Brother     Heart Disease Brother     Heart Disease Brother     Heart Disease Sister     Hypertension Father        SOCIAL HISTORY  Social History     Tobacco Use    Smoking status: Never    Smokeless tobacco: Never   Substance Use Topics    Alcohol use: No    Drug use: No       ALLERGIES  No Known Allergies    MEDICATIONS  Current Outpatient Medications on File Prior to Encounter   Medication Sig Dispense Refill    cloNIDine (CATAPRES) 0.2 MG tablet       metoprolol succinate (TOPROL XL) 25 MG extended release tablet Take 25 mg by mouth in the morning.       cinacalcet (SENSIPAR) 30 MG tablet every other day      acetaminophen (TYLENOL) 500 MG tablet 1 tablet EVERY 6 HOURS (route: oral)      amLODIPine (NORVASC) 5 MG tablet Take 5 mg by mouth daily      B Complex-C-Folic Acid (NEPHRO-DINAH) 0.8 MG TABS Take 1 tablet by mouth daily      ondansetron (ZOFRAN-ODT) 4 MG disintegrating tablet ondansetron 4 mg disintegrating tablet   Place 1 tablet 3 times a day by oral route as needed. sevelamer (RENVELA) 800 MG tablet sevelamer carbonate 800 mg tablet   TAKE 1 TABLET BY MOUTH THREE TIMES DAILY WITH MEALS      torsemide (DEMADEX) 100 MG tablet torsemide 100 mg tablet   Take 1 tablet by oral route. aspirin 81 MG EC tablet Take by mouth daily      Ferrous Fumarate 325 (106 Fe) MG TABS Take by mouth      gabapentin (NEURONTIN) 100 MG capsule Take 100 mg by mouth daily. insulin lispro (HUMALOG) 100 UNIT/ML injection vial Inject 100 Units into the skin      insulin NPH (HUMULIN N;NOVOLIN N) 100 UNIT/ML injection vial Inject into the skin      lisinopril (PRINIVIL;ZESTRIL) 40 MG tablet Take 40 mg by mouth daily      traMADol (ULTRAM) 50 MG tablet Take 50 mg by mouth every 6 hours as needed. No current facility-administered medications on file prior to encounter. REVIEW OF SYSTEMS  The patient has no difficulty with chest pain or shortness of breath. No fever or chills. Comprehensive review of systems was otherwise unremarkable except as noted above. Objective:   Physical Exam:   Recent vitals (if inpt):  Patient Vitals for the past 24 hrs:   BP Temp Temp src Pulse Resp Height Weight   12/01/22 1002 (!) 152/66 (!) 96.5 °F (35.8 °C) Temporal 58 18 -- --   12/01/22 0953 -- -- -- -- -- 5' 4\" (1.626 m) 170 lb (77.1 kg)               BP (!) 152/66   Pulse 58   Temp (!) 96.5 °F (35.8 °C) (Temporal)   Resp 18   Ht 5' 4\" (1.626 m)   Wt 170 lb (77.1 kg)   BMI 29.18 kg/m²   Wt Readings from Last 3 Encounters:   12/01/22 170 lb (77.1 kg)   11/03/22 170 lb (77.1 kg)   10/13/22 168 lb (76.2 kg)     Constitutional: Alert, oriented, cooperative patient in no acute distress; appears stated age;  General appearance is within normal limits for wound care patient population. See the today's recorded vitals signs and constitutional data.   Eyes: Pupils equal; Sclera are clear. EOMs intact; The eyes appear to track and move normally. The sclera are not injected. The conjunctive are clear. The eyelids are normal. There is no scleral icterus. ENMT: There are no obvious external ear, nose, lip or mouth lesions. Nares normal; Neck: Overall contour of the neck is normal with no obvious neck masses. Gross hearing is within normal limits. No obvious neck masses  Resp:  Breathing is non-labored; normal rate and effort; no audible wheezing. CV: RRR;  no JVD; No evidence of cyanosis of the upper extremities. The extremities are perfused without embolic sign, splinter hemorrhages, or petechia. GI: soft and non-distended without acute abnormality noted. Musculoskeletal: unremarkable with normal function. No embolic signs or cyanosis. Neuro:  Oriented; moves all 4; no focal deficits  Psychiatric: Judgement and insight are within normal limits for the wound care population of patients. Patient is oriented to person, place, and time. Recent and remote memory are within normal limits. Mood and affect are within normal limits. Integumentary (Skin/Wounds)  See inspection of wound(s) below. There are no other skin areas of palpable concern. See wound center documentation including photos in the [address] Wound Template made part of this record by reference.          Wound Care Documentation:    Wound 03/03/22 Sacrum #1 (Active)   Wound Image   12/01/22 1002   Wound Etiology Pressure Stage 3 12/01/22 1002   Dressing Status Intact 12/01/22 1002   Wound Cleansed Cleansed with saline 12/01/22 1002   Dressing/Treatment Collagen 12/01/22 1002   Wound Length (cm) 0 cm 12/01/22 1002   Wound Width (cm) 0 cm 12/01/22 1002   Wound Depth (cm) 0 cm 12/01/22 1002   Wound Surface Area (cm^2) 0 cm^2 12/01/22 1002   Change in Wound Size % (l*w) 100 12/01/22 1002   Wound Volume (cm^3) 0 cm^3 12/01/22 1002   Wound Healing % 100 12/01/22 1002   Wound Assessment Epithelialization 12/01/22 1002   Drainage Amount None 12/01/22 1002   Drainage Description Serous 09/15/22 0956   Odor None 12/01/22 1002   Carola-wound Assessment Intact 12/01/22 1002   Margins Epibole (rolled edges) 08/11/22 1008   Wound Thickness Description not for Pressure Injury Full thickness 11/03/22 0959   Number of days: 272                                                        Amount and/or Complexity of Data Reviewed and Analyzed:  I reviewed and analyzed all of the unique labs and radiologic studies that are shown below as well as any that are in the HPI, and any that are in the expanded problem list below  *Each unique test, order, or document contributes to the combination of 2 or combination of 3 in Category 1 below. I also independently reviewed radiology images for studies I described above in the HPI or studies I have ordered. For this visit I also reviewed old records and prior notes. No results for input(s): WBC, HGB, PLT, NA, K, CL, CO2, BUN, CREA, GLU, INR, APTT, ALT, AML, AML, LCAD, PCO2, PO2, HCO3 in the last 72 hours. Invalid input(s): PTP, TBIL, TBILI, CBIL, SGOT, GPT, AP, LPSE, NH4, TROPT, TROIQ,  PH  No results for input(s): INR, APTT, ALT, AML in the last 72 hours.     Invalid input(s): PTP, TBIL, TBILI, CBIL, SGOT, GPT, AP, LPSE    Most recent blood counts (CBC) review  Lab Results   Component Value Date    WBC 9.6 02/03/2022    HGB 6.7 (LL) 02/03/2022    HCT 21.7 (L) 02/03/2022    MCV 90.0 02/03/2022     02/03/2022     Most recent chemistry (BMP) test review   Lab Results   Component Value Date/Time     08/23/2021 11:01 AM    K 4.7 08/23/2021 11:01 AM     08/23/2021 11:01 AM    CO2 27 08/23/2021 11:01 AM    BUN 64 08/23/2021 11:01 AM    CREATININE 10.40 08/23/2021 11:01 AM    GLUCOSE 162 08/23/2021 11:01 AM    CALCIUM 8.3 08/23/2021 11:01 AM      Most recent Liver enzyme test review  No results found for: ALT, AST, GGT, ALKPHOS, BILITOT  Most recent coagulation (coags) review  @lastinr@  No results found for: INR, APTT, AML, AML, LCAD    Diabetic assessment  No results found for: LABA1C  No results found for: EAG    Nutritional assessment screen to assess wound healing ability:  No results found for: LABPROT, LABALBU  No results found for: TP, ALBR, ALB    Xray Result (most recent):  No results found for this or any previous visit from the past 3650 days. CT Result (most recent):  No results found for this or any previous visit from the past 3650 days. Admission date (for inpatients): 12/1/2022   Day of Surgery  * No procedures listed *    Assessment:      Problem List Items Addressed This Visit    None        [unfilled]    Active Problems:    BMI 29.0-29.9,adult    Stage 4 skin ulcer of sacral region ([address]    Obesity, morbid ([address]  Resolved Problems:    * No resolved hospital problems. *      Patient Active Problem List    Diagnosis Date Noted    BMI 28.0-28.9,adult 11/03/2022     Priority: Medium    BMI 29.0-29.9,adult 08/11/2022     Priority: Medium    Stage 4 skin ulcer of sacral region ([address] 03/03/2022    Other specified anemias 03/03/2022    Fecal incontinence 03/03/2022    C. difficile colitis 03/03/2022    Obesity, morbid ([address] 05/23/2018           Plan:     Number and Complexity of Problems addressed and   Risks of complications and/or morbidity of management    Treatment Note please see attached Discharge Instructions  Any procedures done today in the wound center (if documented in a separate note) in [facility] are made part of this record by reference  Written patient dismissal instructions given to patient or POA. Large stage IV sacral pressure ulcer  I sharply and selectively debrided this with a #15 scalpel on 3/3/22.     On 6/16/22 we converted to Puracol Plus dressing changes  On 11/3/2022 we converted to a small application of Desitin under Allevyn pads 3 times a week and as needed with bowel movements and soilage  By 12/1/2022 her sacral wound had completely healed. I recommended DuoDERM every 7 to 10 days. We discussed application of this but she will need help from family members she will need to be taught by home health nursing. We are requesting home health nursing to teach family how to replace the DuoDERM    High-protein supplements  Recommend level 2 low air loss mattress for as long as she can qualify for its use. Jose Campa Recommend frequent turning side to side. See me as needed from here. Anemia    Hgb 6.7 on 2/3/22   Follow-up LMD         BMI >29---->>28    Encourage weight loss                Wound Care  No orders of the defined types were placed in this encounter. [unfilled]            Level of MDM (2/3 elements below)  Number and Complexity of Problems Addressed Amount and/or Complexity of Data to be Reviewed and Analyzed  *Each unique test, order, or document contributes to the combination of 2 or combination of 3 in Category 1 below. Risk of Complications and/or Morbidity or Mortality of pt Management     72193  84831 SF Minimal  ?1self-limited or minor problem Minimal or none Minimal risk of morbidity from additional diagnostic testing or Rx   44276  64164 Low Low  ? 2or more self-limited or minor problems;    or  ? 1stable chronic illness;    or  ?1acute, uncomplicated illness or injury   Limited  (Must meet the requirements of at least 1 of the 2 categories)  Category 1: Tests and documents   ? Any combination of 2 from the following:  ?Review of prior external note(s) from each unique source*;  ?review of the result(s) of each unique test*;   ?ordering of each unique test*    or   Category 2: Assessment requiring an independent historian(s)  (For the categories of independent interpretation of tests and discussion of management or test interpretation, see moderate or high) Low risk of morbidity from additional diagnostic testing or treatment     31544  55112 Mod Moderate  ? 1or more chronic illnesses with exacerbation, progression, or side effects of treatment;    or  ?2or more stable chronic illnesses;    or  ?1undiagnosed new problem with uncertain prognosis;    or  ?1acute illness with systemic symptoms;    or  ?1acute complicated injury   Moderate  (Must meet the requirements of at least 1 out of 3 categories)  Category 1: Tests, documents, or independent historian(s)  ? Any combination of 3 from the following:   ?Review of prior external note(s) from each unique source*;  ?Review of the result(s) of each unique test*;  ?Ordering of each unique test*;  ?Assessment requiring an independent historian(s)    or  Category 2: Independent interpretation of tests   ? Independent interpretation of a test performed by another physician/other qualified health care professional (not separately reported);     or  Category 3: Discussion of management or test interpretation  ? Discussion of management or test interpretation with external physician/other qualified health care professional/appropriate source (not separately reported)   Moderate risk of morbidity from additional diagnostic testing or treatment  Examples only:  ?Prescription drug management - advanced wound care prescription wound care products- purachol plus started 6/16/22  (eg Iodosorb, hydrofera, silvadene, collagen, puracol plus, optiloc, biologics - placenta, Theraskin, Apligraf). Note also that if home health care is involved, they will not apply topical therapies without a prescription/order from physician      ? Decision regarding minor surgery with identified patient or procedure risk factors  ? Decision regarding elective major surgery without identified patient or procedure risk factors   ? Diagnosis or treatment significantly limited by social determinants of health       55199  62534 High High  ? 1or more chronic illnesses with severe exacerbation, progression, or side effects of treatment;    or  ?1 acute or chronic illness or injury that poses a threat to life or bodily function   Extensive  (Must meet the requirements of at least 2 out of 3 categories)  Category 1: Tests, documents, or independent historian(s)  ? Any combination of 3 from the following:   ?Review of prior external note(s) from each unique source*;  ?Review of the result(s) of each unique test*;   ?Ordering of each unique test*;   ?Assessment requiring an independent historian(s)    or   Category 2: Independent interpretation of tests   ? Independent interpretation of a test performed by another physician/other qualified health care professional (not separately reported);     or  Category 3: Discussion of management or test interpretation  ? Discussion of management or test interpretation with external physician/other qualified health care professional/appropriate source (not separately reported)   High risk of morbidity from additional diagnostic testing or treatment  Examples only:  ?Drug therapy requiring intensive monitoring for toxicity  ? Decision regarding elective major surgery with identified patient or procedure risk factors  ? Decision regarding emergency major surgery  ? Decision regarding hospitalization  ? Decision not to resuscitate or to de-escalate care because of poor prognosis                 I have personally performed a face-to-face diagnostic evaluation and management  service on this patient. I have independently seen the patient. I have independently obtained the above history from the patient/family. I have independently examined the patient with above findings. I have independently reviewed data/labs for this patient and developed the above plan of care (MDM).       Electronically signed by Jose Giles MD on 12/1/2022 at 10:29 AM

## 2024-01-23 NOTE — PROGRESS NOTES
Father         MI    Diabetes Brother     Heart Disease Brother     Heart Disease Brother     Heart Disease Sister     Hypertension Father      Social History     Tobacco Use    Smoking status: Never     Passive exposure: Never    Smokeless tobacco: Never   Substance Use Topics    Alcohol use: No       ROS:    Review of Systems   Cardiovascular:  Negative for chest pain.   Respiratory:  Negative for shortness of breath.           PHYSICAL EXAM:   BP (!) 138/52   Pulse 51   Ht 1.626 m (5' 4\")   Wt 72.1 kg (159 lb)   BMI 27.29 kg/m²      Physical Exam  Constitutional:       Appearance: Normal appearance.   HENT:      Head: Normocephalic and atraumatic.   Eyes:      Conjunctiva/sclera: Conjunctivae normal.   Neck:      Vascular: No carotid bruit.   Cardiovascular:      Rate and Rhythm: Normal rate and regular rhythm.      Heart sounds: Murmur heard.      Holosystolic murmur is present with a grade of 2/6 at the upper left sternal border and lower left sternal border.      No friction rub. No gallop.   Pulmonary:      Effort: No respiratory distress.      Breath sounds: No wheezing or rales.   Musculoskeletal:         General: No swelling.      Cervical back: Neck supple.   Skin:     General: Skin is warm and dry.   Neurological:      General: No focal deficit present.      Mental Status: She is alert.   Psychiatric:         Mood and Affect: Mood normal.         Behavior: Behavior normal.         Medical problems and test results were reviewed with the patient today.     DATA REVIEW    BMP  Lab Results   Component Value Date/Time     08/23/2021 11:01 AM    K 4.7 08/23/2021 11:01 AM     08/23/2021 11:01 AM    CO2 27 08/23/2021 11:01 AM    BUN 64 08/23/2021 11:01 AM    CREATININE 10.40 08/23/2021 11:01 AM    GLUCOSE 162 08/23/2021 11:01 AM    CALCIUM 8.3 08/23/2021 11:01 AM        EKG    Sinus bradycardia with type 2 2nd degree AV block and junctional escape  ventricular rate 51  normal

## 2024-01-24 ENCOUNTER — OFFICE VISIT (OUTPATIENT)
Age: 74
End: 2024-01-24

## 2024-01-24 VITALS
BODY MASS INDEX: 27.14 KG/M2 | SYSTOLIC BLOOD PRESSURE: 138 MMHG | WEIGHT: 159 LBS | DIASTOLIC BLOOD PRESSURE: 52 MMHG | HEART RATE: 51 BPM | HEIGHT: 64 IN

## 2024-01-24 DIAGNOSIS — I50.32 CHRONIC HEART FAILURE WITH PRESERVED EJECTION FRACTION (HFPEF) (HCC): ICD-10-CM

## 2024-01-24 DIAGNOSIS — E78.5 DYSLIPIDEMIA: ICD-10-CM

## 2024-01-24 DIAGNOSIS — R79.89 ABNORMAL LFTS: ICD-10-CM

## 2024-01-24 DIAGNOSIS — I44.1 MOBITZ TYPE 2 SECOND DEGREE AV BLOCK: ICD-10-CM

## 2024-01-24 DIAGNOSIS — Z00.00 ENCOUNTER FOR MEDICAL EXAMINATION TO ESTABLISH CARE: Primary | ICD-10-CM

## 2024-01-24 RX ORDER — CLONIDINE HYDROCHLORIDE 0.2 MG/1
0.2 TABLET ORAL 2 TIMES DAILY
COMMUNITY
Start: 2024-01-24

## 2024-01-24 RX ORDER — HYDRALAZINE HYDROCHLORIDE 25 MG/1
25 TABLET, FILM COATED ORAL 3 TIMES DAILY
Qty: 270 TABLET | Refills: 3 | Status: SHIPPED | OUTPATIENT
Start: 2024-01-24

## 2024-01-24 RX ORDER — HYDRALAZINE HYDROCHLORIDE 25 MG/1
25 TABLET, FILM COATED ORAL 3 TIMES DAILY
Qty: 270 TABLET | Refills: 3 | Status: SHIPPED | OUTPATIENT
Start: 2024-01-24 | End: 2024-01-24 | Stop reason: SDUPTHER

## 2024-01-24 ASSESSMENT — ENCOUNTER SYMPTOMS: SHORTNESS OF BREATH: 0

## 2024-02-20 NOTE — PROGRESS NOTES
[facility] CARDIOLOGY  [address] [place] [address], [address]  [place] [address]  PHONE: [phone]      24    NAME:  Klaudia Ross  : 1950  MRN: [id number]         SUBJECTIVE:   Klaudia Ross is a 73 y.o. female seen for a follow up visit regarding the following:     Chief Complaint   Patient presents with    Results            HPI:  Follow up  Results   .    Follow up asymptomatic Mobitz II block likely due to clonidine and metoprolol. Reduced clonidine with plans to wean off while up titrating hydralazine in its stead, left BB on board with prior HF recovered EF.  Her echo looks well. She never returned to get the monitor.  She also, rather than taking the clonidine from TID to BID, dropped it all the way to QD.  She does not monitor her BP at home but at her dialysis has been as low as 116/85 after dialysis, when she started dialysis it was 161/65.  No dizziness, chest pain, shortness of breath.          She has a history of CAD status post prior PCI, systolic heart failure with EF recovered, hypertension, hyperlipidemia, diabetes, ESRD complicated by MSSA bacteremia of RUE graft s/p excision, stage IV sacral decubitus ulcer c/b infection with intergluteal cleft abscess s/p debridement     Presents today with irregular heart beat, EKG showing atrial fib              PAST CARDIAC HISTORY:  Aug 2018       [facility] stent to [place] (Dr Wong-Janice Da Silva), preserved EF (prior hfref, recovered)  2024       EF 60-65%, abn DF, normal thickness. AV sclerosis, mild MR, mild LAE                     Key CAD CHF Meds            apixaban (ELIQUIS) 5 MG TABS tablet (Taking)    Sig - Route: Take 1 tablet by mouth 2 times daily - Oral    cloNIDine (CATAPRES) 0.2 MG tablet (Taking)    Class: Historical Med    hydrALAZINE (APRESOLINE) 25 MG tablet (Taking)    Sig - Route: Take 1 tablet by mouth 3 times daily - Oral    metoprolol succinate (TOPROL XL) 25 MG extended release tablet (Taking)    Class:

## 2024-02-21 ENCOUNTER — OFFICE VISIT (OUTPATIENT)
Age: 74
End: 2024-02-21
Payer: MEDICARE

## 2024-02-21 VITALS
HEART RATE: 75 BPM | DIASTOLIC BLOOD PRESSURE: 52 MMHG | SYSTOLIC BLOOD PRESSURE: 124 MMHG | BODY MASS INDEX: 28.17 KG/M2 | HEIGHT: 64 IN | WEIGHT: 165 LBS

## 2024-02-21 DIAGNOSIS — I44.1 AV BLOCK, MOBITZ 2: Primary | ICD-10-CM

## 2024-02-21 DIAGNOSIS — N18.6 ESRD (END STAGE RENAL DISEASE) (HCC): ICD-10-CM

## 2024-02-21 DIAGNOSIS — I10 ESSENTIAL HYPERTENSION: ICD-10-CM

## 2024-02-21 DIAGNOSIS — I48.0 PAROXYSMAL ATRIAL FIBRILLATION (HCC): ICD-10-CM

## 2024-02-21 PROCEDURE — 99214 OFFICE O/P EST MOD 30 MIN: CPT | Performed by: INTERNAL MEDICINE

## 2024-02-21 PROCEDURE — 1036F TOBACCO NON-USER: CPT | Performed by: INTERNAL MEDICINE

## 2024-02-21 PROCEDURE — G8484 FLU IMMUNIZE NO ADMIN: HCPCS | Performed by: INTERNAL MEDICINE

## 2024-02-21 PROCEDURE — 1123F ACP DISCUSS/DSCN MKR DOCD: CPT | Performed by: INTERNAL MEDICINE

## 2024-02-21 PROCEDURE — 3078F DIAST BP <80 MM HG: CPT | Performed by: INTERNAL MEDICINE

## 2024-02-21 PROCEDURE — 93000 ELECTROCARDIOGRAM COMPLETE: CPT | Performed by: INTERNAL MEDICINE

## 2024-02-21 PROCEDURE — 3074F SYST BP LT 130 MM HG: CPT | Performed by: INTERNAL MEDICINE

## 2024-02-21 PROCEDURE — G8427 DOCREV CUR MEDS BY ELIG CLIN: HCPCS | Performed by: INTERNAL MEDICINE

## 2024-02-21 PROCEDURE — 1090F PRES/ABSN URINE INCON ASSESS: CPT | Performed by: INTERNAL MEDICINE

## 2024-02-21 PROCEDURE — 3017F COLORECTAL CA SCREEN DOC REV: CPT | Performed by: INTERNAL MEDICINE

## 2024-02-21 PROCEDURE — G8400 PT W/DXA NO RESULTS DOC: HCPCS | Performed by: INTERNAL MEDICINE

## 2024-02-21 PROCEDURE — G8419 CALC BMI OUT NRM PARAM NOF/U: HCPCS | Performed by: INTERNAL MEDICINE

## 2024-02-21 ASSESSMENT — ENCOUNTER SYMPTOMS: SHORTNESS OF BREATH: 0

## 2024-03-14 PROBLEM — N18.6 ESRD (END STAGE RENAL DISEASE) (HCC): Status: ACTIVE | Noted: 2024-03-14

## 2024-03-14 PROBLEM — I48.3 TYPICAL ATRIAL FLUTTER (HCC): Status: ACTIVE | Noted: 2024-03-14

## 2024-03-14 PROBLEM — E66.01 OBESITY, MORBID (HCC): Status: RESOLVED | Noted: 2018-05-23 | Resolved: 2024-03-14

## 2024-03-14 NOTE — PROGRESS NOTES
route as needed. 4/28/22  Yes Provider, Historical, MD   sevelamer (RENVELA) 800 MG tablet  5/16/22  Yes Provider, Madeline, MD   torsemide (DEMADEX) 100 MG tablet    Yes Provider, Historical, MD   Ferrous Fumarate 325 (106 Fe) MG TABS Take by mouth   Yes Automatic Reconciliation, Ar   gabapentin (NEURONTIN) 100 MG capsule Take 1 capsule by mouth daily.   Yes Automatic Reconciliation, Ar   insulin lispro (HUMALOG) 100 UNIT/ML injection vial Inject 100 Units into the skin   Yes Automatic Reconciliation, Ar   insulin NPH (HUMULIN N;NOVOLIN N) 100 UNIT/ML injection vial Inject into the skin   Yes Automatic Reconciliation, Ar   lisinopril (PRINIVIL;ZESTRIL) 40 MG tablet Take 1 tablet by mouth daily   Yes Automatic Reconciliation, Ar   traMADol (ULTRAM) 50 MG tablet Take 1 tablet by mouth every 6 hours as needed.   Yes Automatic Reconciliation, Ar     No Known Allergies  Past Medical History:   Diagnosis Date    Anemia     Arrhythmia     patient states she has a heart murmur,Echo 10/12/2021 EF 55-65% sees [employer] [facility]    CAD (coronary artery disease) 8/20218    cardiac cath, stent x 1    Cancer ([facility])     uterine    Chronic kidney disease     2018    Diabetes ([facility])     typell, avgfbs 95, last A1C was 7.0 on 06/27/2021 s/sx of hypo below 60    Dialysis patient ([facility])     M,W,F    Heart failure ([facility])     Echo 10/12/2021 on Entresto 55-65%, improved from 6/27/2021 Echo 25%    Hypercholesterolemia     Hypertension     Morbid obesity ([facility])     Nausea & vomiting      Past Surgical History:   Procedure Laterality Date    COLONOSCOPY      DILATION AND CURETTAGE OF UTERUS      HYSTERECTOMY (CERVIX STATUS UNKNOWN)      IR TUNNELED CATHETER PLACEMENT GREATER THAN 5 YEARS  8/23/2021    right chest    IR TUNNELED CATHETER PLACEMENT GREATER THAN 5 YEARS  8/23/2021    IR TUNNELED CATHETER PLACEMENT GREATER THAN 5 YEARS 8/23/2021 [facility] RADIOLOG[facility] SPECIALS    VASCULAR SURGERY Left 05/30/2018    AVF creation    VASCULAR SURGERY Right

## 2024-03-15 ENCOUNTER — OFFICE VISIT (OUTPATIENT)
Age: 74
End: 2024-03-15
Payer: MEDICARE

## 2024-03-15 VITALS
DIASTOLIC BLOOD PRESSURE: 68 MMHG | HEIGHT: 64 IN | WEIGHT: 166.6 LBS | HEART RATE: 64 BPM | SYSTOLIC BLOOD PRESSURE: 118 MMHG | BODY MASS INDEX: 28.44 KG/M2

## 2024-03-15 DIAGNOSIS — N18.6 ESRD (END STAGE RENAL DISEASE) (HCC): ICD-10-CM

## 2024-03-15 DIAGNOSIS — I48.3 TYPICAL ATRIAL FLUTTER (HCC): Primary | ICD-10-CM

## 2024-03-15 PROCEDURE — 1036F TOBACCO NON-USER: CPT | Performed by: INTERNAL MEDICINE

## 2024-03-15 PROCEDURE — 1123F ACP DISCUSS/DSCN MKR DOCD: CPT | Performed by: INTERNAL MEDICINE

## 2024-03-15 PROCEDURE — G8419 CALC BMI OUT NRM PARAM NOF/U: HCPCS | Performed by: INTERNAL MEDICINE

## 2024-03-15 PROCEDURE — G8484 FLU IMMUNIZE NO ADMIN: HCPCS | Performed by: INTERNAL MEDICINE

## 2024-03-15 PROCEDURE — G8400 PT W/DXA NO RESULTS DOC: HCPCS | Performed by: INTERNAL MEDICINE

## 2024-03-15 PROCEDURE — 99214 OFFICE O/P EST MOD 30 MIN: CPT | Performed by: INTERNAL MEDICINE

## 2024-03-15 PROCEDURE — 1090F PRES/ABSN URINE INCON ASSESS: CPT | Performed by: INTERNAL MEDICINE

## 2024-03-15 PROCEDURE — G8427 DOCREV CUR MEDS BY ELIG CLIN: HCPCS | Performed by: INTERNAL MEDICINE

## 2024-03-15 PROCEDURE — 3017F COLORECTAL CA SCREEN DOC REV: CPT | Performed by: INTERNAL MEDICINE

## 2024-03-15 RX ORDER — ATORVASTATIN CALCIUM 10 MG/1
10 TABLET, FILM COATED ORAL DAILY
COMMUNITY
Start: 2023-09-13

## 2024-05-14 ENCOUNTER — OFFICE VISIT (OUTPATIENT)
Dept: INTERNAL MEDICINE CLINIC | Facility: CLINIC | Age: 74
End: 2024-05-14
Payer: MEDICARE

## 2024-05-14 VITALS
BODY MASS INDEX: 28.65 KG/M2 | WEIGHT: 167.8 LBS | SYSTOLIC BLOOD PRESSURE: 130 MMHG | HEART RATE: 53 BPM | DIASTOLIC BLOOD PRESSURE: 70 MMHG | HEIGHT: 64 IN

## 2024-05-14 DIAGNOSIS — I48.3 TYPICAL ATRIAL FLUTTER (HCC): ICD-10-CM

## 2024-05-14 DIAGNOSIS — Z79.899 POLYPHARMACY: ICD-10-CM

## 2024-05-14 DIAGNOSIS — N18.6 ESRD ON HEMODIALYSIS (HCC): ICD-10-CM

## 2024-05-14 DIAGNOSIS — Z79.4 TYPE 2 DIABETES MELLITUS WITH CHRONIC KIDNEY DISEASE ON CHRONIC DIALYSIS, WITH LONG-TERM CURRENT USE OF INSULIN (HCC): ICD-10-CM

## 2024-05-14 DIAGNOSIS — I48.92 ATRIAL FIBRILLATION/FLUTTER (HCC): ICD-10-CM

## 2024-05-14 DIAGNOSIS — N18.6 TYPE 2 DIABETES MELLITUS WITH CHRONIC KIDNEY DISEASE ON CHRONIC DIALYSIS, WITH LONG-TERM CURRENT USE OF INSULIN (HCC): ICD-10-CM

## 2024-05-14 DIAGNOSIS — E11.22 TYPE 2 DIABETES MELLITUS WITH CHRONIC KIDNEY DISEASE ON CHRONIC DIALYSIS, WITH LONG-TERM CURRENT USE OF INSULIN (HCC): ICD-10-CM

## 2024-05-14 DIAGNOSIS — I25.10 CORONARY ARTERY DISEASE INVOLVING NATIVE CORONARY ARTERY OF NATIVE HEART WITHOUT ANGINA PECTORIS: ICD-10-CM

## 2024-05-14 DIAGNOSIS — D64.9 CHRONIC ANEMIA: ICD-10-CM

## 2024-05-14 DIAGNOSIS — Z85.42 HISTORY OF UTERINE CANCER: ICD-10-CM

## 2024-05-14 DIAGNOSIS — I48.91 ATRIAL FIBRILLATION/FLUTTER (HCC): ICD-10-CM

## 2024-05-14 DIAGNOSIS — E78.00 HYPERCHOLESTEROLEMIA: ICD-10-CM

## 2024-05-14 DIAGNOSIS — I50.32 HEART FAILURE WITH RECOVERED EJECTION FRACTION (HFRECEF) (HCC): Primary | ICD-10-CM

## 2024-05-14 DIAGNOSIS — I10 HYPERTENSION, ESSENTIAL: ICD-10-CM

## 2024-05-14 DIAGNOSIS — Z99.2 TYPE 2 DIABETES MELLITUS WITH CHRONIC KIDNEY DISEASE ON CHRONIC DIALYSIS, WITH LONG-TERM CURRENT USE OF INSULIN (HCC): ICD-10-CM

## 2024-05-14 DIAGNOSIS — Z99.2 ESRD ON HEMODIALYSIS (HCC): ICD-10-CM

## 2024-05-14 PROBLEM — R15.9 FECAL INCONTINENCE: Status: RESOLVED | Noted: 2022-03-03 | Resolved: 2024-05-14

## 2024-05-14 PROBLEM — E11.9 DIABETES MELLITUS, TYPE 2 (HCC): Status: ACTIVE | Noted: 2024-05-14

## 2024-05-14 PROBLEM — A04.72 C. DIFFICILE COLITIS: Status: RESOLVED | Noted: 2022-03-03 | Resolved: 2024-05-14

## 2024-05-14 PROBLEM — L98.429 STAGE 4 SKIN ULCER OF SACRAL REGION (HCC): Status: RESOLVED | Noted: 2022-03-03 | Resolved: 2024-05-14

## 2024-05-14 PROCEDURE — 3075F SYST BP GE 130 - 139MM HG: CPT | Performed by: INTERNAL MEDICINE

## 2024-05-14 PROCEDURE — G8419 CALC BMI OUT NRM PARAM NOF/U: HCPCS | Performed by: INTERNAL MEDICINE

## 2024-05-14 PROCEDURE — 1123F ACP DISCUSS/DSCN MKR DOCD: CPT | Performed by: INTERNAL MEDICINE

## 2024-05-14 PROCEDURE — 1090F PRES/ABSN URINE INCON ASSESS: CPT | Performed by: INTERNAL MEDICINE

## 2024-05-14 PROCEDURE — G8400 PT W/DXA NO RESULTS DOC: HCPCS | Performed by: INTERNAL MEDICINE

## 2024-05-14 PROCEDURE — 3078F DIAST BP <80 MM HG: CPT | Performed by: INTERNAL MEDICINE

## 2024-05-14 PROCEDURE — 3046F HEMOGLOBIN A1C LEVEL >9.0%: CPT | Performed by: INTERNAL MEDICINE

## 2024-05-14 PROCEDURE — 2022F DILAT RTA XM EVC RTNOPTHY: CPT | Performed by: INTERNAL MEDICINE

## 2024-05-14 PROCEDURE — 99205 OFFICE O/P NEW HI 60 MIN: CPT | Performed by: INTERNAL MEDICINE

## 2024-05-14 PROCEDURE — 3017F COLORECTAL CA SCREEN DOC REV: CPT | Performed by: INTERNAL MEDICINE

## 2024-05-14 PROCEDURE — 1036F TOBACCO NON-USER: CPT | Performed by: INTERNAL MEDICINE

## 2024-05-14 PROCEDURE — G8428 CUR MEDS NOT DOCUMENT: HCPCS | Performed by: INTERNAL MEDICINE

## 2024-05-14 SDOH — ECONOMIC STABILITY: FOOD INSECURITY: WITHIN THE PAST 12 MONTHS, YOU WORRIED THAT YOUR FOOD WOULD RUN OUT BEFORE YOU GOT MONEY TO BUY MORE.: PATIENT DECLINED

## 2024-05-14 SDOH — ECONOMIC STABILITY: FOOD INSECURITY: WITHIN THE PAST 12 MONTHS, THE FOOD YOU BOUGHT JUST DIDN'T LAST AND YOU DIDN'T HAVE MONEY TO GET MORE.: PATIENT DECLINED

## 2024-05-14 SDOH — ECONOMIC STABILITY: HOUSING INSECURITY
IN THE LAST 12 MONTHS, WAS THERE A TIME WHEN YOU DID NOT HAVE A STEADY PLACE TO SLEEP OR SLEPT IN A SHELTER (INCLUDING NOW)?: PATIENT DECLINED

## 2024-05-14 SDOH — ECONOMIC STABILITY: INCOME INSECURITY: HOW HARD IS IT FOR YOU TO PAY FOR THE VERY BASICS LIKE FOOD, HOUSING, MEDICAL CARE, AND HEATING?: PATIENT DECLINED

## 2024-05-14 ASSESSMENT — ENCOUNTER SYMPTOMS
VOICE CHANGE: 0
EYE PAIN: 0
RECTAL PAIN: 0
STRIDOR: 0

## 2024-05-14 ASSESSMENT — PATIENT HEALTH QUESTIONNAIRE - PHQ9
2. FEELING DOWN, DEPRESSED OR HOPELESS: NOT AT ALL
SUM OF ALL RESPONSES TO PHQ QUESTIONS 1-9: 0
1. LITTLE INTEREST OR PLEASURE IN DOING THINGS: NOT AT ALL
SUM OF ALL RESPONSES TO PHQ9 QUESTIONS 1 & 2: 0

## 2024-05-14 NOTE — PROGRESS NOTES
NEW PATIENT VISIT    Subjective:    Ms. Ross is a 73 y.o., female,   Chief Complaint   Patient presents with    [facility]       HPI:    Ms. Ross is a 73 y.o., female who presents today for a new patient appointment.  Their previous primary provider was [facility].  Old records have been reviewed.      The patient has complex medical problems.  Today's visit was extended with prolonged review of old records.  Patient poor historian.  Confused about her medications.  Current listed med list likely not accurate.  \"I meant to bring all of my medications with me but forgot and left them at home.\"  Most likely not using any short acting Humalog and taking NPH insulin alone only on her non-dialysis days.       The patient has diabetes mellitus.  The patient denies any symptoms of hypo or hyperglycemia.  The patient has been attempting to be compliant with an ADA diet and an exercise program.     The patient has hypertension.  The patient has been on an attempted low sodium diet and has been trying to exercise and maintain a healthy weight.  The patient reports good compliance with the blood pressure medications.       The patient has hyperlipidemia.  The patient has been following a low cholesterol diet and denies any myalgias or weakness on current lipid lowering therapy.       The patient has coronary artery disease.  The patient has been attempting to follow a heart healthy diet and exercise.  The patient denies chest pain, shortness of breath, dyspnea on exertion, or PND.       Other chronic problems outlined below.      The following portions of the patient's history were reviewed and updated as appropriate:      Past Medical History:   Diagnosis Date    Atrial fibrillation/flutter (HCC)     CAD (coronary artery disease)     2018 [facility] / stent distal RCA    Chronic anemia     Diabetes mellitus, type 2 (HCC)     ESRD on hemodialysis ([facility])     M,W,F    Heart failure with recovered ejection

## 2024-06-14 ENCOUNTER — HOSPITAL ENCOUNTER (EMERGENCY)
Age: 74
Discharge: ANOTHER HOSPITAL | End: 2024-06-14
Attending: EMERGENCY MEDICINE
Payer: MEDICARE

## 2024-06-14 VITALS
HEART RATE: 70 BPM | TEMPERATURE: 99 F | BODY MASS INDEX: 29.01 KG/M2 | SYSTOLIC BLOOD PRESSURE: 157 MMHG | WEIGHT: 169 LBS | DIASTOLIC BLOOD PRESSURE: 97 MMHG | OXYGEN SATURATION: 98 % | RESPIRATION RATE: 17 BRPM

## 2024-06-14 DIAGNOSIS — L98.8 FISTULA: ICD-10-CM

## 2024-06-14 DIAGNOSIS — Z99.2 ESRD ON HEMODIALYSIS (HCC): ICD-10-CM

## 2024-06-14 DIAGNOSIS — N18.6 ESRD ON HEMODIALYSIS (HCC): ICD-10-CM

## 2024-06-14 DIAGNOSIS — Z78.9 PROBLEM WITH VASCULAR ACCESS: Primary | ICD-10-CM

## 2024-06-14 LAB
ANION GAP SERPL CALC-SCNC: 18 MMOL/L (ref 9–18)
BUN SERPL-MCNC: 63 MG/DL (ref 8–23)
CALCIUM SERPL-MCNC: 8.7 MG/DL (ref 8.8–10.2)
CHLORIDE SERPL-SCNC: 94 MMOL/L (ref 98–107)
CO2 SERPL-SCNC: 25 MMOL/L (ref 20–28)
CREAT SERPL-MCNC: 9.72 MG/DL (ref 0.6–1.1)
ERYTHROCYTE [DISTWIDTH] IN BLOOD BY AUTOMATED COUNT: 14.2 % (ref 11.9–14.6)
GLUCOSE SERPL-MCNC: 83 MG/DL (ref 70–99)
HCT VFR BLD AUTO: 37.5 % (ref 35.8–46.3)
HGB BLD-MCNC: 12.3 G/DL (ref 11.7–15.4)
MCH RBC QN AUTO: 28 PG (ref 26.1–32.9)
MCHC RBC AUTO-ENTMCNC: 32.8 G/DL (ref 31.4–35)
MCV RBC AUTO: 85.4 FL (ref 82–102)
NRBC # BLD: 0 K/UL (ref 0–0.2)
PLATELET # BLD AUTO: 126 K/UL (ref 150–450)
POTASSIUM SERPL-SCNC: 5.6 MMOL/L (ref 3.5–5.1)
POTASSIUM SERPL-SCNC: 5.9 MMOL/L (ref 3.5–5.1)
RBC # BLD AUTO: 4.39 M/UL (ref 4.05–5.2)
SODIUM SERPL-SCNC: 136 MMOL/L (ref 136–145)
WBC # BLD AUTO: 11.3 K/UL (ref 4.3–11.1)

## 2024-06-14 PROCEDURE — 80048 BASIC METABOLIC PNL TOTAL CA: CPT

## 2024-06-14 PROCEDURE — 99285 EMERGENCY DEPT VISIT HI MDM: CPT

## 2024-06-14 PROCEDURE — 85027 COMPLETE CBC AUTOMATED: CPT

## 2024-06-14 PROCEDURE — 84132 ASSAY OF SERUM POTASSIUM: CPT

## 2024-06-14 ASSESSMENT — PAIN SCALES - GENERAL: PAINLEVEL_OUTOF10: 0

## 2024-06-14 ASSESSMENT — LIFESTYLE VARIABLES
HOW MANY STANDARD DRINKS CONTAINING ALCOHOL DO YOU HAVE ON A TYPICAL DAY: PATIENT DOES NOT DRINK
HOW OFTEN DO YOU HAVE A DRINK CONTAINING ALCOHOL: NEVER

## 2024-06-14 NOTE — ED TRIAGE NOTES
Patient is sent here for her dialysis fistula. Per dialysis nurse it's not working. Patient went to her dialysis today and fistula wasn't working.

## 2024-06-14 NOTE — ED NOTES
TRANSFER - OUT REPORT:    Verbal report given to Nica on Klaudia Cody  being transferred to [facility] for routine progression of patient care       Report consisted of patient's Situation, Background, Assessment and   Recommendations(SBAR).     Information from the following report(s) Nurse Handoff Report was reviewed with the receiving nurse.    Lines:   Peripheral IV 06/14/24 Right Antecubital (Active)   Site Assessment Clean, dry & intact 06/14/24 0849   Line Status Blood return noted 06/14/24 0849   Line Care Cap changed 06/14/24 0849   Phlebitis Assessment No symptoms 06/14/24 0849   Infiltration Assessment 0 06/14/24 0849        Opportunity for questions and clarification was provided.      Patient transported with:  Jas Trevino RN  06/14/24 1294

## 2024-06-14 NOTE — ED PROVIDER NOTES
Emergency Department Provider Note       PCP: Thomas Tiwari MD   Age: 73 y.o.   Sex: female     DISPOSITION Decision To Admit 06/14/2024 08:18:01 AM       ICD-10-CM    1. Problem with vascular access  Z78.9       2. Fistula  L98.8           Medical Decision Making     Patient has dialysis fistula with suspected occlusion.  Consulted vascular surgery and nursing patient in the emergency department.  We are getting blood work and the plan will be for a declot to be done today.      Ju Hernandez MD 9:38 AM EDT 6/14/24 It turns out the patient has a HERO Graft and we cannot do this at our hospital.  We will discuss case with [facility].   Dr. Jiang accepted patient for transfer at preop at [facility]    Ju Hernandez MD 3:47 PM EDT 6/14/24 long delay in care related to [facility] not having a preop spot.  I spoken with Dr. Rendon again and they have been trying to find a spot for her.  In the radiology nursing area so that she can get her procedure done today.  We will arrange transfer by EMS.  [facility] transfer center just told me this and I have notified my .       1 or more acute illnesses that pose a threat to life or bodily function.   Discussion with external consultants.  Shared medical decision making was utilized in creating the patients health plan today.  Considerations: The following items were considered but not ordered: Ultrasound of arm.    I independently ordered and reviewed each unique test.  I reviewed external records: provider visit note from outside specialist.  I reviewed external records: previous lab results from outside ED.                   History     Patient is coming in with concern for dialysis fistula.  She had it placed years ago in the left AC region.  She normally dialyzes Monday, Wednesday, Friday and dialyze normally on Wednesday.  She was unable to get dialysis this morning as there was no flow.  She was seen here by the dialysis center.  She is not having any symptoms.  No fevers

## 2024-06-14 NOTE — CONSULTS
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) ([facility])     History of uterine cancer     S/P MATT-BSO    Hypercholesterolemia     Hypertension, essential      Family History   Problem Relation Age of Onset    Hypertension Mother     Stroke Mother     Heart Disease Father         MI    Diabetes Brother     Heart Disease Brother     Heart Disease Brother     Heart Disease Sister     Hypertension Father      Past Surgical History:   Procedure Laterality Date    COLONOSCOPY      DILATION AND CURETTAGE OF UTERUS      HYSTERECTOMY (CERVIX STATUS UNKNOWN)      IR TUNNELED CATHETER PLACEMENT GREATER THAN 5 YEARS  8/23/2021    right chest    IR TUNNELED CATHETER PLACEMENT GREATER THAN 5 YEARS  8/23/2021    IR TUNNELED CATHETER PLACEMENT GREATER THAN 5 YEARS 8/23/2021 [facility]    VASCULAR SURGERY Left 05/30/2018    AVF creation    VASCULAR SURGERY Right 12/01/2021    Creation of right upper arm brachial axillary loop graft     Social History     Tobacco Use    Smoking status: Never     Passive exposure: Never    Smokeless tobacco: Never   Substance Use Topics    Alcohol use: No        Review of Systems  Constitutional: Negative for fever and chills.   HENT: Negative for congestion and sore throat.    Skin: Negative for rash and itching.  Eyes: Negative for blurred vision and double vision.   Respiratory: Negative for cough and shortness of breath.    Cardiovascular: Negative for chest pain, palpitations, claudication and leg swelling.   Gastrointestinal: Negative for nausea, vomiting and abdominal pain.   Genitourinary: Negative for dysuria, hematuria and flank pain.  Musculoskeletal: Negative for joint pain and falls.  Neurological: Negative for dizziness, sensory change, focal weakness, loss of consciousness and headaches.     PHYSICAL EXAM   [unfilled]     Constitutional:

## 2024-06-17 ENCOUNTER — OFFICE VISIT (OUTPATIENT)
Dept: INTERNAL MEDICINE CLINIC | Facility: CLINIC | Age: 74
End: 2024-06-17
Payer: MEDICARE

## 2024-06-17 VITALS
BODY MASS INDEX: 28.07 KG/M2 | WEIGHT: 164.4 LBS | SYSTOLIC BLOOD PRESSURE: 120 MMHG | HEART RATE: 70 BPM | HEIGHT: 64 IN | DIASTOLIC BLOOD PRESSURE: 66 MMHG

## 2024-06-17 DIAGNOSIS — Z99.2 TYPE 2 DIABETES MELLITUS WITH CHRONIC KIDNEY DISEASE ON CHRONIC DIALYSIS, WITH LONG-TERM CURRENT USE OF INSULIN (HCC): ICD-10-CM

## 2024-06-17 DIAGNOSIS — I50.32 HEART FAILURE WITH RECOVERED EJECTION FRACTION (HFRECEF) (HCC): ICD-10-CM

## 2024-06-17 DIAGNOSIS — Z79.4 TYPE 2 DIABETES MELLITUS WITH CHRONIC KIDNEY DISEASE ON CHRONIC DIALYSIS, WITH LONG-TERM CURRENT USE OF INSULIN (HCC): ICD-10-CM

## 2024-06-17 DIAGNOSIS — I10 HYPERTENSION, ESSENTIAL: ICD-10-CM

## 2024-06-17 DIAGNOSIS — D64.9 CHRONIC ANEMIA: ICD-10-CM

## 2024-06-17 DIAGNOSIS — N18.6 ESRD ON HEMODIALYSIS (HCC): Primary | ICD-10-CM

## 2024-06-17 DIAGNOSIS — E11.22 TYPE 2 DIABETES MELLITUS WITH CHRONIC KIDNEY DISEASE ON CHRONIC DIALYSIS, WITH LONG-TERM CURRENT USE OF INSULIN (HCC): ICD-10-CM

## 2024-06-17 DIAGNOSIS — I48.3 TYPICAL ATRIAL FLUTTER (HCC): ICD-10-CM

## 2024-06-17 DIAGNOSIS — Z79.899 POLYPHARMACY: ICD-10-CM

## 2024-06-17 DIAGNOSIS — N18.6 TYPE 2 DIABETES MELLITUS WITH CHRONIC KIDNEY DISEASE ON CHRONIC DIALYSIS, WITH LONG-TERM CURRENT USE OF INSULIN (HCC): ICD-10-CM

## 2024-06-17 DIAGNOSIS — I25.10 CORONARY ARTERY DISEASE INVOLVING NATIVE CORONARY ARTERY OF NATIVE HEART WITHOUT ANGINA PECTORIS: ICD-10-CM

## 2024-06-17 DIAGNOSIS — E78.00 HYPERCHOLESTEROLEMIA: ICD-10-CM

## 2024-06-17 DIAGNOSIS — Z99.2 ESRD ON HEMODIALYSIS (HCC): Primary | ICD-10-CM

## 2024-06-17 PROCEDURE — 1123F ACP DISCUSS/DSCN MKR DOCD: CPT | Performed by: INTERNAL MEDICINE

## 2024-06-17 PROCEDURE — 3017F COLORECTAL CA SCREEN DOC REV: CPT | Performed by: INTERNAL MEDICINE

## 2024-06-17 PROCEDURE — G8419 CALC BMI OUT NRM PARAM NOF/U: HCPCS | Performed by: INTERNAL MEDICINE

## 2024-06-17 PROCEDURE — 99214 OFFICE O/P EST MOD 30 MIN: CPT | Performed by: INTERNAL MEDICINE

## 2024-06-17 PROCEDURE — G8400 PT W/DXA NO RESULTS DOC: HCPCS | Performed by: INTERNAL MEDICINE

## 2024-06-17 PROCEDURE — 3074F SYST BP LT 130 MM HG: CPT | Performed by: INTERNAL MEDICINE

## 2024-06-17 PROCEDURE — 2022F DILAT RTA XM EVC RTNOPTHY: CPT | Performed by: INTERNAL MEDICINE

## 2024-06-17 PROCEDURE — 3046F HEMOGLOBIN A1C LEVEL >9.0%: CPT | Performed by: INTERNAL MEDICINE

## 2024-06-17 PROCEDURE — 1036F TOBACCO NON-USER: CPT | Performed by: INTERNAL MEDICINE

## 2024-06-17 PROCEDURE — 1090F PRES/ABSN URINE INCON ASSESS: CPT | Performed by: INTERNAL MEDICINE

## 2024-06-17 PROCEDURE — 3078F DIAST BP <80 MM HG: CPT | Performed by: INTERNAL MEDICINE

## 2024-06-17 PROCEDURE — G8428 CUR MEDS NOT DOCUMENT: HCPCS | Performed by: INTERNAL MEDICINE

## 2024-06-17 PROCEDURE — G2211 COMPLEX E/M VISIT ADD ON: HCPCS | Performed by: INTERNAL MEDICINE

## 2024-06-17 RX ORDER — CINACALCET 30 MG/1
30 TABLET, FILM COATED ORAL DAILY
COMMUNITY

## 2024-06-17 RX ORDER — FOLIC ACID/VIT B COMPLEX AND C 0.8 MG
1 TABLET ORAL DAILY
COMMUNITY

## 2024-06-17 ASSESSMENT — ENCOUNTER SYMPTOMS
RECTAL PAIN: 0
EYE PAIN: 0
STRIDOR: 0
VOICE CHANGE: 0

## 2024-06-17 NOTE — PROGRESS NOTES
FOLLOWUP VISIT    Subjective:    Ms. Ross is a 73 y.o., female,   Chief Complaint   Patient presents with    1 Month Follow-Up       HPI:    Patient here for one month follow up.  She brought all of her oral medicines from home in a paper bag for me to clarify and update her medication list.      The patient has hyperlipidemia.  The patient has been attempting to follow a low cholesterol diet and denies any myalgias or weakness on current lipid lowering therapy.       The patient has hypertension.  The patient has been on an attempted low sodium diet and has been trying to exercise and maintain a healthy weight.  The patient reports good compliance with the blood pressure medications.       The patient has diabetes mellitus.  The patient denies any symptoms of hypo or hyperglycemia.  The patient has been attempting to be compliant with an ADA diet and an exercise program.     The following portions of the patient's history were reviewed and updated as appropriate:      Past Medical History:   Diagnosis Date    Atrial fibrillation/flutter ([facility])     CAD (coronary artery disease)     2018 [facility] / stent distal RCA    Chronic anemia     Diabetes mellitus, type 2 ([facility])     ESRD on hemodialysis (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)     History of uterine cancer     S/P MATT-BSO    Hypercholesterolemia     Hypertension, essential        Past Surgical History:   Procedure Laterality Date    COLONOSCOPY      DILATION AND CURETTAGE OF UTERUS      HYSTERECTOMY (CERVIX STATUS UNKNOWN)      IR TUNNELED CATHETER PLACEMENT GREATER THAN 5 YEARS  8/23/2021    right chest    IR TUNNELED CATHETER PLACEMENT GREATER THAN 5 YEARS  8/23/2021    IR TUNNELED CATHETER PLACEMENT GREATER THAN 5 YEARS 8/23/2021 [facility]    VASCULAR SURGERY Left 05/30/2018    AVF creation    VASCULAR SURGERY Right 12/01/2021    Creation of right upper arm brachial axillary loop graft       Family History

## 2024-08-27 ENCOUNTER — TELEPHONE (OUTPATIENT)
Age: 74
End: 2024-08-27

## 2024-08-27 NOTE — TELEPHONE ENCOUNTER
Lvm informing Dr. Marquez's office Dr. Garcia is out of the office assessment will be addressed when she returns.

## 2024-09-03 ENCOUNTER — TELEPHONE (OUTPATIENT)
Dept: INTERNAL MEDICINE CLINIC | Facility: CLINIC | Age: 74
End: 2024-09-03

## 2024-09-03 RX ORDER — APIXABAN 5 MG/1
5 TABLET, FILM COATED ORAL 2 TIMES DAILY
Qty: 60 TABLET | Refills: 0 | Status: SHIPPED | OUTPATIENT
Start: 2024-09-03

## 2024-09-03 NOTE — TELEPHONE ENCOUNTER
Nurse care manager with [facility] called requesting shower chair for this patient.     Tried to reach patient, no answer

## 2024-09-10 ENCOUNTER — OFFICE VISIT (OUTPATIENT)
Dept: INTERNAL MEDICINE CLINIC | Facility: CLINIC | Age: 74
End: 2024-09-10
Payer: MEDICARE

## 2024-09-10 VITALS
WEIGHT: 171 LBS | BODY MASS INDEX: 29.19 KG/M2 | HEART RATE: 57 BPM | SYSTOLIC BLOOD PRESSURE: 118 MMHG | DIASTOLIC BLOOD PRESSURE: 60 MMHG | HEIGHT: 64 IN

## 2024-09-10 DIAGNOSIS — Z74.09 IMPAIRED FUNCTIONAL MOBILITY, BALANCE, AND ENDURANCE: Primary | ICD-10-CM

## 2024-09-10 PROCEDURE — G8419 CALC BMI OUT NRM PARAM NOF/U: HCPCS | Performed by: INTERNAL MEDICINE

## 2024-09-10 PROCEDURE — G8428 CUR MEDS NOT DOCUMENT: HCPCS | Performed by: INTERNAL MEDICINE

## 2024-09-10 PROCEDURE — 3078F DIAST BP <80 MM HG: CPT | Performed by: INTERNAL MEDICINE

## 2024-09-10 PROCEDURE — 3074F SYST BP LT 130 MM HG: CPT | Performed by: INTERNAL MEDICINE

## 2024-09-10 PROCEDURE — 1090F PRES/ABSN URINE INCON ASSESS: CPT | Performed by: INTERNAL MEDICINE

## 2024-09-10 PROCEDURE — 1036F TOBACCO NON-USER: CPT | Performed by: INTERNAL MEDICINE

## 2024-09-10 PROCEDURE — 99213 OFFICE O/P EST LOW 20 MIN: CPT | Performed by: INTERNAL MEDICINE

## 2024-09-10 PROCEDURE — G2211 COMPLEX E/M VISIT ADD ON: HCPCS | Performed by: INTERNAL MEDICINE

## 2024-09-10 PROCEDURE — 1123F ACP DISCUSS/DSCN MKR DOCD: CPT | Performed by: INTERNAL MEDICINE

## 2024-09-10 PROCEDURE — G8400 PT W/DXA NO RESULTS DOC: HCPCS | Performed by: INTERNAL MEDICINE

## 2024-09-10 RX ORDER — SEVELAMER CARBONATE 800 MG/1
1 TABLET, FILM COATED ORAL
COMMUNITY
Start: 2024-09-03

## 2024-09-10 RX ORDER — AMLODIPINE BESYLATE 5 MG/1
5 TABLET ORAL DAILY
COMMUNITY
Start: 2024-08-26

## 2024-09-10 ASSESSMENT — ENCOUNTER SYMPTOMS
STRIDOR: 0
VOICE CHANGE: 0
EYE PAIN: 0
RECTAL PAIN: 0

## 2024-09-19 ENCOUNTER — HOSPITAL ENCOUNTER (EMERGENCY)
Age: 74
Discharge: HOME | End: 2024-09-19
Attending: EMERGENCY MEDICINE
Payer: MEDICARE

## 2024-09-19 VITALS
HEART RATE: 92 BPM | RESPIRATION RATE: 21 BRPM | SYSTOLIC BLOOD PRESSURE: 152 MMHG | OXYGEN SATURATION: 97 % | WEIGHT: 171 LBS | TEMPERATURE: 98.8 F | DIASTOLIC BLOOD PRESSURE: 74 MMHG | HEIGHT: 64 IN | BODY MASS INDEX: 29.19 KG/M2

## 2024-09-19 DIAGNOSIS — I48.92 ATRIAL FLUTTER, CHRONIC (HCC): ICD-10-CM

## 2024-09-19 DIAGNOSIS — Z79.01 CHRONIC ANTICOAGULATION: ICD-10-CM

## 2024-09-19 DIAGNOSIS — T81.89XA DRAINING POSTOPERATIVE WOUND, INITIAL ENCOUNTER: Primary | ICD-10-CM

## 2024-09-19 PROCEDURE — 99283 EMERGENCY DEPT VISIT LOW MDM: CPT

## 2024-09-19 PROCEDURE — 93005 ELECTROCARDIOGRAM TRACING: CPT | Performed by: EMERGENCY MEDICINE

## 2024-09-19 ASSESSMENT — PAIN - FUNCTIONAL ASSESSMENT: PAIN_FUNCTIONAL_ASSESSMENT: NONE - DENIES PAIN

## 2024-09-20 ENCOUNTER — CARE COORDINATION (OUTPATIENT)
Dept: CARE COORDINATION | Facility: CLINIC | Age: 74
End: 2024-09-20

## 2024-09-20 LAB
EKG ATRIAL RATE: 250 BPM
EKG DIAGNOSIS: NORMAL
EKG Q-T INTERVAL: 360 MS
EKG QRS DURATION: 78 MS
EKG QTC CALCULATION (BAZETT): 501 MS
EKG R AXIS: 20 DEGREES
EKG T AXIS: 78 DEGREES
EKG VENTRICULAR RATE: 116 BPM

## 2024-09-20 PROCEDURE — 93010 ELECTROCARDIOGRAM REPORT: CPT | Performed by: INTERNAL MEDICINE

## 2024-09-23 ENCOUNTER — OFFICE VISIT (OUTPATIENT)
Dept: INTERNAL MEDICINE CLINIC | Facility: CLINIC | Age: 74
End: 2024-09-23
Payer: MEDICARE

## 2024-09-23 ENCOUNTER — CARE COORDINATION (OUTPATIENT)
Dept: CARE COORDINATION | Facility: CLINIC | Age: 74
End: 2024-09-23

## 2024-09-23 VITALS
SYSTOLIC BLOOD PRESSURE: 120 MMHG | HEIGHT: 64 IN | WEIGHT: 167.2 LBS | HEART RATE: 100 BPM | BODY MASS INDEX: 28.54 KG/M2 | DIASTOLIC BLOOD PRESSURE: 60 MMHG

## 2024-09-23 DIAGNOSIS — E78.00 HYPERCHOLESTEROLEMIA: ICD-10-CM

## 2024-09-23 DIAGNOSIS — Z00.00 MEDICARE ANNUAL WELLNESS VISIT, SUBSEQUENT: Primary | ICD-10-CM

## 2024-09-23 DIAGNOSIS — N18.6 TYPE 2 DIABETES MELLITUS WITH CHRONIC KIDNEY DISEASE ON CHRONIC DIALYSIS, WITH LONG-TERM CURRENT USE OF INSULIN (HCC): ICD-10-CM

## 2024-09-23 DIAGNOSIS — I50.32 HEART FAILURE WITH RECOVERED EJECTION FRACTION (HFRECEF) (HCC): ICD-10-CM

## 2024-09-23 DIAGNOSIS — E11.22 TYPE 2 DIABETES MELLITUS WITH CHRONIC KIDNEY DISEASE ON CHRONIC DIALYSIS, WITH LONG-TERM CURRENT USE OF INSULIN (HCC): ICD-10-CM

## 2024-09-23 DIAGNOSIS — N18.6 ESRD ON HEMODIALYSIS (HCC): ICD-10-CM

## 2024-09-23 DIAGNOSIS — I48.3 TYPICAL ATRIAL FLUTTER (HCC): ICD-10-CM

## 2024-09-23 DIAGNOSIS — D64.9 CHRONIC ANEMIA: ICD-10-CM

## 2024-09-23 DIAGNOSIS — Z99.2 TYPE 2 DIABETES MELLITUS WITH CHRONIC KIDNEY DISEASE ON CHRONIC DIALYSIS, WITH LONG-TERM CURRENT USE OF INSULIN (HCC): ICD-10-CM

## 2024-09-23 DIAGNOSIS — Z99.2 ESRD ON HEMODIALYSIS (HCC): ICD-10-CM

## 2024-09-23 DIAGNOSIS — I10 HYPERTENSION, ESSENTIAL: ICD-10-CM

## 2024-09-23 DIAGNOSIS — Z79.4 TYPE 2 DIABETES MELLITUS WITH CHRONIC KIDNEY DISEASE ON CHRONIC DIALYSIS, WITH LONG-TERM CURRENT USE OF INSULIN (HCC): ICD-10-CM

## 2024-09-23 PROCEDURE — 1036F TOBACCO NON-USER: CPT | Performed by: INTERNAL MEDICINE

## 2024-09-23 PROCEDURE — 1123F ACP DISCUSS/DSCN MKR DOCD: CPT | Performed by: INTERNAL MEDICINE

## 2024-09-23 PROCEDURE — G8400 PT W/DXA NO RESULTS DOC: HCPCS | Performed by: INTERNAL MEDICINE

## 2024-09-23 PROCEDURE — 3074F SYST BP LT 130 MM HG: CPT | Performed by: INTERNAL MEDICINE

## 2024-09-23 PROCEDURE — 99214 OFFICE O/P EST MOD 30 MIN: CPT | Performed by: INTERNAL MEDICINE

## 2024-09-23 PROCEDURE — 3078F DIAST BP <80 MM HG: CPT | Performed by: INTERNAL MEDICINE

## 2024-09-23 PROCEDURE — G0439 PPPS, SUBSEQ VISIT: HCPCS | Performed by: INTERNAL MEDICINE

## 2024-09-23 PROCEDURE — 1090F PRES/ABSN URINE INCON ASSESS: CPT | Performed by: INTERNAL MEDICINE

## 2024-09-23 PROCEDURE — G8428 CUR MEDS NOT DOCUMENT: HCPCS | Performed by: INTERNAL MEDICINE

## 2024-09-23 PROCEDURE — G8419 CALC BMI OUT NRM PARAM NOF/U: HCPCS | Performed by: INTERNAL MEDICINE

## 2024-09-23 ASSESSMENT — ENCOUNTER SYMPTOMS
RECTAL PAIN: 0
VOICE CHANGE: 0
STRIDOR: 0
EYE PAIN: 0

## 2024-09-23 ASSESSMENT — PATIENT HEALTH QUESTIONNAIRE - PHQ9
SUM OF ALL RESPONSES TO PHQ QUESTIONS 1-9: 0
SUM OF ALL RESPONSES TO PHQ9 QUESTIONS 1 & 2: 0
SUM OF ALL RESPONSES TO PHQ QUESTIONS 1-9: 0
2. FEELING DOWN, DEPRESSED OR HOPELESS: NOT AT ALL
SUM OF ALL RESPONSES TO PHQ QUESTIONS 1-9: 0
1. LITTLE INTEREST OR PLEASURE IN DOING THINGS: NOT AT ALL
SUM OF ALL RESPONSES TO PHQ QUESTIONS 1-9: 0

## 2024-09-24 ENCOUNTER — OFFICE VISIT (OUTPATIENT)
Age: 74
End: 2024-09-24
Payer: MEDICARE

## 2024-09-24 VITALS
HEART RATE: 82 BPM | WEIGHT: 163.4 LBS | SYSTOLIC BLOOD PRESSURE: 118 MMHG | HEIGHT: 64 IN | DIASTOLIC BLOOD PRESSURE: 82 MMHG | BODY MASS INDEX: 27.9 KG/M2

## 2024-09-24 DIAGNOSIS — N18.6 TYPE 2 DIABETES MELLITUS WITH CHRONIC KIDNEY DISEASE ON CHRONIC DIALYSIS, WITH LONG-TERM CURRENT USE OF INSULIN (HCC): ICD-10-CM

## 2024-09-24 DIAGNOSIS — N18.6 ESRD ON HEMODIALYSIS (HCC): ICD-10-CM

## 2024-09-24 DIAGNOSIS — E11.22 TYPE 2 DIABETES MELLITUS WITH CHRONIC KIDNEY DISEASE ON CHRONIC DIALYSIS, WITH LONG-TERM CURRENT USE OF INSULIN (HCC): ICD-10-CM

## 2024-09-24 DIAGNOSIS — I25.10 CORONARY ARTERY DISEASE INVOLVING NATIVE CORONARY ARTERY OF NATIVE HEART WITHOUT ANGINA PECTORIS: ICD-10-CM

## 2024-09-24 DIAGNOSIS — Z79.4 TYPE 2 DIABETES MELLITUS WITH CHRONIC KIDNEY DISEASE ON CHRONIC DIALYSIS, WITH LONG-TERM CURRENT USE OF INSULIN (HCC): ICD-10-CM

## 2024-09-24 DIAGNOSIS — I50.32 HEART FAILURE WITH RECOVERED EJECTION FRACTION (HFRECEF) (HCC): ICD-10-CM

## 2024-09-24 DIAGNOSIS — Z99.2 ESRD ON HEMODIALYSIS (HCC): ICD-10-CM

## 2024-09-24 DIAGNOSIS — I48.3 TYPICAL ATRIAL FLUTTER (HCC): Primary | ICD-10-CM

## 2024-09-24 DIAGNOSIS — Z99.2 TYPE 2 DIABETES MELLITUS WITH CHRONIC KIDNEY DISEASE ON CHRONIC DIALYSIS, WITH LONG-TERM CURRENT USE OF INSULIN (HCC): ICD-10-CM

## 2024-09-24 DIAGNOSIS — I10 HYPERTENSION, ESSENTIAL: ICD-10-CM

## 2024-09-24 PROCEDURE — G8419 CALC BMI OUT NRM PARAM NOF/U: HCPCS | Performed by: INTERNAL MEDICINE

## 2024-09-24 PROCEDURE — G8400 PT W/DXA NO RESULTS DOC: HCPCS | Performed by: INTERNAL MEDICINE

## 2024-09-24 PROCEDURE — 1090F PRES/ABSN URINE INCON ASSESS: CPT | Performed by: INTERNAL MEDICINE

## 2024-09-24 PROCEDURE — 1123F ACP DISCUSS/DSCN MKR DOCD: CPT | Performed by: INTERNAL MEDICINE

## 2024-09-24 PROCEDURE — 99214 OFFICE O/P EST MOD 30 MIN: CPT | Performed by: INTERNAL MEDICINE

## 2024-09-24 PROCEDURE — G8427 DOCREV CUR MEDS BY ELIG CLIN: HCPCS | Performed by: INTERNAL MEDICINE

## 2024-09-24 PROCEDURE — 1036F TOBACCO NON-USER: CPT | Performed by: INTERNAL MEDICINE

## 2024-09-24 PROCEDURE — 3079F DIAST BP 80-89 MM HG: CPT | Performed by: INTERNAL MEDICINE

## 2024-09-24 PROCEDURE — 3074F SYST BP LT 130 MM HG: CPT | Performed by: INTERNAL MEDICINE

## 2024-10-24 RX ORDER — APIXABAN 5 MG/1
5 TABLET, FILM COATED ORAL 2 TIMES DAILY
Qty: 60 TABLET | Refills: 0 | Status: SHIPPED | OUTPATIENT
Start: 2024-10-24

## 2024-11-18 RX ORDER — APIXABAN 5 MG/1
5 TABLET, FILM COATED ORAL 2 TIMES DAILY
Qty: 60 TABLET | Refills: 0 | Status: SHIPPED | OUTPATIENT
Start: 2024-11-18

## 2024-12-18 RX ORDER — APIXABAN 5 MG/1
5 TABLET, FILM COATED ORAL 2 TIMES DAILY
Qty: 60 TABLET | Refills: 0 | Status: SHIPPED | OUTPATIENT
Start: 2024-12-18

## 2025-01-20 RX ORDER — APIXABAN 5 MG/1
5 TABLET, FILM COATED ORAL 2 TIMES DAILY
Qty: 60 TABLET | Refills: 0 | Status: SHIPPED | OUTPATIENT
Start: 2025-01-20

## 2025-02-18 RX ORDER — APIXABAN 5 MG/1
5 TABLET, FILM COATED ORAL 2 TIMES DAILY
Qty: 60 TABLET | Refills: 0 | Status: SHIPPED | OUTPATIENT
Start: 2025-02-18

## 2025-03-06 ENCOUNTER — TELEPHONE (OUTPATIENT)
Age: 75
End: 2025-03-06

## 2025-03-06 NOTE — TELEPHONE ENCOUNTER
Cardiac Clearance        Physician or Practice Requesting:Vascular Access Surgery   : Dr. Marquez  Contact Phone Number: [phone]  Fax Number: [phone]  Date of Surgery/Procedure: 03/18/25  Type of Surgery or Procedure: Hero graft Excision   Type of Anesthesia: Block   Type of Clearance Requested: risk assessment and any medication hold   Medication to Hold:Eliquis   Days to Hold: 2 day hold

## 2025-03-10 ENCOUNTER — TELEPHONE (OUTPATIENT)
Age: 75
End: 2025-03-10

## 2025-03-10 NOTE — TELEPHONE ENCOUNTER
Called and spoke to office only received one page of risk assessment. Resent assessment and advised to call back if it does not come through again so we can do a sushil fax. Voiced understanding.

## 2025-03-10 NOTE — TELEPHONE ENCOUNTER
Office is Calling again because fax didn't state if patient is low or high risk and also not stating about the medication              Cardiac Clearance           Physician or Practice Requesting:Vascular Access Surgery   : Dr. Marquez  Contact Phone Number: [phone]  Fax Number: [phone]  Date of Surgery/Procedure: 03/18/25  Type of Surgery or Procedure: Hero graft Excision   Type of Anesthesia: Block   Type of Clearance Requested: risk assessment and any medication hold   Medication to Hold:Eliquis   Days to Hold: 2 day hold

## 2025-03-24 RX ORDER — APIXABAN 5 MG/1
5 TABLET, FILM COATED ORAL 2 TIMES DAILY
Qty: 60 TABLET | Refills: 0 | Status: SHIPPED | OUTPATIENT
Start: 2025-03-24 | End: 2025-03-25 | Stop reason: SDUPTHER

## 2025-03-24 NOTE — PROGRESS NOTES
[facility] CARDIOLOGY  [address] [place] [address], [address]  [place] [address]  PHONE: [phone]      25    NAME:  Klaudia Ross  : 1950  MRN: [id number]         SUBJECTIVE:   Klaudia Ross is a 74 y.o. female seen for a follow up visit regarding the following:     Chief Complaint   Patient presents with    Follow-up    Coronary Artery Disease    Hypertension            HPI:  Follow up  Follow-up, Coronary Artery Disease, and Hypertension   .    Follow up asymptomatic Mobitz II block likely due to clonidine and metoprolol , now off clonidine.  Afib. 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     Hasn't had follow up labs since last year, or at least not in a place that is accessible to me, most of her labs followed by nephrology, need to get copies to her chart    She's been having about 10 minutes of light headedness after she takes her morning meds if she bends over, feels off balance.  No chest pain or dyspnea, head just feels funny.  Doesn't have a bp cuff at home. Her BP has been dropping during dialysis.  She recalls the nurse instructed her to stop the low dose metoprolol, just end of last week.  She hasn't taken it today and I had planned to stop it anyway today.         PAST CARDIAC HISTORY:  Aug 2018       [facility] stent to [place] (Dr Wong-[facility]), preserved EF (prior hfref, recovered)  2024       EF 60-65%, abn DF, normal thickness. AV sclerosis, mild MR, mild LAE       New onset atrial fib/flutter       Monitored for 12 hours.  Sustained typical atrial flutter with 4:1 conduction. No diary entries. Rate controlled              Cardiac Medications       ACE

## 2025-03-25 ENCOUNTER — OFFICE VISIT (OUTPATIENT)
Age: 75
End: 2025-03-25
Payer: MEDICARE

## 2025-03-25 VITALS
HEART RATE: 96 BPM | DIASTOLIC BLOOD PRESSURE: 60 MMHG | HEIGHT: 64 IN | WEIGHT: 167 LBS | BODY MASS INDEX: 28.51 KG/M2 | SYSTOLIC BLOOD PRESSURE: 122 MMHG

## 2025-03-25 DIAGNOSIS — E11.22 TYPE 2 DIABETES MELLITUS WITH CHRONIC KIDNEY DISEASE ON CHRONIC DIALYSIS, WITH LONG-TERM CURRENT USE OF INSULIN (HCC): ICD-10-CM

## 2025-03-25 DIAGNOSIS — Z99.2 ESRD ON HEMODIALYSIS (HCC): ICD-10-CM

## 2025-03-25 DIAGNOSIS — I50.32 HEART FAILURE WITH RECOVERED EJECTION FRACTION (HFRECEF) (HCC): ICD-10-CM

## 2025-03-25 DIAGNOSIS — I25.10 CORONARY ARTERY DISEASE INVOLVING NATIVE CORONARY ARTERY OF NATIVE HEART WITHOUT ANGINA PECTORIS: Primary | ICD-10-CM

## 2025-03-25 DIAGNOSIS — Z79.4 TYPE 2 DIABETES MELLITUS WITH CHRONIC KIDNEY DISEASE ON CHRONIC DIALYSIS, WITH LONG-TERM CURRENT USE OF INSULIN (HCC): ICD-10-CM

## 2025-03-25 DIAGNOSIS — I10 HYPERTENSION, ESSENTIAL: ICD-10-CM

## 2025-03-25 DIAGNOSIS — N18.6 ESRD ON HEMODIALYSIS (HCC): ICD-10-CM

## 2025-03-25 DIAGNOSIS — I48.3 TYPICAL ATRIAL FLUTTER (HCC): ICD-10-CM

## 2025-03-25 DIAGNOSIS — N18.6 TYPE 2 DIABETES MELLITUS WITH CHRONIC KIDNEY DISEASE ON CHRONIC DIALYSIS, WITH LONG-TERM CURRENT USE OF INSULIN (HCC): ICD-10-CM

## 2025-03-25 DIAGNOSIS — Z99.2 TYPE 2 DIABETES MELLITUS WITH CHRONIC KIDNEY DISEASE ON CHRONIC DIALYSIS, WITH LONG-TERM CURRENT USE OF INSULIN (HCC): ICD-10-CM

## 2025-03-25 PROCEDURE — G8400 PT W/DXA NO RESULTS DOC: HCPCS | Performed by: INTERNAL MEDICINE

## 2025-03-25 PROCEDURE — 1123F ACP DISCUSS/DSCN MKR DOCD: CPT | Performed by: INTERNAL MEDICINE

## 2025-03-25 PROCEDURE — 3074F SYST BP LT 130 MM HG: CPT | Performed by: INTERNAL MEDICINE

## 2025-03-25 PROCEDURE — 99214 OFFICE O/P EST MOD 30 MIN: CPT | Performed by: INTERNAL MEDICINE

## 2025-03-25 PROCEDURE — 1160F RVW MEDS BY RX/DR IN RCRD: CPT | Performed by: INTERNAL MEDICINE

## 2025-03-25 PROCEDURE — G8427 DOCREV CUR MEDS BY ELIG CLIN: HCPCS | Performed by: INTERNAL MEDICINE

## 2025-03-25 PROCEDURE — 3078F DIAST BP <80 MM HG: CPT | Performed by: INTERNAL MEDICINE

## 2025-03-25 PROCEDURE — 1159F MED LIST DOCD IN RCRD: CPT | Performed by: INTERNAL MEDICINE

## 2025-03-25 PROCEDURE — 1036F TOBACCO NON-USER: CPT | Performed by: INTERNAL MEDICINE

## 2025-03-25 PROCEDURE — 1126F AMNT PAIN NOTED NONE PRSNT: CPT | Performed by: INTERNAL MEDICINE

## 2025-03-25 PROCEDURE — G8419 CALC BMI OUT NRM PARAM NOF/U: HCPCS | Performed by: INTERNAL MEDICINE

## 2025-03-25 PROCEDURE — 1090F PRES/ABSN URINE INCON ASSESS: CPT | Performed by: INTERNAL MEDICINE

## 2025-03-25 RX ORDER — HYDRALAZINE HYDROCHLORIDE 25 MG/1
25 TABLET, FILM COATED ORAL 3 TIMES DAILY
Qty: 270 TABLET | Refills: 3 | Status: SHIPPED | OUTPATIENT
Start: 2025-03-25

## 2025-03-25 RX ORDER — MAGNESIUM GLUCONATE 27 MG(500)
500 TABLET ORAL EVERY OTHER DAY
COMMUNITY

## 2025-03-25 ASSESSMENT — ENCOUNTER SYMPTOMS: SHORTNESS OF BREATH: 0

## 2025-03-26 ENCOUNTER — TELEPHONE (OUTPATIENT)
Age: 75
End: 2025-03-26

## 2025-03-26 NOTE — TELEPHONE ENCOUNTER
Pt is scheduled to have surgery 03/27/25 at 7:30am. Angelita from the surgeons office needs to know if the pt is allowed to have the scheduled graft excision since we placed a monitor on 3/25/25. It will be done under MAC anesthesia. Angelita's contact is [phone]. [facility] [place] Dr. Manny Marquez

## 2025-04-07 ENCOUNTER — RESULTS FOLLOW-UP (OUTPATIENT)
Age: 75
End: 2025-04-07

## 2025-04-07 NOTE — TELEPHONE ENCOUNTER
----- Message from Dr. Saniya Pope MD sent at 4/7/2025  2:09 PM EDT -----  Monitor shows atrial flutter about 1/3 of the time.  Generally rate controlled.  Didn't tolerate metoprolol per notes, recently stopped.  Would stay the course for now.

## 2025-04-15 NOTE — PROGRESS NOTES
[facility] CARDIOLOGY  [address] [place] [address], [address]  [place] [address]  PHONE: [phone]      25    NAME:  Klaudia Ross  : 1950  MRN: [id number]         SUBJECTIVE:   Klaudia Ross is a 74 y.o. female seen for a follow up visit regarding the following:     Chief Complaint   Patient presents with    Results     monitor    Atrial Fibrillation    Hypertension    Hyperlipidemia            HPI:  Follow up  Results (monitor), Atrial Fibrillation, Hypertension, and Hyperlipidemia   .    Follow up asymptomatic Mobitz II block likely due to clonidine and metoprolol , now off clonidine, metoprolol halved d/t hypotension on dialysis.  Afib. 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     Monitor detailed below shows ~30% burden of rate controlled atypical atrial flutter.  No pauses or significant block    Her dialysis has been going ok, she got a new fistula on the right.  She's had no further problems with  hypotension on dialysis, though she says she did faint on Saturday.  She took all of her BP meds at one time, felt swimmy headed and fell back.  She's been adjusting the timing of her meds.  She takes her hydralazine when she goes to dialysis, takes the lisinopril at night, and the Eliquis other time. She's spreading them out.            PAST CARDIAC HISTORY:  Aug 2018       [facility] stent to dRCA (Dr Wong-[facility]), preserved EF (prior hfref, recovered)  2024       EF 60-65%, abn DF, normal thickness. AV sclerosis, mild MR, mild LAE       New onset atrial fib/flutter       Monitored for 12 hours.  Sustained typical atrial flutter with 4:1 conduction. No diary entries. Rate  [facility]

## 2025-04-16 ENCOUNTER — OFFICE VISIT (OUTPATIENT)
Age: 75
End: 2025-04-16
Payer: MEDICARE

## 2025-04-16 VITALS
DIASTOLIC BLOOD PRESSURE: 84 MMHG | WEIGHT: 163 LBS | BODY MASS INDEX: 27.83 KG/M2 | SYSTOLIC BLOOD PRESSURE: 126 MMHG | HEART RATE: 68 BPM | HEIGHT: 64 IN

## 2025-04-16 DIAGNOSIS — Z99.2 ESRD ON HEMODIALYSIS (HCC): ICD-10-CM

## 2025-04-16 DIAGNOSIS — N18.6 ESRD ON HEMODIALYSIS (HCC): ICD-10-CM

## 2025-04-16 DIAGNOSIS — I10 HYPERTENSION, ESSENTIAL: ICD-10-CM

## 2025-04-16 DIAGNOSIS — I50.32 HEART FAILURE WITH RECOVERED EJECTION FRACTION (HFRECEF) (HCC): ICD-10-CM

## 2025-04-16 DIAGNOSIS — I48.92 ATRIAL FIBRILLATION/FLUTTER (HCC): Primary | ICD-10-CM

## 2025-04-16 DIAGNOSIS — I48.91 ATRIAL FIBRILLATION/FLUTTER (HCC): Primary | ICD-10-CM

## 2025-04-16 DIAGNOSIS — I25.10 CORONARY ARTERY DISEASE INVOLVING NATIVE CORONARY ARTERY OF NATIVE HEART WITHOUT ANGINA PECTORIS: ICD-10-CM

## 2025-04-16 PROCEDURE — 1159F MED LIST DOCD IN RCRD: CPT | Performed by: INTERNAL MEDICINE

## 2025-04-16 PROCEDURE — G8419 CALC BMI OUT NRM PARAM NOF/U: HCPCS | Performed by: INTERNAL MEDICINE

## 2025-04-16 PROCEDURE — 1160F RVW MEDS BY RX/DR IN RCRD: CPT | Performed by: INTERNAL MEDICINE

## 2025-04-16 PROCEDURE — 1126F AMNT PAIN NOTED NONE PRSNT: CPT | Performed by: INTERNAL MEDICINE

## 2025-04-16 PROCEDURE — 99214 OFFICE O/P EST MOD 30 MIN: CPT | Performed by: INTERNAL MEDICINE

## 2025-04-16 PROCEDURE — 3079F DIAST BP 80-89 MM HG: CPT | Performed by: INTERNAL MEDICINE

## 2025-04-16 PROCEDURE — 1036F TOBACCO NON-USER: CPT | Performed by: INTERNAL MEDICINE

## 2025-04-16 PROCEDURE — 1123F ACP DISCUSS/DSCN MKR DOCD: CPT | Performed by: INTERNAL MEDICINE

## 2025-04-16 PROCEDURE — 1090F PRES/ABSN URINE INCON ASSESS: CPT | Performed by: INTERNAL MEDICINE

## 2025-04-16 PROCEDURE — G8427 DOCREV CUR MEDS BY ELIG CLIN: HCPCS | Performed by: INTERNAL MEDICINE

## 2025-04-16 PROCEDURE — G8400 PT W/DXA NO RESULTS DOC: HCPCS | Performed by: INTERNAL MEDICINE

## 2025-04-16 PROCEDURE — 3074F SYST BP LT 130 MM HG: CPT | Performed by: INTERNAL MEDICINE

## 2025-04-16 RX ORDER — HYDROXYZINE HYDROCHLORIDE 10 MG/1
10 TABLET, FILM COATED ORAL EVERY 8 HOURS PRN
COMMUNITY
Start: 2025-03-10

## 2025-04-16 ASSESSMENT — ENCOUNTER SYMPTOMS: SHORTNESS OF BREATH: 0

## (undated) DEVICE — UNIVERSAL DRAPES: Brand: MEDLINE INDUSTRIES, INC.

## (undated) DEVICE — APPLICATOR BNDG 1MM ADH PREMIERPRO EXOFIN

## (undated) DEVICE — RADIFOCUS GLIDEWIRE: Brand: GLIDEWIRE

## (undated) DEVICE — INTENDED TO BE USED TO OCCLUDE, RETRACT AND IDENTIFY ARTERIES, VEINS, TENDONS AND NERVES IN SURGICAL PROCEDURES: Brand: STERION®  VESSEL LOOP

## (undated) DEVICE — SUTURE VCRL SZ 3-0 L27IN ABSRB UD L26MM SH 1/2 CIR J416H

## (undated) DEVICE — SUTURE NONABSORBABLE MONOFILAMENT 5-0 C-1 1X24 IN PROLENE 8725H

## (undated) DEVICE — SUTURE PERMAHAND SZ 3-0 L18IN NONABSORBABLE BLK SILK BRAID A184H

## (undated) DEVICE — SYR 10ML LUER LOK 1/5ML GRAD --

## (undated) DEVICE — CARDINAL HEALTH FLEXIBLE LIGHT HANDLE COVER: Brand: CARDINAL HEALTH

## (undated) DEVICE — GOWN,REINF,POLY,ECL,PP SLV,XL: Brand: MEDLINE

## (undated) DEVICE — VASCUCLAMP RADIOPAQUE VASCULAR CLAMP SMALL STRAIGHT: Brand: VASCUCLAMP

## (undated) DEVICE — INTENDED FOR TISSUE SEPARATION, AND OTHER PROCEDURES THAT REQUIRE A SHARP SURGICAL BLADE TO PUNCTURE OR CUT.: Brand: BARD-PARKER SAFETY BLADES SIZE 11, STERILE

## (undated) DEVICE — GOWN,PREVENTION PLUS,2XL,ST,22/CS: Brand: MEDLINE

## (undated) DEVICE — AGENT HEMSTAT W4XL4IN OXIDIZED REGENERATED CELOS ABSRB SFT

## (undated) DEVICE — SUTURE PERMAHAND SZ 2-0 L12X18IN NONABSORBABLE BLK SILK A185H

## (undated) DEVICE — WIPE INSTR HIGH ABSORBENT FAST WICKING LINT FREE COUNT 20

## (undated) DEVICE — FOGARTY ARTERIAL EMBOLECTOMY CATHETER 3F 80CM: Brand: FOGARTY

## (undated) DEVICE — SYRINGE IRRIG 60ML SFT PLIABLE BLB EZ TO GRP 1 HND USE W/

## (undated) DEVICE — FOGARTY ARTERIAL EMBOLECTOMY CATHETER 4F 80CM: Brand: FOGARTY

## (undated) DEVICE — BLADE ASSEMB CLP HAIR FINE --

## (undated) DEVICE — DISH PTRI STRL --

## (undated) DEVICE — DRAPE SHT 3 QTR PROXIMA 53X77 --

## (undated) DEVICE — 1840 FOAM BLOCK NEEDLE COUNTER: Brand: DEVON

## (undated) DEVICE — REM POLYHESIVE ADULT PATIENT RETURN ELECTRODE: Brand: VALLEYLAB

## (undated) DEVICE — Device

## (undated) DEVICE — GEL US 20GM NONIRRITATING OVERWRAPPED FILE PCH TRNSMIT

## (undated) DEVICE — NEEDLE HYPO 25GA L1.5IN BLU POLYPR HUB S STL REG BVL STR

## (undated) DEVICE — SYRINGE ANGIO CNTRST DEL 20 CC POLYCARB DK GRN MEDALLION

## (undated) DEVICE — PREP SKN CHLRAPRP APL 26ML STR --

## (undated) DEVICE — KENDALL SCD EXPRESS SLEEVES, KNEE LENGTH, MEDIUM: Brand: KENDALL SCD

## (undated) DEVICE — SUTURE PROL SZ 6-0 L24IN NONABSORBABLE BLU BV-1 L9.3MM 3/8 M8805

## (undated) DEVICE — APPLIER CLP L9.375IN APER 2.1MM CLS L3.8MM 20 SM TI CLP

## (undated) DEVICE — SYRINGE MED 20ML WHT PLUNG CLR POLYCARB BRL FIX M LUER CONN

## (undated) DEVICE — INTENDED FOR TISSUE SEPARATION, AND OTHER PROCEDURES THAT REQUIRE A SHARP SURGICAL BLADE TO PUNCTURE OR CUT.: Brand: BARD-PARKER ® STAINLESS STEEL BLADES

## (undated) DEVICE — SYR LR LCK 1ML GRAD NSAF 30ML --

## (undated) DEVICE — SUTURE MCRYL SZ 4-0 L27IN ABSRB UD L19MM PS-2 1/2 CIR PRIM Y426H

## (undated) DEVICE — COVER,LIGHT HANDLE,FLX,2/PK: Brand: MEDLINE INDUSTRIES, INC.

## (undated) DEVICE — (D)PREP SKN CHLRAPRP APPL 26ML -- CONVERT TO ITEM 371833

## (undated) DEVICE — APPLIER RMFG CLP LIG SM 9IN --

## (undated) DEVICE — SKIN MARKER,REGULAR TIP WITH RULER AND LABELS: Brand: DEVON

## (undated) DEVICE — SPONGE LAP 18X18IN STRL -- 5/PK

## (undated) DEVICE — COVER,MAYO STAND,STERILE: Brand: MEDLINE

## (undated) DEVICE — BUTTON SWITCH PENCIL BLADE ELECTRODE, HOLSTER: Brand: EDGE

## (undated) DEVICE — SUT PROL 6-0 24IN BV1 DA BLU --

## (undated) DEVICE — INTENDED USED TO PROTECT, TAG AND HELP LOCATED SUTURES DURING SURGERY: Brand: STERION®SUTURE AID BOOTIES

## (undated) DEVICE — CLOTH PRE OP W9XL10.5IN 2% CHG FRAGRANCE RNS FREE READYPREP

## (undated) DEVICE — BLADE SCALP SURG BARD-PARK 11 --

## (undated) DEVICE — GOWN,REINFORCED,POLY,AURORA,XXLARGE,STR: Brand: MEDLINE

## (undated) DEVICE — DECANTER BAG 9": Brand: MEDLINE INDUSTRIES, INC.

## (undated) DEVICE — INTENDED FOR TISSUE SEPARATION, AND OTHER PROCEDURES THAT REQUIRE A SHARP SURGICAL BLADE TO PUNCTURE OR CUT.: Brand: BARD-PARKER SAFETY BLADES SIZE 15, STERILE

## (undated) DEVICE — AMD ANTIMICROBIAL NON-ADHERENT PAD,0.2% POLYHEXAMETHYLENE BIGUANIDE HCI (PHMB): Brand: TELFA

## (undated) DEVICE — 3M™ TEGADERM™ TRANSPARENT FILM DRESSING FRAME STYLE, 1627, 4 IN X 10 IN (10 CM X 25 CM), 20/CT 4CT/CASE: Brand: 3M™ TEGADERM™

## (undated) DEVICE — TUBING, SUCTION, 1/4" X 10', STRAIGHT: Brand: MEDLINE

## (undated) DEVICE — 2000CC GUARDIAN II: Brand: GUARDIAN

## (undated) DEVICE — RADIFOCUS TORQUE DEVICE MULTI-TORQUE VISE: Brand: RADIFOCUS TORQUE DEVICE

## (undated) DEVICE — SUTURE PERMAHAND SZ 2-0 L18IN NONABSORBABLE BLK L26MM SH C012D

## (undated) DEVICE — AV FISTULA: Brand: MEDLINE INDUSTRIES, INC.

## (undated) DEVICE — ADHESIVE SKIN CLSR 1ML TISS HI VISC EXOFIN

## (undated) DEVICE — GARMENT,MEDLINE,DVT,INT,CALF,MED, GEN2: Brand: MEDLINE

## (undated) DEVICE — SYRINGE MED 1ML POLYCARB LUERLOCK HUB

## (undated) DEVICE — THROMBECTOMY-EMBOLECTOMY: Brand: MEDLINE INDUSTRIES, INC.

## (undated) DEVICE — DRAPE TWL SURG 16X26IN BLU ORB04] ALLCARE INC]

## (undated) DEVICE — ABSORBENT, WATERPROOF, BACTERIA PROOF FILM DRESSING: Brand: OPSITE POST OP 9.5X8.5CM CTN 20

## (undated) DEVICE — DRAPE,U/SHT,SPLIT,FILM,60X84,STERILE: Brand: MEDLINE

## (undated) DEVICE — CATHETER VASC AD 5FR L65CM 0.038 DIAG KA 2 PERIPH W/O

## (undated) DEVICE — INTRODUCER SHTH 8FR CANN L5.5CM DIL TIP 35MM BLU TUNGSTEN

## (undated) DEVICE — AMD ANTIMICROBIAL BANDAGE ROLL,6 PLY: Brand: KERLIX

## (undated) DEVICE — BASIC SINGLE BASIN-LF: Brand: MEDLINE INDUSTRIES, INC.

## (undated) DEVICE — SUTURE PROL SZ 5-0 L24IN NONABSORBABLE BLU L13MM C-1 3/8 M8725

## (undated) DEVICE — C-ARM: Brand: UNBRANDED

## (undated) DEVICE — GLOVE SURG SZ 7.5 L11.73IN FNGR THK9.8MIL STRW LTX POLYMER

## (undated) DEVICE — TELFA NON-ADHERENT ABSORBENT DRESSING: Brand: TELFA

## (undated) DEVICE — SYR 1ML TB 1/100ML GRAD --